# Patient Record
Sex: FEMALE | Race: WHITE | NOT HISPANIC OR LATINO | Employment: FULL TIME | URBAN - NONMETROPOLITAN AREA
[De-identification: names, ages, dates, MRNs, and addresses within clinical notes are randomized per-mention and may not be internally consistent; named-entity substitution may affect disease eponyms.]

---

## 2021-01-12 ENCOUNTER — OFFICE VISIT (OUTPATIENT)
Dept: URGENT CARE | Facility: CLINIC | Age: 36
End: 2021-01-12
Payer: COMMERCIAL

## 2021-01-12 VITALS
DIASTOLIC BLOOD PRESSURE: 82 MMHG | SYSTOLIC BLOOD PRESSURE: 132 MMHG | OXYGEN SATURATION: 94 % | RESPIRATION RATE: 14 BRPM | HEIGHT: 65 IN | TEMPERATURE: 98.5 F | WEIGHT: 237 LBS | BODY MASS INDEX: 39.49 KG/M2 | HEART RATE: 67 BPM

## 2021-01-12 DIAGNOSIS — M79.644 FINGER PAIN, RIGHT: ICD-10-CM

## 2021-01-12 PROCEDURE — 99203 OFFICE O/P NEW LOW 30 MIN: CPT | Performed by: PHYSICIAN ASSISTANT

## 2021-01-12 RX ORDER — DEXAMETHASONE SODIUM PHOSPHATE 4 MG/ML
4 INJECTION, SOLUTION INTRA-ARTICULAR; INTRALESIONAL; INTRAMUSCULAR; INTRAVENOUS; SOFT TISSUE ONCE
Status: DISCONTINUED | OUTPATIENT
Start: 2021-01-12 | End: 2021-01-12

## 2021-01-12 RX ORDER — BUPROPION HYDROCHLORIDE 300 MG/1
TABLET ORAL
COMMUNITY
Start: 2021-01-05 | End: 2021-08-25

## 2021-01-12 RX ORDER — DEXAMETHASONE SODIUM PHOSPHATE 10 MG/ML
4 INJECTION INTRAMUSCULAR; INTRAVENOUS ONCE
OUTPATIENT
Start: 2021-01-12 | End: 2021-01-13

## 2021-01-12 SDOH — HEALTH STABILITY: MENTAL HEALTH: HOW OFTEN DO YOU HAVE A DRINK CONTAINING ALCOHOL?: NEVER

## 2021-01-12 ASSESSMENT — ENCOUNTER SYMPTOMS
SENSORY CHANGE: 0
FOCAL WEAKNESS: 0
GASTROINTESTINAL NEGATIVE: 1
RESPIRATORY NEGATIVE: 1
EYES NEGATIVE: 1
TINGLING: 0
CARDIOVASCULAR NEGATIVE: 1
CONSTITUTIONAL NEGATIVE: 1

## 2021-01-12 NOTE — LETTER
January 12, 2021         Patient: Katherine Salazar   YOB: 1985   Date of Visit: 1/12/2021           To Whom it May Concern:    Katherine Salazar was seen in my clinic on 1/12/2021. Please excuse from work today and tomorrow.     If you have any questions or concerns, please don't hesitate to call.        Sincerely,           Daljit Carrion P.A.-C.  Electronically Signed

## 2021-01-12 NOTE — PROGRESS NOTES
Subjective:   Katherine Salazar is a 35 y.o. female who presents for Hand Pain (right hand)      HPI  Patient is a 35-year-old female who reports of right finger pain and mild swelling onset 01/08/2021.  She states she recently received her Covid vaccination on 01/08/2021 to her left deltoid.  Later on that day, she noticed mild pain and swelling to her right finger with mild minimal swelling to her hand.  There is no redness.  She denies any other side effects possibly from the vaccine other than some left shoulder tenderness and minimal swelling.  She has tried ibuprofen and ice application with minimal relief.  She denies any fever, chills, rash, itching, numbness, tingling, weakness, chest pain, shortness of breath, swelling of lips, swelling of tongue, globus sensation of throat, or any other concerns.  Denies history of gout.  Denies a history of rheumatoid arthritis.  She is unsure of any injury, however she admits she does sleep walk and occasionally she injures herself from sleepwalking such as falls.     Review of Systems   Constitutional: Negative.    HENT: Negative.    Eyes: Negative.    Respiratory: Negative.    Cardiovascular: Negative.    Gastrointestinal: Negative.    Musculoskeletal:        Right finger pain and swelling  Left deltoid tenderness and minimal swelling s/p Covid vaccination injection site.   Skin: Negative.  Negative for itching and rash.   Neurological: Negative for tingling, sensory change and focal weakness.       Medications:    • buPROPion    Allergies: Patient has no known allergies.    Problem List: Katherine Salazar does not have a problem list on file.    Surgical History:  No past surgical history on file.    Past Social Hx: Katherine Salazar  reports that she has never smoked. She has never used smokeless tobacco. She reports that she does not drink alcohol or use drugs.     Past Family Hx:  Katherine Salazar family history is not on file.     Problem list, medications,  "and allergies reviewed by myself today in Epic.     Objective:     /82 (BP Location: Left arm, Patient Position: Sitting)   Pulse 67   Temp 36.9 °C (98.5 °F)   Resp 14   Ht 1.651 m (5' 5\")   Wt 107.5 kg (237 lb)   SpO2 94%   BMI 39.44 kg/m²     Physical Exam  Vitals signs reviewed.   Constitutional:       General: She is not in acute distress.     Appearance: Normal appearance. She is not ill-appearing or toxic-appearing.   HENT:      Head: Normocephalic.      Right Ear: External ear normal.      Left Ear: External ear normal.      Nose: Nose normal.      Mouth/Throat:      Mouth: Mucous membranes are moist. No angioedema.      Dentition: No gum lesions.      Tongue: No lesions.      Palate: No lesions.      Pharynx: Uvula midline. No pharyngeal swelling, oropharyngeal exudate, posterior oropharyngeal erythema or uvula swelling.      Tonsils: No tonsillar exudate or tonsillar abscesses.   Eyes:      Conjunctiva/sclera: Conjunctivae normal.      Pupils: Pupils are equal, round, and reactive to light.   Neck:      Musculoskeletal: Neck supple.   Cardiovascular:      Rate and Rhythm: Normal rate and regular rhythm.      Heart sounds: Normal heart sounds.   Pulmonary:      Effort: Pulmonary effort is normal. No respiratory distress.      Breath sounds: Normal breath sounds. No wheezing, rhonchi or rales.   Musculoskeletal:        Hands:       Comments: Left deltoid: Normal appearance without any erythema, edema, crepitus, deformity, rash.   Positive tenderness to palpation s/p Covid vaccination injection    Right hand: Full range of motion.   Strength 5/5 and equal    Minimal edema to proximal index finger with associated very minimal faint ecchymosis.   Mild tenderness to palpation to proximal index finger and PIP joint.  Negative erythema, crepitus, deformity.  Sensation intact  Brisk capillary refill  Pulses intact   Lymphadenopathy:      Cervical: No cervical adenopathy.   Skin:     General: Skin is " warm and dry.   Neurological:      General: No focal deficit present.      Mental Status: She is alert and oriented to person, place, and time.   Psychiatric:         Mood and Affect: Mood normal.         Behavior: Behavior normal.         Assessment/Associated Orders     1. Finger pain, right  dexamethasone (DECADRON) injection (check route below) 4 mg    DISCONTINUED: dexamethasone (DECADRON) injection 4 mg       Medical Decision Making      Discussed with patient to my knowledge, her right finger pain is unrelated to her COVID-19 vaccination.  Patient signs and symptoms and history are not consistent with an allergic reaction.  Discussed her signs and symptoms for right finger pain and minimal swelling appear to be a musculoskeletal injury possibly she obtained from sleepwalking.  She states she occasionally gets injury from sleepwalking. Discussed initially obtain x-ray, however no x-ray here in the urgent care.  Finger splinted.  Recommended rest, ice application, elevation, start ibuprofen per  instructions tomorrow.  1 dose of dexamethasone oral here in the clinic to help with possible inflammation and swelling.   Follow-up with PCP or return to the urgent care if no improvement in the next several days or any worsening or any other new signs of a rash, worsening swelling, or any other concerns.  Patient requesting a Work note. Note provided.     My total time spent caring for the patient on the day of the encounter was 30 minutes.     Discussed management options, risks and benefits, and alternatives to treatment plan agreed upon.   Red flags discussed and indications to immediately call 911 or present to the Emergency Department.   Supportive care, differential diagnoses, and indications for immediate follow-up discussed with patient.    Patient expresses understanding and agrees to plan. Patient denies any other questions or concerns.     Advised the patient to follow-up with the primary care  physician for recheck, reevaluation, and consideration of further management.    Please note that this dictation was created using voice recognition software. I have made a reasonable attempt to correct obvious errors, but I expect that there are errors of grammar and possibly content that I did not discover before finalizing the note.    This note was electronically signed by Daljit Carrion PA-C

## 2021-05-30 ENCOUNTER — HOSPITAL ENCOUNTER (EMERGENCY)
Facility: MEDICAL CENTER | Age: 36
End: 2021-05-30
Attending: EMERGENCY MEDICINE
Payer: OTHER GOVERNMENT

## 2021-05-30 ENCOUNTER — APPOINTMENT (OUTPATIENT)
Dept: RADIOLOGY | Facility: MEDICAL CENTER | Age: 36
End: 2021-05-30
Attending: EMERGENCY MEDICINE
Payer: OTHER GOVERNMENT

## 2021-05-30 VITALS
HEIGHT: 65 IN | OXYGEN SATURATION: 100 % | HEART RATE: 80 BPM | SYSTOLIC BLOOD PRESSURE: 131 MMHG | DIASTOLIC BLOOD PRESSURE: 74 MMHG | BODY MASS INDEX: 43.34 KG/M2 | RESPIRATION RATE: 16 BRPM | WEIGHT: 260.14 LBS | TEMPERATURE: 98.7 F

## 2021-05-30 DIAGNOSIS — K59.00 CONSTIPATION, UNSPECIFIED CONSTIPATION TYPE: ICD-10-CM

## 2021-05-30 DIAGNOSIS — I88.0 MESENTERIC ADENITIS: ICD-10-CM

## 2021-05-30 LAB
ALBUMIN SERPL BCP-MCNC: 4.3 G/DL (ref 3.2–4.9)
ALBUMIN/GLOB SERPL: 1.4 G/DL
ALP SERPL-CCNC: 78 U/L (ref 30–99)
ALT SERPL-CCNC: 15 U/L (ref 2–50)
ANION GAP SERPL CALC-SCNC: 12 MMOL/L (ref 7–16)
APPEARANCE UR: CLEAR
AST SERPL-CCNC: 14 U/L (ref 12–45)
BASOPHILS # BLD AUTO: 0.5 % (ref 0–1.8)
BASOPHILS # BLD: 0.05 K/UL (ref 0–0.12)
BILIRUB SERPL-MCNC: <0.2 MG/DL (ref 0.1–1.5)
BILIRUB UR QL STRIP.AUTO: NEGATIVE
BUN SERPL-MCNC: 14 MG/DL (ref 8–22)
CALCIUM SERPL-MCNC: 9 MG/DL (ref 8.4–10.2)
CHLORIDE SERPL-SCNC: 104 MMOL/L (ref 96–112)
CO2 SERPL-SCNC: 23 MMOL/L (ref 20–33)
COLOR UR: YELLOW
CREAT SERPL-MCNC: 0.88 MG/DL (ref 0.5–1.4)
EOSINOPHIL # BLD AUTO: 0.26 K/UL (ref 0–0.51)
EOSINOPHIL NFR BLD: 2.7 % (ref 0–6.9)
ERYTHROCYTE [DISTWIDTH] IN BLOOD BY AUTOMATED COUNT: 40 FL (ref 35.9–50)
GLOBULIN SER CALC-MCNC: 3 G/DL (ref 1.9–3.5)
GLUCOSE SERPL-MCNC: 110 MG/DL (ref 65–99)
GLUCOSE UR STRIP.AUTO-MCNC: NEGATIVE MG/DL
HCG SERPL QL: NEGATIVE
HCT VFR BLD AUTO: 43.8 % (ref 37–47)
HGB BLD-MCNC: 14.7 G/DL (ref 12–16)
IMM GRANULOCYTES # BLD AUTO: 0.04 K/UL (ref 0–0.11)
IMM GRANULOCYTES NFR BLD AUTO: 0.4 % (ref 0–0.9)
KETONES UR STRIP.AUTO-MCNC: NEGATIVE MG/DL
LEUKOCYTE ESTERASE UR QL STRIP.AUTO: NEGATIVE
LIPASE SERPL-CCNC: 47 U/L (ref 7–58)
LYMPHOCYTES # BLD AUTO: 2.28 K/UL (ref 1–4.8)
LYMPHOCYTES NFR BLD: 23.7 % (ref 22–41)
MCH RBC QN AUTO: 29.7 PG (ref 27–33)
MCHC RBC AUTO-ENTMCNC: 33.6 G/DL (ref 33.6–35)
MCV RBC AUTO: 88.5 FL (ref 81.4–97.8)
MICRO URNS: NORMAL
MONOCYTES # BLD AUTO: 0.57 K/UL (ref 0–0.85)
MONOCYTES NFR BLD AUTO: 5.9 % (ref 0–13.4)
NEUTROPHILS # BLD AUTO: 6.41 K/UL (ref 2–7.15)
NEUTROPHILS NFR BLD: 66.8 % (ref 44–72)
NITRITE UR QL STRIP.AUTO: NEGATIVE
NRBC # BLD AUTO: 0 K/UL
NRBC BLD-RTO: 0 /100 WBC
PH UR STRIP.AUTO: 5.5 [PH] (ref 5–8)
PLATELET # BLD AUTO: 422 K/UL (ref 164–446)
PMV BLD AUTO: 9.1 FL (ref 9–12.9)
POTASSIUM SERPL-SCNC: 4.3 MMOL/L (ref 3.6–5.5)
PROT SERPL-MCNC: 7.3 G/DL (ref 6–8.2)
PROT UR QL STRIP: NEGATIVE MG/DL
RBC # BLD AUTO: 4.95 M/UL (ref 4.2–5.4)
RBC UR QL AUTO: NEGATIVE
SODIUM SERPL-SCNC: 139 MMOL/L (ref 135–145)
SP GR UR STRIP.AUTO: 1.02
WBC # BLD AUTO: 9.6 K/UL (ref 4.8–10.8)

## 2021-05-30 PROCEDURE — 99285 EMERGENCY DEPT VISIT HI MDM: CPT

## 2021-05-30 PROCEDURE — 700102 HCHG RX REV CODE 250 W/ 637 OVERRIDE(OP): Performed by: EMERGENCY MEDICINE

## 2021-05-30 PROCEDURE — 96374 THER/PROPH/DIAG INJ IV PUSH: CPT

## 2021-05-30 PROCEDURE — 74176 CT ABD & PELVIS W/O CONTRAST: CPT

## 2021-05-30 PROCEDURE — 700111 HCHG RX REV CODE 636 W/ 250 OVERRIDE (IP): Performed by: EMERGENCY MEDICINE

## 2021-05-30 PROCEDURE — A9270 NON-COVERED ITEM OR SERVICE: HCPCS | Performed by: EMERGENCY MEDICINE

## 2021-05-30 PROCEDURE — 96375 TX/PRO/DX INJ NEW DRUG ADDON: CPT

## 2021-05-30 PROCEDURE — 84703 CHORIONIC GONADOTROPIN ASSAY: CPT

## 2021-05-30 PROCEDURE — 80053 COMPREHEN METABOLIC PANEL: CPT

## 2021-05-30 PROCEDURE — 85025 COMPLETE CBC W/AUTO DIFF WBC: CPT

## 2021-05-30 PROCEDURE — 83690 ASSAY OF LIPASE: CPT

## 2021-05-30 PROCEDURE — 81003 URINALYSIS AUTO W/O SCOPE: CPT

## 2021-05-30 RX ORDER — AMOXICILLIN AND CLAVULANATE POTASSIUM 875; 125 MG/1; MG/1
1 TABLET, FILM COATED ORAL 2 TIMES DAILY
Qty: 14 TABLET | Refills: 0 | Status: SHIPPED | OUTPATIENT
Start: 2021-05-30 | End: 2021-06-06

## 2021-05-30 RX ORDER — OXYCODONE AND ACETAMINOPHEN 10; 325 MG/1; MG/1
1 TABLET ORAL ONCE
Status: COMPLETED | OUTPATIENT
Start: 2021-05-30 | End: 2021-05-30

## 2021-05-30 RX ORDER — OXYCODONE HYDROCHLORIDE AND ACETAMINOPHEN 5; 325 MG/1; MG/1
1 TABLET ORAL EVERY 8 HOURS PRN
Qty: 12 TABLET | Refills: 0 | Status: SHIPPED | OUTPATIENT
Start: 2021-05-30 | End: 2021-06-03

## 2021-05-30 RX ORDER — AMOXICILLIN AND CLAVULANATE POTASSIUM 875; 125 MG/1; MG/1
1 TABLET, FILM COATED ORAL ONCE
Status: COMPLETED | OUTPATIENT
Start: 2021-05-30 | End: 2021-05-30

## 2021-05-30 RX ORDER — ONDANSETRON 2 MG/ML
4 INJECTION INTRAMUSCULAR; INTRAVENOUS ONCE
Status: COMPLETED | OUTPATIENT
Start: 2021-05-30 | End: 2021-05-30

## 2021-05-30 RX ORDER — KETOROLAC TROMETHAMINE 30 MG/ML
30 INJECTION, SOLUTION INTRAMUSCULAR; INTRAVENOUS ONCE
Status: COMPLETED | OUTPATIENT
Start: 2021-05-30 | End: 2021-05-30

## 2021-05-30 RX ORDER — MORPHINE SULFATE 4 MG/ML
4 INJECTION, SOLUTION INTRAMUSCULAR; INTRAVENOUS ONCE
Status: COMPLETED | OUTPATIENT
Start: 2021-05-30 | End: 2021-05-30

## 2021-05-30 RX ADMIN — ONDANSETRON 4 MG: 2 INJECTION INTRAMUSCULAR; INTRAVENOUS at 06:23

## 2021-05-30 RX ADMIN — OXYCODONE HYDROCHLORIDE AND ACETAMINOPHEN 1 TABLET: 10; 325 TABLET ORAL at 07:25

## 2021-05-30 RX ADMIN — AMOXICILLIN AND CLAVULANATE POTASSIUM 1 TABLET: 875; 125 TABLET, FILM COATED ORAL at 07:27

## 2021-05-30 RX ADMIN — MORPHINE SULFATE 4 MG: 4 INJECTION INTRAVENOUS at 06:23

## 2021-05-30 RX ADMIN — KETOROLAC TROMETHAMINE 30 MG: 30 INJECTION, SOLUTION INTRAMUSCULAR at 06:23

## 2021-05-30 ASSESSMENT — PAIN DESCRIPTION - PAIN TYPE: TYPE: ACUTE PAIN

## 2021-05-30 NOTE — DISCHARGE INSTRUCTIONS
The CT today shows that you still have mesenteric adenitis.  I also think some mild constipation is contributing to your pain.  Take the medications as directed.  The pain medication may make you more constipated.  Make sure you drink lots of water and have a high-fiber diet.  He also might want to take Colace with your Percocet.  If you develop a fever, have worsening pain, vomiting return immediately.  Otherwise see your regular doctor as scheduled on Wednesday.  I really hope you feel better soon.

## 2021-05-30 NOTE — ED TRIAGE NOTES
"Chief Complaint   Patient presents with   • RLQ Pain     intermittent for 2 weeks, acutely worsened 2 hours ago.     ED Triage Vitals [05/30/21 0521]   Enc Vitals Group      Blood Pressure 148/105      Pulse 99      Respiration 19      Temperature 37.1 °C (98.7 °F)      Temp src Temporal      Pulse Oximetry 95 %      Weight 118 kg (260 lb 2.3 oz)      Height 1.651 m (5' 5\")     "

## 2021-05-30 NOTE — ED PROVIDER NOTES
ED Provider Note    CHIEF COMPLAINT  Chief Complaint   Patient presents with   • RLQ Pain     intermittent for 2 weeks, acutely worsened 2 hours ago.       HPI  Katherine Salazar is a 35 y.o. female who presents with right lower quadrant pain.  Initially started about a month ago.  However got much worse about 2 weeks ago.  She has been evaluated at [facility] where they did not find any obvious cause for her pain.  This evening it awoke her from sleep and was much worse than before.  It is a sharp stabbing pain.  She denies any dysuria or hematuria.  She states the pain radiates into her groin.  She denies any vaginal bleeding or pregnancy.  No fevers.  No nausea vomiting or diarrhea.  No vaginal discharge.  She has a history of cholecystectomy.  Her primary care doctor tried Augmentin to see if that would help with her pain but it did not.  Nothing seems to make it better or worse.    REVIEW OF SYSTEMS  See HPI for further details. All other systems are negative.     PAST MEDICAL HISTORY  Past Medical History:   Diagnosis Date   • Previous  section- desires Repeat 2010   • SUPERVISION OF HIGH-RISK PREGNANCY 2010   • History of  2010   • History of macrosomia in infant in prior pregnancy, currently pregnant 2010   • FHx: cleft palate 2010   • ASTHMA     medications not needed   • Hypoglycemia     at 16 years old       FAMILY HISTORY  [unfilled]    SOCIAL HISTORY  Social History     Socioeconomic History   • Marital status:      Spouse name: Not on file   • Number of children: Not on file   • Years of education: Not on file   • Highest education level: Not on file   Occupational History   • Not on file   Tobacco Use   • Smoking status: Never Smoker   • Smokeless tobacco: Never Used   Vaping Use   • Vaping Use: Never used   Substance and Sexual Activity   • Alcohol use: Never   • Drug use: Never     Comment: marijuana   • Sexual activity: Yes     Partners: Male  "  Other Topics Concern   • Not on file   Social History Narrative    ** Merged History Encounter **          Social Determinants of Health     Financial Resource Strain:    • Difficulty of Paying Living Expenses:    Food Insecurity:    • Worried About Running Out of Food in the Last Year:    • Ran Out of Food in the Last Year:    Transportation Needs:    • Lack of Transportation (Medical):    • Lack of Transportation (Non-Medical):    Physical Activity:    • Days of Exercise per Week:    • Minutes of Exercise per Session:    Stress:    • Feeling of Stress :    Social Connections:    • Frequency of Communication with Friends and Family:    • Frequency of Social Gatherings with Friends and Family:    • Attends Hinduism Services:    • Active Member of Clubs or Organizations:    • Attends Club or Organization Meetings:    • Marital Status:    Intimate Partner Violence:    • Fear of Current or Ex-Partner:    • Emotionally Abused:    • Physically Abused:    • Sexually Abused:        SURGICAL HISTORY  Past Surgical History:   Procedure Laterality Date   • REPEAT C SECTION W TUBAL LIGATION  9/9/2010    Performed by JF DOHERTY at [facility] DEL[facility]   • PRIMARY C SECTION  2008   • TONSILLECTOMY  at age 5       CURRENT MEDICATIONS   Home Medications     Reviewed by Azeem Genao R.N. (Registered Nurse) on 05/30/21 at 0542  Med List Status: <None>   Medication Last Dose Status   buPROPion (WELLBUTRIN XL) 300 MG XL tablet  Active                ALLERGIES  No Known Allergies    PHYSICAL EXAM  VITAL SIGNS: /105   Pulse 99   Temp 37.1 °C (98.7 °F) (Temporal)   Resp 19   Ht 1.651 m (5' 5\")   Wt 118 kg (260 lb 2.3 oz)   SpO2 95%   BMI 43.29 kg/m²       Constitutional: Well developed, moderate acute distress crying, Non-toxic appearance.   HENT: Normocephalic, Atraumatic, Bilateral external ears normal,  Nose normal.   Eyes: PERRL, EOMI, Conjunctiva normal  Neck: Normal range of motion, No tenderness, " Supple  Cardiovascular: Normal heart rate, Normal rhythm   Thorax & Lungs: Normal breath sounds, No respiratory distress,   Abdomen: Right lower quadrant tenderness to palpation, no rebound or guarding, no distention, no pulsatile mass  Skin: Warm, Dry, No erythema, No rash.   Back: No tenderness, No CVA tenderness.   Extremities: Intact distal pulses, No edema, No tenderness   Neurologic: Alert & oriented x 3, Normal motor function, Normal sensory function, No focal deficits noted.   Psychiatric: appropriate    Labs  Results for orders placed or performed during the hospital encounter of 05/30/21   CBC WITH DIFFERENTIAL   Result Value Ref Range    WBC 9.6 4.8 - 10.8 K/uL    RBC 4.95 4.20 - 5.40 M/uL    Hemoglobin 14.7 12.0 - 16.0 g/dL    Hematocrit 43.8 37.0 - 47.0 %    MCV 88.5 81.4 - 97.8 fL    MCH 29.7 27.0 - 33.0 pg    MCHC 33.6 33.6 - 35.0 g/dL    RDW 40.0 35.9 - 50.0 fL    Platelet Count 422 164 - 446 K/uL    MPV 9.1 9.0 - 12.9 fL    Neutrophils-Polys 66.80 44.00 - 72.00 %    Lymphocytes 23.70 22.00 - 41.00 %    Monocytes 5.90 0.00 - 13.40 %    Eosinophils 2.70 0.00 - 6.90 %    Basophils 0.50 0.00 - 1.80 %    Immature Granulocytes 0.40 0.00 - 0.90 %    Nucleated RBC 0.00 /100 WBC    Neutrophils (Absolute) 6.41 2.00 - 7.15 K/uL    Lymphs (Absolute) 2.28 1.00 - 4.80 K/uL    Monos (Absolute) 0.57 0.00 - 0.85 K/uL    Eos (Absolute) 0.26 0.00 - 0.51 K/uL    Baso (Absolute) 0.05 0.00 - 0.12 K/uL    Immature Granulocytes (abs) 0.04 0.00 - 0.11 K/uL    NRBC (Absolute) 0.00 K/uL   COMP METABOLIC PANEL   Result Value Ref Range    Sodium 139 135 - 145 mmol/L    Potassium 4.3 3.6 - 5.5 mmol/L    Chloride 104 96 - 112 mmol/L    Co2 23 20 - 33 mmol/L    Anion Gap 12.0 7.0 - 16.0    Glucose 110 (H) 65 - 99 mg/dL    Bun 14 8 - 22 mg/dL    Creatinine 0.88 0.50 - 1.40 mg/dL    Calcium 9.0 8.4 - 10.2 mg/dL    AST(SGOT) 14 12 - 45 U/L    ALT(SGPT) 15 2 - 50 U/L    Alkaline Phosphatase 78 30 - 99 U/L    Total Bilirubin <0.2 0.1 -  1.5 mg/dL    Albumin 4.3 3.2 - 4.9 g/dL    Total Protein 7.3 6.0 - 8.2 g/dL    Globulin 3.0 1.9 - 3.5 g/dL    A-G Ratio 1.4 g/dL   LIPASE   Result Value Ref Range    Lipase 47 7 - 58 U/L   URINALYSIS,CULTURE IF INDICATED    Specimen: Urine   Result Value Ref Range    Color Yellow     Character Clear     Specific Gravity 1.025 <1.035    Ph 5.5 5.0 - 8.0    Glucose Negative Negative mg/dL    Ketones Negative Negative mg/dL    Protein Negative Negative mg/dL    Bilirubin Negative Negative    Nitrite Negative Negative    Leukocyte Esterase Negative Negative    Occult Blood Negative Negative    Micro Urine Req see below    HCG QUAL SERUM   Result Value Ref Range    Beta-Hcg Qualitative Serum Negative Negative   ESTIMATED GFR   Result Value Ref Range    GFR If African American >60 >60 mL/min/1.73 m 2    GFR If Non African American >60 >60 mL/min/1.73 m 2       RADIOLOGY/PROCEDURES  CT-RENAL COLIC EVALUATION(A/P W/O)   Final Result      No CT evidence of urolithiasis or hydronephrosis      Mildly increased number of ileocolic lymph nodes could indicate mesenteric adenitis. No enlarged nodes are seen to suggest malignancy      Mildly above-average stool volume      Cholecystectomy          COURSE & MEDICAL DECISION MAKING  Pertinent Labs & Imaging studies reviewed. (See chart for details)  Patient presents emergency department with right lower quadrant pain.  Ongoing for several weeks.  Worsening last night.  Described as sharp and stabbing.  Patient is afebrile here.  She has no peritoneal signs.  Differential includes possible kidney stone versus ovarian cyst or torsion.  Doubt appendicitis as she is not had any fevers vomiting or diarrhea.  Will obtain a CT scan to further evaluate this.  CT scan is negative we will proceed with ultrasound.    Review of patient's chart from [facility] showed that the CT at that time showed mesenteric adenitis.  Patient had also seen her gynecologist who had done an ultrasound that  showed fibroids.    CT returns today and shows ileocecal lymph nodes that could indicate mesenteric adenitis.  This is consistent with previous CT.  I do suspect this is contributing to her pain.  She also has constipation.  She stated she did get better initially on the Augmentin and then her symptoms returned after she finished the course.  Therefore I do think it is reasonable to try another course of Augmentin.  I also feel she needs to follow-up with GI to further evaluate these lymph nodes.  Seems a bit unusual that it is still hurting 2 weeks later.  No history of a significant ovarian cyst and she has had an ultrasound at her gynecologist office.  Therefore I think this is unlikely a torsion.  There is no cyst seen on CT today.  Abdominal pain return precautions were given.    In prescribing controlled substances to this patient, I certify that I have obtained and reviewed the medical history of Katherine Salazar. I have also made a good ajit effort to obtain applicable records from other providers who have treated the patient and no other records are available at this time.     I have conducted a physical exam and documented it. I have reviewed Ms. Salazar’s prescription history as maintained by the [place] Prescription Monitoring Program.     I have assessed the patient’s risk for abuse, dependency, and addiction using the validated Opioid Risk Tool available at https://www.mdcalc.com/iauzal-tzwu-ibja-ort-narcotic-abuse.     Given the above, I believe the benefits of controlled substance therapy outweigh the risks. The reasons for prescribing controlled substances include non-narcotic, oral analgesic alternatives have been inadequate for pain control. Accordingly, I have discussed the risk and benefits, treatment plan, and alternative therapies with the patient.     Patient is discharged in stable condition.    FINAL IMPRESSION     1. Mesenteric adenitis    2. Constipation, unspecified constipation type              Electronically signed by: Daly James M.D., 5/30/2021 6:20 AM

## 2021-06-11 ENCOUNTER — HOSPITAL ENCOUNTER (EMERGENCY)
Facility: MEDICAL CENTER | Age: 36
End: 2021-06-11
Attending: EMERGENCY MEDICINE
Payer: OTHER GOVERNMENT

## 2021-06-11 ENCOUNTER — APPOINTMENT (OUTPATIENT)
Dept: RADIOLOGY | Facility: MEDICAL CENTER | Age: 36
End: 2021-06-11
Attending: EMERGENCY MEDICINE
Payer: OTHER GOVERNMENT

## 2021-06-11 VITALS
TEMPERATURE: 97 F | HEART RATE: 89 BPM | OXYGEN SATURATION: 95 % | BODY MASS INDEX: 42.61 KG/M2 | SYSTOLIC BLOOD PRESSURE: 152 MMHG | HEIGHT: 65 IN | RESPIRATION RATE: 18 BRPM | DIASTOLIC BLOOD PRESSURE: 76 MMHG | WEIGHT: 255.73 LBS

## 2021-06-11 DIAGNOSIS — D25.9 UTERINE LEIOMYOMA, UNSPECIFIED LOCATION: ICD-10-CM

## 2021-06-11 DIAGNOSIS — R10.30 LOWER ABDOMINAL PAIN: ICD-10-CM

## 2021-06-11 LAB
ALBUMIN SERPL BCP-MCNC: 4 G/DL (ref 3.2–4.9)
ALBUMIN/GLOB SERPL: 1.4 G/DL
ALP SERPL-CCNC: 74 U/L (ref 30–99)
ALT SERPL-CCNC: 18 U/L (ref 2–50)
ANION GAP SERPL CALC-SCNC: 12 MMOL/L (ref 7–16)
APPEARANCE UR: CLEAR
AST SERPL-CCNC: 13 U/L (ref 12–45)
BASOPHILS # BLD AUTO: 0.5 % (ref 0–1.8)
BASOPHILS # BLD: 0.05 K/UL (ref 0–0.12)
BILIRUB SERPL-MCNC: 0.3 MG/DL (ref 0.1–1.5)
BILIRUB UR QL STRIP.AUTO: NEGATIVE
BUN SERPL-MCNC: 9 MG/DL (ref 8–22)
CALCIUM SERPL-MCNC: 9.1 MG/DL (ref 8.4–10.2)
CHLORIDE SERPL-SCNC: 104 MMOL/L (ref 96–112)
CO2 SERPL-SCNC: 22 MMOL/L (ref 20–33)
COLOR UR: YELLOW
CREAT SERPL-MCNC: 0.61 MG/DL (ref 0.5–1.4)
EOSINOPHIL # BLD AUTO: 0.19 K/UL (ref 0–0.51)
EOSINOPHIL NFR BLD: 1.9 % (ref 0–6.9)
ERYTHROCYTE [DISTWIDTH] IN BLOOD BY AUTOMATED COUNT: 38.4 FL (ref 35.9–50)
GLOBULIN SER CALC-MCNC: 2.8 G/DL (ref 1.9–3.5)
GLUCOSE SERPL-MCNC: 135 MG/DL (ref 65–99)
GLUCOSE UR STRIP.AUTO-MCNC: NEGATIVE MG/DL
HCG SERPL QL: NEGATIVE
HCT VFR BLD AUTO: 40.9 % (ref 37–47)
HGB BLD-MCNC: 14.2 G/DL (ref 12–16)
IMM GRANULOCYTES # BLD AUTO: 0.03 K/UL (ref 0–0.11)
IMM GRANULOCYTES NFR BLD AUTO: 0.3 % (ref 0–0.9)
KETONES UR STRIP.AUTO-MCNC: NEGATIVE MG/DL
LEUKOCYTE ESTERASE UR QL STRIP.AUTO: NEGATIVE
LIPASE SERPL-CCNC: 34 U/L (ref 7–58)
LYMPHOCYTES # BLD AUTO: 2.35 K/UL (ref 1–4.8)
LYMPHOCYTES NFR BLD: 23 % (ref 22–41)
MCH RBC QN AUTO: 30 PG (ref 27–33)
MCHC RBC AUTO-ENTMCNC: 34.7 G/DL (ref 33.6–35)
MCV RBC AUTO: 86.3 FL (ref 81.4–97.8)
MICRO URNS: NORMAL
MONOCYTES # BLD AUTO: 0.63 K/UL (ref 0–0.85)
MONOCYTES NFR BLD AUTO: 6.2 % (ref 0–13.4)
NEUTROPHILS # BLD AUTO: 6.96 K/UL (ref 2–7.15)
NEUTROPHILS NFR BLD: 68.1 % (ref 44–72)
NITRITE UR QL STRIP.AUTO: NEGATIVE
NRBC # BLD AUTO: 0 K/UL
NRBC BLD-RTO: 0 /100 WBC
PH UR STRIP.AUTO: 5 [PH] (ref 5–8)
PLATELET # BLD AUTO: 392 K/UL (ref 164–446)
PMV BLD AUTO: 8.7 FL (ref 9–12.9)
POTASSIUM SERPL-SCNC: 4.3 MMOL/L (ref 3.6–5.5)
PROT SERPL-MCNC: 6.8 G/DL (ref 6–8.2)
PROT UR QL STRIP: NEGATIVE MG/DL
RBC # BLD AUTO: 4.74 M/UL (ref 4.2–5.4)
RBC UR QL AUTO: NEGATIVE
SODIUM SERPL-SCNC: 138 MMOL/L (ref 135–145)
SP GR UR STRIP.AUTO: >=1.03
WBC # BLD AUTO: 10.2 K/UL (ref 4.8–10.8)

## 2021-06-11 PROCEDURE — 74177 CT ABD & PELVIS W/CONTRAST: CPT

## 2021-06-11 PROCEDURE — 96374 THER/PROPH/DIAG INJ IV PUSH: CPT | Mod: XU

## 2021-06-11 PROCEDURE — 700105 HCHG RX REV CODE 258: Performed by: EMERGENCY MEDICINE

## 2021-06-11 PROCEDURE — 76856 US EXAM PELVIC COMPLETE: CPT

## 2021-06-11 PROCEDURE — 85025 COMPLETE CBC W/AUTO DIFF WBC: CPT

## 2021-06-11 PROCEDURE — 84703 CHORIONIC GONADOTROPIN ASSAY: CPT

## 2021-06-11 PROCEDURE — 83690 ASSAY OF LIPASE: CPT

## 2021-06-11 PROCEDURE — 700117 HCHG RX CONTRAST REV CODE 255: Performed by: EMERGENCY MEDICINE

## 2021-06-11 PROCEDURE — 700111 HCHG RX REV CODE 636 W/ 250 OVERRIDE (IP): Performed by: EMERGENCY MEDICINE

## 2021-06-11 PROCEDURE — 96375 TX/PRO/DX INJ NEW DRUG ADDON: CPT

## 2021-06-11 PROCEDURE — 99285 EMERGENCY DEPT VISIT HI MDM: CPT

## 2021-06-11 PROCEDURE — 96376 TX/PRO/DX INJ SAME DRUG ADON: CPT

## 2021-06-11 PROCEDURE — 81003 URINALYSIS AUTO W/O SCOPE: CPT

## 2021-06-11 PROCEDURE — 80053 COMPREHEN METABOLIC PANEL: CPT

## 2021-06-11 RX ORDER — MORPHINE SULFATE 4 MG/ML
4 INJECTION, SOLUTION INTRAMUSCULAR; INTRAVENOUS ONCE
Status: COMPLETED | OUTPATIENT
Start: 2021-06-11 | End: 2021-06-11

## 2021-06-11 RX ORDER — ONDANSETRON 2 MG/ML
4 INJECTION INTRAMUSCULAR; INTRAVENOUS ONCE
Status: COMPLETED | OUTPATIENT
Start: 2021-06-11 | End: 2021-06-11

## 2021-06-11 RX ORDER — TRAMADOL HYDROCHLORIDE 50 MG/1
50 TABLET ORAL EVERY 6 HOURS PRN
Qty: 25 TABLET | Refills: 0 | Status: SHIPPED | OUTPATIENT
Start: 2021-06-11 | End: 2021-06-11 | Stop reason: SDUPTHER

## 2021-06-11 RX ORDER — TRAMADOL HYDROCHLORIDE 50 MG/1
50 TABLET ORAL EVERY 6 HOURS PRN
Qty: 25 TABLET | Refills: 0 | Status: SHIPPED | OUTPATIENT
Start: 2021-06-11 | End: 2021-06-18

## 2021-06-11 RX ORDER — SODIUM CHLORIDE, SODIUM LACTATE, POTASSIUM CHLORIDE, CALCIUM CHLORIDE 600; 310; 30; 20 MG/100ML; MG/100ML; MG/100ML; MG/100ML
1000 INJECTION, SOLUTION INTRAVENOUS ONCE
Status: COMPLETED | OUTPATIENT
Start: 2021-06-11 | End: 2021-06-11

## 2021-06-11 RX ADMIN — SODIUM CHLORIDE, POTASSIUM CHLORIDE, SODIUM LACTATE AND CALCIUM CHLORIDE 1000 ML: 600; 310; 30; 20 INJECTION, SOLUTION INTRAVENOUS at 16:57

## 2021-06-11 RX ADMIN — MORPHINE SULFATE 4 MG: 4 INJECTION INTRAVENOUS at 16:56

## 2021-06-11 RX ADMIN — IOHEXOL 100 ML: 350 INJECTION, SOLUTION INTRAVENOUS at 17:55

## 2021-06-11 RX ADMIN — ONDANSETRON 4 MG: 2 INJECTION INTRAMUSCULAR; INTRAVENOUS at 16:57

## 2021-06-11 RX ADMIN — MORPHINE SULFATE 4 MG: 4 INJECTION INTRAVENOUS at 18:12

## 2021-06-11 ASSESSMENT — FIBROSIS 4 INDEX: FIB4 SCORE: 0.3

## 2021-06-11 ASSESSMENT — PAIN DESCRIPTION - PAIN TYPE
TYPE: ACUTE PAIN
TYPE: ACUTE PAIN

## 2021-06-11 NOTE — ED TRIAGE NOTES
Pt comes in c/o severe abdomen pain that started about 2 hours ago  Has Hx of such and has chronic pain from it  Usually pain is a 2/10  Now 10/10  Pt crying  No N/V as of yet  Has order from GI for a CT however has not gotten this done yet

## 2021-06-12 NOTE — ED NOTES
Discharge instructions provided. PIV removed. Pt verbalized the understanding of discharge instructions to follow up with PCP and to return to ER if condition worsens.  Pt ambulated out of ER without difficulty.

## 2021-06-12 NOTE — ED PROVIDER NOTES
ED Provider Note    CHIEF COMPLAINT  Chief Complaint   Patient presents with   • Abdominal Pain     started about 2 hours ago         HPI  Katherine Salazar is a 35 y.o. female who presents stating that she has had increasing pain over the last 2 to 3 hours in the lower abdomen not associated with fever, chills, sweats, vomiting or diarrhea and similar to pain she has had over many months that has not been diagnosed.  She states that she does no longer get regular period Given that she has had uterine ablation.  She denies any dysuria or frequency and was just in the emergency department for similar complaints and had a negative work-up.  She is recently seen her OB/GYN who thought this may be GI and she is was sent to her gastroenterologist who states that this is not a gastroenterology problem and suggest that she return to her gynecologist.  She is frustrated and without a diagnosis of her acute on chronic abdominal pain in the lower quadrants    REVIEW OF SYSTEMS  See HPI for further details. All other systems are negative.     PAST MEDICAL HISTORY  Past Medical History:   Diagnosis Date   • ASTHMA     medications not needed   • FHx: cleft palate 2010   • History of  2010   • History of macrosomia in infant in prior pregnancy, currently pregnant 2010   • Hypoglycemia     at 16 years old   • Previous  section- desires Repeat 2010   • SUPERVISION OF HIGH-RISK PREGNANCY 2010       FAMILY HISTORY  Family History   Problem Relation Age of Onset   • Diabetes Maternal Grandfather    • Diabetes Paternal Grandmother        SOCIAL HISTORY   reports that she has never smoked. She has never used smokeless tobacco. She reports that she does not drink alcohol and does not use drugs.    SURGICAL HISTORY  Past Surgical History:   Procedure Laterality Date   • REPEAT C SECTION W TUBAL LIGATION  2010    Performed by JF DOHERTY at [facility]   • PRIMARY C SECTION    "  • TONSILLECTOMY  at age 5       CURRENT MEDICATIONS  Home Medications     Reviewed by Silvana Burk R.N. (Registered Nurse) on 06/11/21 at 1927  Med List Status: Not Addressed   Medication Last Dose Status   buPROPion (WELLBUTRIN XL) 300 MG XL tablet  Active                ALLERGIES  No Known Allergies    PHYSICAL EXAM  VITAL SIGNS: /76   Pulse 89   Temp 36.1 °C (97 °F) (Temporal)   Resp 18   Ht 1.651 m (5' 5\")   Wt 116 kg (255 lb 11.7 oz)   SpO2 95%   BMI 42.56 kg/m²    Constitutional: Well developed, Well nourished, No acute distress, Non-toxic appearance.   HENT: Normocephalic, Atraumatic, Bilateral external ears normal, Oropharynx is clear mucous membranes are moist. No oral exudates or nasal discharge.   Eyes: Pupils are equal round and reactive, EOMI, Conjunctiva normal, No discharge.   Neck: Normal range of motion, No tenderness, Supple, No stridor. No meningismus.  Lymphatic: No lymphadenopathy noted.   Cardiovascular: Regular rate and rhythm without murmur rub or gallop.  Thorax & Lungs: Clear breath sounds bilaterally without wheezes, rhonchi or rales. There is no chest wall tenderness.   Abdomen: Soft with significant tenderness of the lower quadrants bilaterally right carotid and left, the abdomen is mildly obese but otherwise non-distended. There is no rebound or guarding. No organomegaly is appreciated. Bowel sounds are normal.  Skin: Normal without rash.   Back: No CVA or spinal tenderness.   Extremities: Intact distal pulses, No edema, No tenderness, No cyanosis, No clubbing. Capillary refill is less than 2 seconds.  Musculoskeletal: Good range of motion in all major joints. No tenderness to palpation or major deformities noted.   Neurologic: Alert & oriented x 3, Normal motor function, Normal sensory function, No focal deficits noted. Reflexes are normal.  Psychiatric: Affect normal, Judgment normal, Mood normal. There is no suicidal ideation or patient reported hallucinations. "       RADIOLOGY/PROCEDURES  US-PELVIC COMPLETE (TRANSABDOMINAL/TRANSVAGINAL) (COMBO)   Final Result      Heterogeneous uterus with multiple fibroids. The cystic lesion seen on CT is likely a degenerated fibroid.      CT-ABDOMEN-PELVIS WITH   Final Result         1. No acute inflammatory change identified in the abdomen or pelvis.      2. There is a 9 mm low-attenuation focus in the left uterine horn, indeterminate. Further evaluation with pelvic ultrasound is recommended.            COURSE & MEDICAL DECISION MAKING  Pertinent Labs & Imaging studies reviewed. (See chart for details)  I was concerned about the possibility of mass versus surgical process versus ovarian process versus endometriosis in this patient who has had significant work-up in the past for similar pain that seems to wax and wane over the last few weeks.  She has not yet been considered for diagnostic laparoscopy.    Laboratory evaluation reveals no leukocytosis, shift, anemia, electrolyte derangements or acidosis and lipase is normal.  Urinalysis does not show any evidence of infection.  hCG is negative    CAT scan of the abdomen pelvis reveals a 9 mm low-attenuation focus in the left uterine horn and this was followed up with pelvic ultrasound that shows a heterogenous uterus with multiple fibroids and likely what is seen on CT is a degenerate fibroid    I gave the patient morphine for pain x2 doses and this seemed to calm things down.  I am most concerned that the patient has endometriosis and may need a diagnostic laparoscopy to make a definitive diagnosis.  I do not think that at this time it is appropriate for me to put her on hormone therapy.    I have prescribed her Ultram to be used as needed for pain and she is discharged in stable condition will return if any significant change in symptoms understands her plan of care    FINAL IMPRESSION  1. Lower abdominal pain    2. Uterine leiomyoma, unspecified location             Electronically  signed by: Rosales Rodriguez M.D., 6/11/2021 7:28 PM

## 2021-06-25 ENCOUNTER — HOSPITAL ENCOUNTER (EMERGENCY)
Facility: MEDICAL CENTER | Age: 36
End: 2021-06-25
Attending: EMERGENCY MEDICINE
Payer: COMMERCIAL

## 2021-06-25 ENCOUNTER — APPOINTMENT (OUTPATIENT)
Dept: RADIOLOGY | Facility: MEDICAL CENTER | Age: 36
End: 2021-06-25
Attending: EMERGENCY MEDICINE
Payer: COMMERCIAL

## 2021-06-25 VITALS
HEIGHT: 65 IN | SYSTOLIC BLOOD PRESSURE: 123 MMHG | HEART RATE: 84 BPM | WEIGHT: 260.14 LBS | OXYGEN SATURATION: 97 % | RESPIRATION RATE: 16 BRPM | BODY MASS INDEX: 43.34 KG/M2 | TEMPERATURE: 97 F | DIASTOLIC BLOOD PRESSURE: 78 MMHG

## 2021-06-25 DIAGNOSIS — D25.9 UTERINE LEIOMYOMA, UNSPECIFIED LOCATION: ICD-10-CM

## 2021-06-25 DIAGNOSIS — R10.30 LOWER ABDOMINAL PAIN: ICD-10-CM

## 2021-06-25 LAB
ALBUMIN SERPL BCP-MCNC: 3.9 G/DL (ref 3.2–4.9)
ALBUMIN/GLOB SERPL: 1.3 G/DL
ALP SERPL-CCNC: 67 U/L (ref 30–99)
ALT SERPL-CCNC: 15 U/L (ref 2–50)
ANION GAP SERPL CALC-SCNC: 10 MMOL/L (ref 7–16)
APPEARANCE UR: ABNORMAL
AST SERPL-CCNC: 11 U/L (ref 12–45)
BACTERIA #/AREA URNS HPF: ABNORMAL /HPF
BACTERIA GENITAL QL WET PREP: NORMAL
BASOPHILS # BLD AUTO: 0.5 % (ref 0–1.8)
BASOPHILS # BLD: 0.05 K/UL (ref 0–0.12)
BILIRUB SERPL-MCNC: 0.2 MG/DL (ref 0.1–1.5)
BILIRUB UR QL STRIP.AUTO: NEGATIVE
BUN SERPL-MCNC: 11 MG/DL (ref 8–22)
CALCIUM SERPL-MCNC: 9 MG/DL (ref 8.4–10.2)
CHLORIDE SERPL-SCNC: 102 MMOL/L (ref 96–112)
CO2 SERPL-SCNC: 24 MMOL/L (ref 20–33)
COLOR UR: YELLOW
CREAT SERPL-MCNC: 0.68 MG/DL (ref 0.5–1.4)
EOSINOPHIL # BLD AUTO: 0.23 K/UL (ref 0–0.51)
EOSINOPHIL NFR BLD: 2.1 % (ref 0–6.9)
EPI CELLS #/AREA URNS HPF: ABNORMAL /HPF
ERYTHROCYTE [DISTWIDTH] IN BLOOD BY AUTOMATED COUNT: 39.8 FL (ref 35.9–50)
GLOBULIN SER CALC-MCNC: 3 G/DL (ref 1.9–3.5)
GLUCOSE SERPL-MCNC: 84 MG/DL (ref 65–99)
GLUCOSE UR STRIP.AUTO-MCNC: NEGATIVE MG/DL
HCG SERPL QL: NEGATIVE
HCT VFR BLD AUTO: 40.1 % (ref 37–47)
HGB BLD-MCNC: 13.6 G/DL (ref 12–16)
IMM GRANULOCYTES # BLD AUTO: 0.05 K/UL (ref 0–0.11)
IMM GRANULOCYTES NFR BLD AUTO: 0.5 % (ref 0–0.9)
KETONES UR STRIP.AUTO-MCNC: NEGATIVE MG/DL
LEUKOCYTE ESTERASE UR QL STRIP.AUTO: NEGATIVE
LIPASE SERPL-CCNC: 40 U/L (ref 7–58)
LYMPHOCYTES # BLD AUTO: 2.51 K/UL (ref 1–4.8)
LYMPHOCYTES NFR BLD: 23.3 % (ref 22–41)
MCH RBC QN AUTO: 29.8 PG (ref 27–33)
MCHC RBC AUTO-ENTMCNC: 33.9 G/DL (ref 33.6–35)
MCV RBC AUTO: 87.7 FL (ref 81.4–97.8)
MICRO URNS: ABNORMAL
MONOCYTES # BLD AUTO: 0.64 K/UL (ref 0–0.85)
MONOCYTES NFR BLD AUTO: 5.9 % (ref 0–13.4)
NEUTROPHILS # BLD AUTO: 7.28 K/UL (ref 2–7.15)
NEUTROPHILS NFR BLD: 67.7 % (ref 44–72)
NITRITE UR QL STRIP.AUTO: NEGATIVE
NRBC # BLD AUTO: 0 K/UL
NRBC BLD-RTO: 0 /100 WBC
PH UR STRIP.AUTO: 6 [PH] (ref 5–8)
PLATELET # BLD AUTO: 390 K/UL (ref 164–446)
PMV BLD AUTO: 8.9 FL (ref 9–12.9)
POTASSIUM SERPL-SCNC: 4 MMOL/L (ref 3.6–5.5)
PROT SERPL-MCNC: 6.9 G/DL (ref 6–8.2)
PROT UR QL STRIP: NEGATIVE MG/DL
RBC # BLD AUTO: 4.57 M/UL (ref 4.2–5.4)
RBC # URNS HPF: ABNORMAL /HPF
RBC UR QL AUTO: ABNORMAL
SIGNIFICANT IND 70042: NORMAL
SITE SITE: NORMAL
SODIUM SERPL-SCNC: 136 MMOL/L (ref 135–145)
SOURCE SOURCE: NORMAL
SP GR UR STRIP.AUTO: 1.02
WBC # BLD AUTO: 10.8 K/UL (ref 4.8–10.8)
WBC #/AREA URNS HPF: ABNORMAL /HPF

## 2021-06-25 PROCEDURE — 87591 N.GONORRHOEAE DNA AMP PROB: CPT

## 2021-06-25 PROCEDURE — 80053 COMPREHEN METABOLIC PANEL: CPT

## 2021-06-25 PROCEDURE — 83690 ASSAY OF LIPASE: CPT

## 2021-06-25 PROCEDURE — 76856 US EXAM PELVIC COMPLETE: CPT

## 2021-06-25 PROCEDURE — 81001 URINALYSIS AUTO W/SCOPE: CPT

## 2021-06-25 PROCEDURE — 85025 COMPLETE CBC W/AUTO DIFF WBC: CPT

## 2021-06-25 PROCEDURE — 84703 CHORIONIC GONADOTROPIN ASSAY: CPT

## 2021-06-25 PROCEDURE — 87491 CHLMYD TRACH DNA AMP PROBE: CPT

## 2021-06-25 PROCEDURE — 700111 HCHG RX REV CODE 636 W/ 250 OVERRIDE (IP): Performed by: EMERGENCY MEDICINE

## 2021-06-25 PROCEDURE — 99285 EMERGENCY DEPT VISIT HI MDM: CPT

## 2021-06-25 PROCEDURE — 36415 COLL VENOUS BLD VENIPUNCTURE: CPT

## 2021-06-25 PROCEDURE — 96374 THER/PROPH/DIAG INJ IV PUSH: CPT

## 2021-06-25 PROCEDURE — 96375 TX/PRO/DX INJ NEW DRUG ADDON: CPT

## 2021-06-25 RX ORDER — HYDROCODONE BITARTRATE AND ACETAMINOPHEN 5; 325 MG/1; MG/1
1 TABLET ORAL EVERY 8 HOURS PRN
Qty: 9 TABLET | Refills: 0 | Status: SHIPPED | OUTPATIENT
Start: 2021-06-25 | End: 2021-06-28

## 2021-06-25 RX ORDER — ONDANSETRON 4 MG/1
4 TABLET, ORALLY DISINTEGRATING ORAL ONCE
Status: COMPLETED | OUTPATIENT
Start: 2021-06-25 | End: 2021-06-25

## 2021-06-25 RX ORDER — ONDANSETRON 2 MG/ML
4 INJECTION INTRAMUSCULAR; INTRAVENOUS ONCE
Status: COMPLETED | OUTPATIENT
Start: 2021-06-25 | End: 2021-06-25

## 2021-06-25 RX ORDER — KETOROLAC TROMETHAMINE 30 MG/ML
15 INJECTION, SOLUTION INTRAMUSCULAR; INTRAVENOUS ONCE
Status: COMPLETED | OUTPATIENT
Start: 2021-06-25 | End: 2021-06-25

## 2021-06-25 RX ORDER — MORPHINE SULFATE 10 MG/ML
8 INJECTION, SOLUTION INTRAMUSCULAR; INTRAVENOUS ONCE
Status: COMPLETED | OUTPATIENT
Start: 2021-06-25 | End: 2021-06-25

## 2021-06-25 RX ADMIN — KETOROLAC TROMETHAMINE 15 MG: 30 INJECTION, SOLUTION INTRAMUSCULAR; INTRAVENOUS at 18:58

## 2021-06-25 RX ADMIN — ONDANSETRON 4 MG: 4 TABLET, ORALLY DISINTEGRATING ORAL at 21:50

## 2021-06-25 RX ADMIN — ONDANSETRON 4 MG: 2 INJECTION INTRAMUSCULAR; INTRAVENOUS at 19:44

## 2021-06-25 RX ADMIN — MORPHINE SULFATE 8 MG: 10 INJECTION INTRAVENOUS at 19:43

## 2021-06-25 ASSESSMENT — FIBROSIS 4 INDEX: FIB4 SCORE: 0.27

## 2021-06-25 ASSESSMENT — PAIN DESCRIPTION - DESCRIPTORS: DESCRIPTORS: ACHING

## 2021-06-26 LAB
C TRACH DNA SPEC QL NAA+PROBE: NEGATIVE
N GONORRHOEA DNA SPEC QL NAA+PROBE: NEGATIVE
SPECIMEN SOURCE: NORMAL

## 2021-06-26 NOTE — ED PROVIDER NOTES
ED Provider Note    CHIEF COMPLAINT  Chief Complaint   Patient presents with   • Abdominal Pain   • Flank Pain   • Fibroids       HPI  Patient is a 35-year-old female with past medical history of fibroids who presents emergency room for increasing pain over the last several days.  She has had this chronic pain for some time and was seen and evaluated here in the emergency room on  where after both a CT scan showing a uterine cystic structure pelvic ultrasound identifying a fibroid she has gone home and tried over-the-counter medications and has a follow-up appointment with her gynecologist on .  She has had increasing pain and discomfort and now describes the addition of some urinary frequency and dysuria along with a smidge amount of vaginal discharge.  She has not had any vaginal bleeding, she denies any rectal pain nor she had any abdominal normal bowel movements.  She has not had any gross abdominal distention, no chest pains, fevers or chills.    PPE Note: I personally donned full PPE for all patient encounters during this visit, including being clean-shaven with an N95 respirator mask, gloves, and goggles.       REVIEW OF SYSTEMS  See HPI for further details. All other systems are negative.     PAST MEDICAL HISTORY   has a past medical history of ASTHMA, FHx: cleft palate (2010), History of  (2010), History of macrosomia in infant in prior pregnancy, currently pregnant (2010), Hypoglycemia, Previous  section- desires Repeat (2010), and SUPERVISION OF HIGH-RISK PREGNANCY (2010).    SOCIAL HISTORY  Social History     Tobacco Use   • Smoking status: Never Smoker   • Smokeless tobacco: Never Used   Vaping Use   • Vaping Use: Never used   Substance and Sexual Activity   • Alcohol use: Never   • Drug use: Never     Comment:  CBD    • Sexual activity: Yes     Partners: Male     SURGICAL HISTORY   has a past surgical history that includes tonsillectomy (at age 5);  "primary c section (2008); and repeat c section w tubal ligation (9/9/2010).    CURRENT MEDICATIONS  Home Medications    **Home medications have not yet been reviewed for this encounter**       ALLERGIES  No Known Allergies    PHYSICAL EXAM  VITAL SIGNS: /78   Pulse 84   Temp 36.1 °C (97 °F) (Temporal)   Resp 16   Ht 1.651 m (5' 5\")   Wt 118 kg (260 lb 2.3 oz)   SpO2 97%   BMI 43.29 kg/m²   Pulse ox interpretation: I interpret this pulse ox as normal.  Genl: F sitting in gurney uncomfortably, speaking clearly, appears in moderate distress  Head: NC/AT   ENT: Mucous membranes moist, posterior pharynx clear, uvula midline, nares patent bilaterally   Eyes: Normal sclera, pupils equal round reactive to light  Neck: Supple, FROM, no LAD appreciated  Pulmonary: Lungs are clear to auscultation bilaterally  Chest: No TTP  CV:  RRR, no murmur appreciated, pulses 2+ in both upper and lower extremities,  Abdomen: soft, NT/ND; no rebound/guarding, no masses palpated, no HSM  : no CVA tenderness, positive suprapubic tenderness.  Pelvic exam: Normal external female genitalia, vaginal wall has very scant erythema, white physiologic discharge is noted, cervix is normal in appearance with no cervical motion tenderness, no blood or pus in the posterior fornix.  No adnexal tenderness.  Musculoskeletal: Pain free ROM of the neck. Moving upper and lower extremities and spontaneous in coordinated fashion  Neuro: A&Ox4 (person, place, time, situation), speech fluent, gait steady, no focal deficits appreciated  Psych: Patient has an appropriate affect and behavior  Skin: No rash or lesions.  No pallor or jaundice.  No cyanosis.  Warm and dry.     DIAGNOSTIC STUDIES / PROCEDURES    LABS  Labs Reviewed   CBC WITH DIFFERENTIAL - Abnormal; Notable for the following components:       Result Value    MPV 8.9 (*)     Neutrophils (Absolute) 7.28 (*)     All other components within normal limits   COMP METABOLIC PANEL - Abnormal; " Notable for the following components:    AST(SGOT) 11 (*)     All other components within normal limits   URINALYSIS,CULTURE IF INDICATED - Abnormal; Notable for the following components:    Character Hazy (*)     Occult Blood Trace (*)     All other components within normal limits    Narrative:     Indication for culture:->Patient WITHOUT an indwelling Rosenberg  catheter in place with new onset of Dysuria, Frequency,  Urgency, and/or Suprapubic pain   URINE MICROSCOPIC (W/UA) - Abnormal; Notable for the following components:    RBC 2-5 (*)     Bacteria Few (*)     All other components within normal limits    Narrative:     Indication for culture:->Patient WITHOUT an indwelling Rosenberg  catheter in place with new onset of Dysuria, Frequency,  Urgency, and/or Suprapubic pain   LIPASE   HCG QUAL SERUM   ESTIMATED GFR   WET PREP   CHLAMYDIA/GC PCR URINE OR SWAB    Narrative:     Indication for culture:->Patient WITHOUT an indwelling Rosenberg  catheter in place with new onset of Dysuria, Frequency,  Urgency, and/or Suprapubic pain     RADIOLOGY  US-PELVIC COMPLETE (TRANSABDOMINAL/TRANSVAGINAL) (COMBO)   Final Result      Heterogeneous uterus with multiple fibroids, similar to prior.      Unremarkable bilateral ovaries.        COURSE & MEDICAL DECISION MAKING  Pertinent Labs & Imaging studies reviewed. (See chart for details)    DDX:  Colitis, UTI, nephrolithiasis, vaginitis, Uterine leiyomyoma, AVM, endometrial polyp, adenomyosis, ovulatory dysfunction, endometrial carcinoma, other neoplasia, endometritis, anemia, orthostatic hypotension.    MDM    Initial evaluation at 1900:  Patient was seen and evaluated for symptoms as described above.  On initial assessment she was appearing to be in a moderate amount of pain and had hypertension with nonlocalizing lower abdominal exam findings.  Prior encounter is reviewed and the patient presented with overall very similar symptoms and had both CT and ultrasound at that time.  The  interval duration and global complaints did not appear to be substantially worse or changed however she does endorse the possibility of some dysuria or vaginal discharge and a vaginal exam was performed with only visualized physiologic discharge and no findings consistent with cervical motion tenderness or PID.  She initially was given Toradol with minimal improvement and a single dose of morphine thereafter with substantial improvement.  Lab work shows no leukocytosis or leftward shift, no gross electrolyte abnormalities, no other localizing source such as hepatobiliary dysfunction, kidney injury or vaginitis.  Urinalysis is unremarkable for stone or infectious source and ultrasound was repeated that shows a heterogeneous uterus with multiple fibroids, similar to the prior exam.  Patient has outpatient follow-up in the coming week, will be giving short course of narcotic medications to help her with a substantial pain has been intolerant to OTC medications.  Narcotic review website was used and patient has had some intermittent tramadol prescriptions of short duration with no active narcotics at this time.  I believe a 3-day course is reasonable for her acute symptomology as the pain is not out of proportion to the diagnosis and she has follow-up planned with the appropriate specialist.  Vital signs at the time of discharge are reassuring, she is tolerating p.o. intake and overall she is given very strict return precautions    FINAL IMPRESSION  Visit Diagnoses     ICD-10-CM   1. Lower abdominal pain  R10.30   2. Uterine leiomyoma, unspecified location  D25.9     Electronically signed by: Silverio Zaragoza M.D., 6/25/2021 6:54 PM

## 2021-06-26 NOTE — ED NOTES
Discharge instructions provided.  Pt verbalized the understanding of discharge instructions to follow up with PCP and to return to ER if condition worsens.  Pt ambulated out of ER without difficulty.   Narcotic consent form signed.

## 2021-06-26 NOTE — ED TRIAGE NOTES
"Presents complaining of diffuse abdominal pain, flank pain, and uterine pain (leiomyomata) recurring since this past May.   Chief Complaint   Patient presents with   • Abdominal Pain   • Flank Pain     BP (!) 170/99   Pulse 90   Temp 36.1 °C (97 °F) (Temporal)   Resp 20   Ht 1.651 m (5' 5\")   Wt 118 kg (260 lb 2.3 oz)   SpO2 99%   BMI 43.29 kg/m²      "

## 2021-08-25 ENCOUNTER — TELEPHONE (OUTPATIENT)
Dept: SCHEDULING | Facility: IMAGING CENTER | Age: 36
End: 2021-08-25

## 2021-08-25 ENCOUNTER — OFFICE VISIT (OUTPATIENT)
Dept: MEDICAL GROUP | Facility: PHYSICIAN GROUP | Age: 36
End: 2021-08-25
Payer: OTHER GOVERNMENT

## 2021-08-25 VITALS
HEART RATE: 99 BPM | HEIGHT: 65 IN | SYSTOLIC BLOOD PRESSURE: 120 MMHG | DIASTOLIC BLOOD PRESSURE: 72 MMHG | RESPIRATION RATE: 12 BRPM | BODY MASS INDEX: 48.82 KG/M2 | TEMPERATURE: 98.6 F | OXYGEN SATURATION: 97 % | WEIGHT: 293 LBS

## 2021-08-25 DIAGNOSIS — Z86.32 HISTORY OF GESTATIONAL DIABETES: ICD-10-CM

## 2021-08-25 DIAGNOSIS — R73.09 ELEVATED GLUCOSE: ICD-10-CM

## 2021-08-25 DIAGNOSIS — Z00.00 ANNUAL PHYSICAL EXAM: ICD-10-CM

## 2021-08-25 DIAGNOSIS — Z90.711 HISTORY OF PARTIAL HYSTERECTOMY: ICD-10-CM

## 2021-08-25 DIAGNOSIS — E66.01 MORBID OBESITY WITH BMI OF 45.0-49.9, ADULT (HCC): ICD-10-CM

## 2021-08-25 PROBLEM — Z90.710 H/O TOTAL HYSTERECTOMY: Status: ACTIVE | Noted: 2021-08-25

## 2021-08-25 PROBLEM — Z86.16 HISTORY OF COVID-19: Status: ACTIVE | Noted: 2021-08-25

## 2021-08-25 PROCEDURE — 99214 OFFICE O/P EST MOD 30 MIN: CPT | Performed by: NURSE PRACTITIONER

## 2021-08-25 RX ORDER — TRAMADOL HYDROCHLORIDE 50 MG/1
50 TABLET ORAL
COMMUNITY
Start: 2021-05-12 | End: 2021-08-25

## 2021-08-25 RX ORDER — BUPROPION HYDROCHLORIDE 300 MG/1
300 TABLET ORAL
COMMUNITY
End: 2021-09-12

## 2021-08-25 RX ORDER — HYDROCODONE BITARTRATE AND ACETAMINOPHEN 5; 325 MG/1; MG/1
TABLET ORAL
COMMUNITY
End: 2021-08-25

## 2021-08-25 RX ORDER — ALBUTEROL SULFATE 90 UG/1
AEROSOL, METERED RESPIRATORY (INHALATION)
COMMUNITY
End: 2021-09-12

## 2021-08-25 RX ORDER — ONDANSETRON 4 MG/1
4 TABLET, ORALLY DISINTEGRATING ORAL
COMMUNITY
Start: 2021-05-12 | End: 2021-08-25

## 2021-08-25 SDOH — ECONOMIC STABILITY: FOOD INSECURITY: WITHIN THE PAST 12 MONTHS, THE FOOD YOU BOUGHT JUST DIDN'T LAST AND YOU DIDN'T HAVE MONEY TO GET MORE.: NEVER TRUE

## 2021-08-25 SDOH — ECONOMIC STABILITY: INCOME INSECURITY: IN THE LAST 12 MONTHS, WAS THERE A TIME WHEN YOU WERE NOT ABLE TO PAY THE MORTGAGE OR RENT ON TIME?: NO

## 2021-08-25 SDOH — HEALTH STABILITY: MENTAL HEALTH
STRESS IS WHEN SOMEONE FEELS TENSE, NERVOUS, ANXIOUS, OR CAN'T SLEEP AT NIGHT BECAUSE THEIR MIND IS TROUBLED. HOW STRESSED ARE YOU?: VERY MUCH

## 2021-08-25 SDOH — ECONOMIC STABILITY: HOUSING INSECURITY
IN THE LAST 12 MONTHS, WAS THERE A TIME WHEN YOU DID NOT HAVE A STEADY PLACE TO SLEEP OR SLEPT IN A SHELTER (INCLUDING NOW)?: NO

## 2021-08-25 SDOH — ECONOMIC STABILITY: FOOD INSECURITY: WITHIN THE PAST 12 MONTHS, YOU WORRIED THAT YOUR FOOD WOULD RUN OUT BEFORE YOU GOT MONEY TO BUY MORE.: NEVER TRUE

## 2021-08-25 SDOH — HEALTH STABILITY: PHYSICAL HEALTH: ON AVERAGE, HOW MANY MINUTES DO YOU ENGAGE IN EXERCISE AT THIS LEVEL?: 30 MIN

## 2021-08-25 SDOH — ECONOMIC STABILITY: TRANSPORTATION INSECURITY
IN THE PAST 12 MONTHS, HAS THE LACK OF TRANSPORTATION KEPT YOU FROM MEDICAL APPOINTMENTS OR FROM GETTING MEDICATIONS?: NO

## 2021-08-25 SDOH — ECONOMIC STABILITY: INCOME INSECURITY: HOW HARD IS IT FOR YOU TO PAY FOR THE VERY BASICS LIKE FOOD, HOUSING, MEDICAL CARE, AND HEATING?: NOT HARD AT ALL

## 2021-08-25 SDOH — ECONOMIC STABILITY: TRANSPORTATION INSECURITY
IN THE PAST 12 MONTHS, HAS LACK OF TRANSPORTATION KEPT YOU FROM MEETINGS, WORK, OR FROM GETTING THINGS NEEDED FOR DAILY LIVING?: NO

## 2021-08-25 SDOH — ECONOMIC STABILITY: TRANSPORTATION INSECURITY
IN THE PAST 12 MONTHS, HAS LACK OF RELIABLE TRANSPORTATION KEPT YOU FROM MEDICAL APPOINTMENTS, MEETINGS, WORK OR FROM GETTING THINGS NEEDED FOR DAILY LIVING?: NO

## 2021-08-25 SDOH — HEALTH STABILITY: PHYSICAL HEALTH: ON AVERAGE, HOW MANY DAYS PER WEEK DO YOU ENGAGE IN MODERATE TO STRENUOUS EXERCISE (LIKE A BRISK WALK)?: 3 DAYS

## 2021-08-25 SDOH — ECONOMIC STABILITY: HOUSING INSECURITY: IN THE LAST 12 MONTHS, HOW MANY PLACES HAVE YOU LIVED?: 1

## 2021-08-25 ASSESSMENT — ENCOUNTER SYMPTOMS
ORTHOPNEA: 0
ABDOMINAL PAIN: 0
CHILLS: 0
PALPITATIONS: 0
HEADACHES: 0
COUGH: 0
CONSTIPATION: 0
BACK PAIN: 1
NAUSEA: 0
WEAKNESS: 0
SORE THROAT: 0
BLOOD IN STOOL: 0
SHORTNESS OF BREATH: 0
EYES NEGATIVE: 1
WHEEZING: 1
FEVER: 0
DIZZINESS: 0
VOMITING: 0
DIARRHEA: 1
WEIGHT LOSS: 0
DEPRESSION: 0

## 2021-08-25 ASSESSMENT — SOCIAL DETERMINANTS OF HEALTH (SDOH)
DO YOU BELONG TO ANY CLUBS OR ORGANIZATIONS SUCH AS CHURCH GROUPS UNIONS, FRATERNAL OR ATHLETIC GROUPS, OR SCHOOL GROUPS?: YES
HOW OFTEN DO YOU HAVE A DRINK CONTAINING ALCOHOL: NEVER
IN A TYPICAL WEEK, HOW MANY TIMES DO YOU TALK ON THE PHONE WITH FAMILY, FRIENDS, OR NEIGHBORS?: ONCE A WEEK
IN A TYPICAL WEEK, HOW MANY TIMES DO YOU TALK ON THE PHONE WITH FAMILY, FRIENDS, OR NEIGHBORS?: ONCE A WEEK
WITHIN THE PAST 12 MONTHS, YOU WORRIED THAT YOUR FOOD WOULD RUN OUT BEFORE YOU GOT THE MONEY TO BUY MORE: NEVER TRUE
HOW OFTEN DO YOU ATTENT MEETINGS OF THE CLUB OR ORGANIZATION YOU BELONG TO?: MORE THAN 4 TIMES PER YEAR
HOW OFTEN DO YOU GET TOGETHER WITH FRIENDS OR RELATIVES?: ONCE A WEEK
HOW OFTEN DO YOU HAVE SIX OR MORE DRINKS ON ONE OCCASION: NEVER
HOW OFTEN DO YOU ATTEND CHURCH OR RELIGIOUS SERVICES?: NEVER
DO YOU BELONG TO ANY CLUBS OR ORGANIZATIONS SUCH AS CHURCH GROUPS UNIONS, FRATERNAL OR ATHLETIC GROUPS, OR SCHOOL GROUPS?: YES
HOW OFTEN DO YOU GET TOGETHER WITH FRIENDS OR RELATIVES?: ONCE A WEEK
HOW OFTEN DO YOU ATTENT MEETINGS OF THE CLUB OR ORGANIZATION YOU BELONG TO?: MORE THAN 4 TIMES PER YEAR
HOW OFTEN DO YOU ATTEND CHURCH OR RELIGIOUS SERVICES?: NEVER
HOW HARD IS IT FOR YOU TO PAY FOR THE VERY BASICS LIKE FOOD, HOUSING, MEDICAL CARE, AND HEATING?: NOT HARD AT ALL

## 2021-08-25 ASSESSMENT — LIFESTYLE VARIABLES
HOW OFTEN DO YOU HAVE A DRINK CONTAINING ALCOHOL: NEVER
HOW OFTEN DO YOU HAVE SIX OR MORE DRINKS ON ONE OCCASION: NEVER

## 2021-08-25 ASSESSMENT — FIBROSIS 4 INDEX: FIB4 SCORE: 0.25

## 2021-08-25 ASSESSMENT — PATIENT HEALTH QUESTIONNAIRE - PHQ9: CLINICAL INTERPRETATION OF PHQ2 SCORE: 0

## 2021-08-25 NOTE — LETTER
[facility]  TIMOTHY Mcfadden  [address]  [facility] [address]  Fax: [phone]   Authorization for Release/Disclosure of   Protected Health Information   Name: KATHERINE CUETO : 1985 SSN: [id number]   Address: [address] [place] [id number] Phone:    [phone] (home)    I authorize the entity listed below to release/disclose the PHI below to:   [facility]/TIMOTHY Mcfadden and TIMOTHY Mcfadden   Provider or Entity Name:     Address   City, State, Zip   Phone:      Fax:     Reason for request: continuity of care   Information to be released:    [  ] LAST COLONOSCOPY,  including any PATH REPORT and follow-up  [  ] LAST FIT/COLOGUARD RESULT [  ] LAST DEXA  [  ] LAST MAMMOGRAM  [  ] LAST PAP  [  ] LAST LABS [  ] RETINA EXAM REPORT  [  ] IMMUNIZATION RECORDS  [  ] Release all info      [  ] Check here and initial the line next to each item to release ALL health information INCLUDING  _____ Care and treatment for drug and / or alcohol abuse  _____ HIV testing, infection status, or AIDS  _____ Genetic Testing    DATES OF SERVICE OR TIME PERIOD TO BE DISCLOSED: _____________  I understand and acknowledge that:  * This Authorization may be revoked at any time by you in writing, except if your health information has already been used or disclosed.  * Your health information that will be used or disclosed as a result of you signing this authorization could be re-disclosed by the recipient. If this occurs, your re-disclosed health information may no longer be protected by State or Federal laws.  * You may refuse to sign this Authorization. Your refusal will not affect your ability to obtain treatment.  * This Authorization becomes effective upon signing and will  on (date) __________.      If no date is indicated, this Authorization will  one (1) year from the signature date.    Name: Katherine CUETO    Signature:   Date:      8/25/2021       PLEASE FAX REQUESTED RECORDS BACK TO: [phone]

## 2021-08-26 NOTE — PATIENT INSTRUCTIONS
1. Get labs completed prior to our next visit.  These will be fasting labs, do not eat or drink 8 to 10 hours prior to your labs.  Make sure that you drink lots of water.  We will discuss the results of these at our next appointment.

## 2021-08-26 NOTE — PROGRESS NOTES
Chief Complaint   Patient presents with   • Establish Care      Subjective:     Katherine CUETO is a 35 y.o. female presenting to [facility] care and management of:      History of partial hysterectomy  Chronic and stable condition   Had a laparoscopic hysterectomy 7/9/2021  Still has ovaries  Removed due to fluid buildup in fallopian tubes    History of gestational diabetes  History of during both pregnancies   Familial history      History of COVID-19 December 2020    Morbid obesity with BMI of 45.0-49.9, adult (HCC)  Chronic and stable condition   Noted a 15 pound weight gain since having her hysterectomy due to having stomach out   currently working on protein shakes and gym  Attempting to eat better       Review of Systems   Constitutional: Negative for chills, fever and weight loss.   HENT: Positive for congestion, ear pain, hearing loss and tinnitus. Negative for sore throat.         Feels like fluid in both ears, more sensitive to hearing    Congestion from smoke   Eyes: Negative.    Respiratory: Positive for wheezing. Negative for cough and shortness of breath.         Feels it could be due to the smoke   Cardiovascular: Negative for chest pain, palpitations, orthopnea and leg swelling.   Gastrointestinal: Positive for diarrhea. Negative for abdominal pain, blood in stool, constipation, nausea and vomiting.        When eating fatty foods due to no galbladder    Genitourinary: Negative for dysuria and hematuria.   Musculoskeletal: Positive for back pain.        Feels that it is due to her weight, posture and sitting at work desk   Skin: Positive for itching and rash.        Right lower extremity anterior - since February    Neurological: Negative for dizziness, weakness and headaches.   Psychiatric/Behavioral: Negative for depression and suicidal ideas.            Current Outpatient Medications:   •  albuterol 108 (90 Base) MCG/ACT Aero Soln inhalation aerosol, Inhale., Disp: , Rfl:   •  buPROPion  "(WELLBUTRIN XL) 300 MG XL tablet, Take 300 mg by mouth., Disp: , Rfl:     Past Medical History:   Diagnosis Date   • ASTHMA     medications not needed   • FHx: cleft palate 2010   • FHx: cleft palate 2010    Mother, brother, uncle (maternal), and aunt (maternal) with cleft palate    • GBS (group B Streptococcus carrier), +RV culture, currently pregnant 2010   • History of  2010   • History of macrosomia in infant in prior pregnancy, currently pregnant 2010   • History of macrosomia in infant in prior pregnancy, currently pregnant 2010    9lb5oz    • Hypoglycemia     at 16 years old   • Previous  section- desires Repeat 2010   • SUPERVISION OF HIGH-RISK PREGNANCY 2010   • SUPERVISION OF HIGH-RISK PREGNANCY 2010       Past Surgical History:   Procedure Laterality Date   • HYSTERECTOMY LAPAROSCOPY  2021    still has ovaries   • CHOLECYSTECTOMY  10/2020   • REPEAT C SECTION W TUBAL LIGATION  2010    Performed by JF DOHERTY at LABOR AND DELIVERY   • PRIMARY C SECTION     • TONSILLECTOMY  at age 5       Family History   Problem Relation Age of Onset   • Lupus Mother    • Heart Disease Mother    • Diabetes Father    • Heart Disease Father    • Diabetes Brother    • Heart Disease Maternal Uncle         MI   • Diabetes Maternal Grandfather    • Parkinson's Disease Paternal Grandmother    • Diabetes Paternal Grandmother    • Cancer Sister         cervical cancer   • Alcohol abuse Paternal Aunt    • Heart Disease Paternal Grandfather        Chocolate, Citrus, Latex, Peanut oil, Tomato, Acetaminophen, and Morphine    Allergies, past medical history, past surgical history, family history, social history reviewed and updated    Objective:     Vitals: /72   Pulse 99   Temp 37 °C (98.6 °F) (Temporal)   Resp 12   Ht 1.651 m (5' 5\")   Wt (!) 134 kg (295 lb)   LMP 2012   SpO2 97%   BMI 49.09 kg/m²   General: Alert, pleasant, NAD  EYES: "   PERRL, EOMI, no icterus or pallor.  Conjunctivae and lids normal.   HENT:  Normocephalic.  External ears normal. Tympanic membranes pearly, opaque.  No nasal drainage present.  Oropharynx non-erythematous, mucous membranes moist.  Neck supple.   No thyromegaly or masses palpated. No cervical or supraclavicular lymphadenopathy.  Heart: Regular rate and rhythm.  S1 and S2 normal.  No murmurs appreciated.  Respiratory: Normal respiratory effort.  Clear to auscultation bilaterally.  Abdomen: Non-distended, soft, non-tender, no guarding/rebound. Bowel sounds present.  No hepatosplenomegaly.  No hernias.  Skin: Warm, dry, no rashes.  Musculoskeletal: Gait is normal.  Moves all extremities well.    Extremities: No clubbing, cyanosis or edema noted.   Neurological: No tremors, sensation grossly intact, tone/strength normal, gait is normal, CN2-12 intact.  Psych:  Affect/mood is normal, judgement is good, memory is intact, grooming is appropriate.    Assessment/Plan:     1. Annual physical exam  - Comp Metabolic Panel; Future  - TSH WITH REFLEX TO FT4; Future  - HEMOGLOBIN A1C; Future    2. Elevated glucose  - HEMOGLOBIN A1C; Future    3. History of partial hysterectomy  Will continue to monitor    4. History of gestational diabetes  - Comp Metabolic Panel; Future  - HEMOGLOBIN A1C; Future    5. Morbid obesity with BMI of 45.0-49.9, adult (HCC)  - Patient identified as having weight management issue.  Appropriate orders and counseling given.  - TSH WITH REFLEX TO FT4; Future      Discussed with patient possible alternative diagnoses, patient is to take all medications as prescribed.     If symptoms persist FU w/PCP, if symptoms worsen go to emergency room.     If experiencing any side effects from prescribed medications reports to the office immediately or go to emergency room.    Reviewed indication, dosage, usage and potential adverse effects of prescribed medications.     Reviewed risks and benefits of treatment plan.  Patient verbalizes understanding of all instruction and verbally agrees to plan.    Discussed plan with the patient, and she agrees to the above.      I personally reviewed prior external notes and test results pertinent to today's visit.        Return in about 4 weeks (around 9/22/2021) for follow up labs.    My total time spent caring for the patient on the day of the encounter was 30 minutes.   This does not include time spent on separately billable procedures/tests.     Please note that this dictation was created using voice recognition software. I have made every reasonable attempt to correct obvious errors, but I expect that there may be errors of grammar and possibly content that I did not discover before finalizing the note.

## 2021-08-26 NOTE — ASSESSMENT & PLAN NOTE
Chronic and stable condition   Noted a 15 pound weight gain since having her hysterectomy due to having stomach out   currently working on protein shakes and gym  Attempting to eat better

## 2021-08-26 NOTE — ASSESSMENT & PLAN NOTE
Chronic and stable condition   Had a laparoscopic hysterectomy 7/9/2021  Still has ovaries  Removed due to fluid buildup in fallopian tubes

## 2021-08-28 ENCOUNTER — HOSPITAL ENCOUNTER (OUTPATIENT)
Dept: LAB | Facility: MEDICAL CENTER | Age: 36
End: 2021-08-28
Attending: NURSE PRACTITIONER
Payer: OTHER GOVERNMENT

## 2021-08-28 DIAGNOSIS — R73.09 ELEVATED GLUCOSE: ICD-10-CM

## 2021-08-28 DIAGNOSIS — Z00.00 ANNUAL PHYSICAL EXAM: ICD-10-CM

## 2021-08-28 DIAGNOSIS — E66.01 MORBID OBESITY WITH BMI OF 45.0-49.9, ADULT (HCC): ICD-10-CM

## 2021-08-28 DIAGNOSIS — Z86.32 HISTORY OF GESTATIONAL DIABETES: ICD-10-CM

## 2021-08-28 LAB
ALBUMIN SERPL BCP-MCNC: 4.2 G/DL (ref 3.2–4.9)
ALBUMIN/GLOB SERPL: 1.6 G/DL
ALP SERPL-CCNC: 69 U/L (ref 30–99)
ALT SERPL-CCNC: 20 U/L (ref 2–50)
ANION GAP SERPL CALC-SCNC: 11 MMOL/L (ref 7–16)
AST SERPL-CCNC: 20 U/L (ref 12–45)
BILIRUB SERPL-MCNC: 0.2 MG/DL (ref 0.1–1.5)
BUN SERPL-MCNC: 12 MG/DL (ref 8–22)
CALCIUM SERPL-MCNC: 9.1 MG/DL (ref 8.5–10.5)
CHLORIDE SERPL-SCNC: 102 MMOL/L (ref 96–112)
CO2 SERPL-SCNC: 24 MMOL/L (ref 20–33)
CREAT SERPL-MCNC: 0.64 MG/DL (ref 0.5–1.4)
EST. AVERAGE GLUCOSE BLD GHB EST-MCNC: 108 MG/DL
GLOBULIN SER CALC-MCNC: 2.6 G/DL (ref 1.9–3.5)
GLUCOSE SERPL-MCNC: 104 MG/DL (ref 65–99)
HBA1C MFR BLD: 5.4 % (ref 4–5.6)
POTASSIUM SERPL-SCNC: 4.3 MMOL/L (ref 3.6–5.5)
PROT SERPL-MCNC: 6.8 G/DL (ref 6–8.2)
SODIUM SERPL-SCNC: 137 MMOL/L (ref 135–145)
TSH SERPL DL<=0.005 MIU/L-ACNC: 1.52 UIU/ML (ref 0.38–5.33)

## 2021-08-28 PROCEDURE — 84443 ASSAY THYROID STIM HORMONE: CPT

## 2021-08-28 PROCEDURE — 83036 HEMOGLOBIN GLYCOSYLATED A1C: CPT

## 2021-08-28 PROCEDURE — 36415 COLL VENOUS BLD VENIPUNCTURE: CPT

## 2021-08-28 PROCEDURE — 80053 COMPREHEN METABOLIC PANEL: CPT

## 2021-09-12 ENCOUNTER — OFFICE VISIT (OUTPATIENT)
Dept: URGENT CARE | Facility: CLINIC | Age: 36
End: 2021-09-12
Payer: OTHER GOVERNMENT

## 2021-09-12 VITALS
RESPIRATION RATE: 18 BRPM | TEMPERATURE: 97.1 F | BODY MASS INDEX: 43.43 KG/M2 | HEART RATE: 91 BPM | HEIGHT: 65 IN | SYSTOLIC BLOOD PRESSURE: 132 MMHG | WEIGHT: 260.7 LBS | OXYGEN SATURATION: 96 % | DIASTOLIC BLOOD PRESSURE: 86 MMHG

## 2021-09-12 DIAGNOSIS — R05.9 COUGH: ICD-10-CM

## 2021-09-12 DIAGNOSIS — J20.9 ACUTE BRONCHITIS, UNSPECIFIED ORGANISM: ICD-10-CM

## 2021-09-12 PROCEDURE — 99213 OFFICE O/P EST LOW 20 MIN: CPT | Performed by: NURSE PRACTITIONER

## 2021-09-12 RX ORDER — BENZONATATE 200 MG/1
200 CAPSULE ORAL 3 TIMES DAILY PRN
Qty: 60 CAPSULE | Refills: 0 | Status: SHIPPED | OUTPATIENT
Start: 2021-09-12 | End: 2021-09-30

## 2021-09-12 RX ORDER — DOXYCYCLINE HYCLATE 100 MG/1
100 CAPSULE ORAL 2 TIMES DAILY
Qty: 14 CAPSULE | Refills: 0 | Status: SHIPPED | OUTPATIENT
Start: 2021-09-12 | End: 2021-09-19

## 2021-09-12 RX ORDER — ALBUTEROL SULFATE 90 UG/1
2 AEROSOL, METERED RESPIRATORY (INHALATION) EVERY 6 HOURS PRN
Qty: 8.5 G | Refills: 1 | Status: SHIPPED
Start: 2021-09-12 | End: 2022-03-27

## 2021-09-12 RX ORDER — PREDNISONE 20 MG/1
TABLET ORAL
Qty: 10 TABLET | Refills: 0 | Status: SHIPPED | OUTPATIENT
Start: 2021-09-12 | End: 2021-09-30

## 2021-09-12 ASSESSMENT — ENCOUNTER SYMPTOMS
SHORTNESS OF BREATH: 1
WHEEZING: 1
CHILLS: 0
DIARRHEA: 0
HEADACHES: 0
NAUSEA: 0
SPUTUM PRODUCTION: 1
DIZZINESS: 0
MYALGIAS: 1
COUGH: 1
SORE THROAT: 0
FEVER: 0

## 2021-09-12 ASSESSMENT — FIBROSIS 4 INDEX: FIB4 SCORE: 0.4

## 2021-09-12 NOTE — PROGRESS NOTES
Subjective     Katherine CUETO is a 35 y.o. female who presents with Bronchitis (bronchitis and cough,congestion,muscle aches, SOB x 4 days; negative covid test)            HPI   New problem.  Patient is a 35-year-old female who presents with a cough, congestion, body aches, and shortness of breath x4 days.  Per her report she was seen at [facility] urgent care 2 days ago and states that nothing was done for her there.  She did have a negative Covid test as well on Thursday.  She denies fever, vomiting, diarrhea, sore throat, loss of taste and smell.  She has been taking over-the-counter NyQuil for her symptoms with no relief.  Chocolate, Citrus, Latex, Peanut oil, Tomato, Acetaminophen, and Morphine  No current outpatient medications on file prior to visit.     No current facility-administered medications on file prior to visit.     Social History     Socioeconomic History   • Marital status:      Spouse name: Not on file   • Number of children: 1   • Years of education: Not on file   • Highest education level: Bachelor's degree (e.g., BA, AB, BS)   Occupational History   • Not on file   Tobacco Use   • Smoking status: Never Smoker   • Smokeless tobacco: Never Used   Vaping Use   • Vaping Use: Never used   Substance and Sexual Activity   • Alcohol use: Never   • Drug use: Never     Comment:  CBD    • Sexual activity: Yes     Partners: Male   Other Topics Concern   • Not on file   Social History Narrative    ** Merged History Encounter **          Social Determinants of Health     Financial Resource Strain: Low Risk    • Difficulty of Paying Living Expenses: Not hard at all   Food Insecurity: No Food Insecurity   • Worried About Running Out of Food in the Last Year: Never true   • Ran Out of Food in the Last Year: Never true   Transportation Needs: No Transportation Needs   • Lack of Transportation (Medical): No   • Lack of Transportation (Non-Medical): No   Physical Activity: Insufficiently Active  "  • Days of Exercise per Week: 3 days   • Minutes of Exercise per Session: 30 min   Stress: Stress Concern Present   • Feeling of Stress : Very much   Social Connections: Moderately Isolated   • Frequency of Communication with Friends and Family: Once a week   • Frequency of Social Gatherings with Friends and Family: Once a week   • Attends Taoist Services: Never   • Active Member of Clubs or Organizations: Yes   • Attends Club or Organization Meetings: More than 4 times per year   • Marital Status:    Intimate Partner Violence:    • Fear of Current or Ex-Partner:    • Emotionally Abused:    • Physically Abused:    • Sexually Abused:      Breast Cancer-related family history is not on file.      Review of Systems   Constitutional: Negative for chills, fever and malaise/fatigue.   HENT: Positive for congestion. Negative for sore throat.    Respiratory: Positive for cough, sputum production, shortness of breath and wheezing.    Gastrointestinal: Negative for diarrhea and nausea.   Musculoskeletal: Positive for myalgias.   Neurological: Negative for dizziness and headaches.              Objective     /86 (BP Location: Right arm, Patient Position: Sitting)   Pulse 91   Temp 36.2 °C (97.1 °F) (Temporal)   Resp 18   Ht 1.651 m (5' 5\")   Wt 118 kg (260 lb 11.2 oz)   LMP 04/09/2012   SpO2 96%   BMI 43.38 kg/m²      Physical Exam  Vitals and nursing note reviewed.   Constitutional:       General: She is not in acute distress.     Appearance: She is well-developed. She is obese.   HENT:      Head: Normocephalic and atraumatic.      Right Ear: Tympanic membrane, ear canal and external ear normal. No middle ear effusion. Tympanic membrane is not injected or perforated.      Left Ear: Tympanic membrane, ear canal and external ear normal.  No middle ear effusion. Tympanic membrane is not injected or perforated.      Nose: Mucosal edema and congestion present.   Eyes:      General:         Right eye: No " discharge.         Left eye: No discharge.      Conjunctiva/sclera: Conjunctivae normal.   Cardiovascular:      Rate and Rhythm: Normal rate and regular rhythm.      Heart sounds: Normal heart sounds. No murmur heard.     Pulmonary:      Effort: Pulmonary effort is normal. No respiratory distress.      Breath sounds: Wheezing present.   Musculoskeletal:         General: Normal range of motion.      Cervical back: Normal range of motion and neck supple.      Comments: Normal movement of all 4 extremities.   Lymphadenopathy:      Cervical: No cervical adenopathy.      Upper Body:      Right upper body: No supraclavicular adenopathy.      Left upper body: No supraclavicular adenopathy.   Skin:     General: Skin is warm and dry.   Neurological:      Mental Status: She is alert and oriented to person, place, and time.      Gait: Gait normal.   Psychiatric:         Behavior: Behavior normal.         Thought Content: Thought content normal.                             Assessment & Plan        1. Acute bronchitis, unspecified organism  benzonatate (TESSALON) 200 MG capsule    predniSONE (DELTASONE) 20 MG Tab    doxycycline (VIBRAMYCIN) 100 MG Cap    albuterol 108 (90 Base) MCG/ACT Aero Soln inhalation aerosol   2. Cough  CANCELED: COVID/SARS CoV-2 PCR     Differential diagnosis, natural history, supportive care, and indications for immediate follow-up discussed at length.

## 2021-09-30 ENCOUNTER — OFFICE VISIT (OUTPATIENT)
Dept: MEDICAL GROUP | Facility: PHYSICIAN GROUP | Age: 36
End: 2021-09-30
Payer: OTHER GOVERNMENT

## 2021-09-30 VITALS
WEIGHT: 266 LBS | BODY MASS INDEX: 44.32 KG/M2 | OXYGEN SATURATION: 93 % | DIASTOLIC BLOOD PRESSURE: 76 MMHG | SYSTOLIC BLOOD PRESSURE: 138 MMHG | TEMPERATURE: 97.9 F | HEIGHT: 65 IN | RESPIRATION RATE: 16 BRPM | HEART RATE: 99 BPM

## 2021-09-30 DIAGNOSIS — F41.9 ANXIETY: ICD-10-CM

## 2021-09-30 DIAGNOSIS — L70.0 ACNE VULGARIS: ICD-10-CM

## 2021-09-30 PROCEDURE — 99213 OFFICE O/P EST LOW 20 MIN: CPT | Performed by: NURSE PRACTITIONER

## 2021-09-30 RX ORDER — BUPROPION HYDROCHLORIDE 300 MG/1
300 TABLET ORAL EVERY MORNING
COMMUNITY
End: 2021-10-04 | Stop reason: SDUPTHER

## 2021-09-30 RX ORDER — ERYTHROMYCIN AND BENZOYL PEROXIDE 30; 50 MG/G; MG/G
1 GEL TOPICAL 2 TIMES DAILY
Qty: 23.3 G | Refills: 0 | Status: SHIPPED | OUTPATIENT
Start: 2021-09-30 | End: 2022-02-03

## 2021-09-30 ASSESSMENT — FIBROSIS 4 INDEX: FIB4 SCORE: 0.4

## 2021-10-01 PROBLEM — L70.0 ACNE VULGARIS: Status: ACTIVE | Noted: 2021-10-01

## 2021-10-01 PROBLEM — F41.9 ANXIETY: Status: ACTIVE | Noted: 2021-10-01

## 2021-10-01 ASSESSMENT — ENCOUNTER SYMPTOMS
PALPITATIONS: 0
CHILLS: 0
WEIGHT LOSS: 0
DIZZINESS: 0
NERVOUS/ANXIOUS: 1
DEPRESSION: 0
HEADACHES: 0
SHORTNESS OF BREATH: 0
FEVER: 0
COUGH: 0

## 2021-10-01 NOTE — ASSESSMENT & PLAN NOTE
Chronic and exacerbated condition  Has noticed that she had an increase in acne with the mass as well as the stress that she is under  She has been trying gentle rinses and washes with no improvement  She is also decreased the amount make-up that she is using

## 2021-10-01 NOTE — ASSESSMENT & PLAN NOTE
Results for MAYNOR CUETO (MRN [id number]) as of 10/1/2021 12:58   Ref. Range 8/28/2021 07:28   Glucose Latest Ref Range: 65 - 99 mg/dL 104 (H)      Ref. Range 8/28/2021 07:28   Glycohemoglobin Latest Ref Range: 4.0 - 5.6 % 5.4   Estim. Avg Glu Latest Units: mg/dL 108

## 2021-10-01 NOTE — ASSESSMENT & PLAN NOTE
Acute uncomplicated condition.  Patient states that she has had a increase in her stress and anxiety due to some family issues that are happening at home with her step son as well as his biological mom.   She states that some of these issues are bring him back some past childhood trauma and she is struggling with this.  She has noticed that she started to eat whenever she is anxious and is unable to control her food intake  She feels that she is becoming self-destructive  Currently on 300 mg Wellbutrin  Denies any SI HI

## 2021-10-01 NOTE — PROGRESS NOTES
CC:  Chief Complaint   Patient presents with   • Lab Results       HISTORY OF PRESENT ILLNESS: Patient is a 35 y.o. female established patient presenting for follow-up labs, acne.      History of gestational diabetes  Results for MAYNOR CUETO (MRN [id number]) as of 10/1/2021 12:58   Ref. Range 8/28/2021 07:28   Glucose Latest Ref Range: 65 - 99 mg/dL 104 (H)      Ref. Range 8/28/2021 07:28   Glycohemoglobin Latest Ref Range: 4.0 - 5.6 % 5.4   Estim. Avg Glu Latest Units: mg/dL 108       Anxiety  Acute uncomplicated condition.  Patient states that she has had a increase in her stress and anxiety due to some family issues that are happening at home with her step son as well as his biological mom.   She states that some of these issues are bring him back some past childhood trauma and she is struggling with this.  She has noticed that she started to eat whenever she is anxious and is unable to control her food intake  She feels that she is becoming self-destructive  Currently on 300 mg Wellbutrin  Denies any SI HI      Acne vulgaris  Chronic and exacerbated condition  Has noticed that she had an increase in acne with the mass as well as the stress that she is under  She has been trying gentle rinses and washes with no improvement  She is also decreased the amount make-up that she is using      Patient Active Problem List    Diagnosis Date Noted   • Anxiety 10/01/2021   • Acne vulgaris 10/01/2021   • History of partial hysterectomy 08/25/2021   • History of gestational diabetes 08/25/2021   • Morbid obesity with BMI of 45.0-49.9, adult ([facility]) 08/25/2021   • History of COVID-19 08/25/2021      Allergies:Chocolate, Citrus, Latex, Peanut oil, Tomato, Acetaminophen, and Morphine    Current Outpatient Medications   Medication Sig Dispense Refill   • buPROPion (WELLBUTRIN XL) 300 MG XL tablet Take 300 mg by mouth every morning.     • benzoyl peroxide-erythromycin (BENZAMYCIN) gel Apply 1 Application topically 2 times a  "day. 23.3 g 0   • albuterol 108 (90 Base) MCG/ACT Aero Soln inhalation aerosol Inhale 2 Puffs every 6 hours as needed for Shortness of Breath. 8.5 g 1     No current facility-administered medications for this visit.       Social History     Tobacco Use   • Smoking status: Never Smoker   • Smokeless tobacco: Never Used   Vaping Use   • Vaping Use: Never used   Substance Use Topics   • Alcohol use: Never   • Drug use: Never     Comment:  CBD      Social History     Social History Narrative    ** Merged History Encounter **            Family History   Problem Relation Age of Onset   • Lupus Mother    • Heart Disease Mother    • Diabetes Father    • Heart Disease Father    • Diabetes Brother    • Heart Disease Maternal Uncle         MI   • Diabetes Maternal Grandfather    • Parkinson's Disease Paternal Grandmother    • Diabetes Paternal Grandmother    • Cancer Sister         cervical cancer   • Alcohol abuse Paternal Aunt    • Heart Disease Paternal Grandfather         Review of Systems   Constitutional: Negative for chills, fever, malaise/fatigue and weight loss.   Respiratory: Negative for cough and shortness of breath.    Cardiovascular: Negative for chest pain, palpitations and leg swelling.   Skin:        Increase in acne to her chin,  under chin and neck, around hairline to back of neck   Neurological: Negative for dizziness and headaches.   Psychiatric/Behavioral: Negative for depression. The patient is nervous/anxious.              - NOTE: All other systems reviewed and are negative, except as in HPI.      Exam:    /76   Pulse 99   Temp 36.6 °C (97.9 °F) (Temporal)   Resp 16   Ht 1.651 m (5' 5\")   Wt 121 kg (266 lb)   SpO2 93%  Body mass index is 44.26 kg/m².    General: Alert, pleasant, NAD  EYES:   PERRL, EOMI, no icterus or pallor.  Conjunctivae and lids normal.   HENT:  Normocephalic.  External ears normal.   Heart: Regular rate and rhythm.  S1 and S2 normal.  No murmurs " appreciated.  Respiratory: Normal respiratory effort.  Clear to auscultation bilaterally.  Skin: Warm, dry, no rashes.  Mixed open and closed comedones, papules to hairline, back of neck, under chin and anterior neck.  Musculoskeletal: Gait is normal.  Moves all extremities well.    Extremities: No clubbing, cyanosis or edema noted.   Neurological: No tremors, sensation grossly intact, tone/strength normal, gait is normal, CN2-12 intact.  Psych:  Affect/mood is normal, judgement is good, memory is intact, grooming is appropriate.      LABS: 8/28/2021: Results reviewed and discussed with the patient, questions answered.    Assessment/Plan:  1. Anxiety  - REFERRAL TO BEHAVIORAL HEALTH  Extensive conversation with patient regarding her previous trauma as a child and the feelings that she may be having now that are presenting themselves due to the stress that she is having with her stepson.  She is agreeable for behavioral health.    2. Acne vulgaris  - benzoyl peroxide-erythromycin (BENZAMYCIN) gel; Apply 1 Application topically 2 times a day.  Dispense: 23.3 g; Refill: 0  Discussed with patient to start with 1 application once a day if not every other day and see how her skin does and then she can begin to use it daily and then twice daily to help out with her breakouts.  We will reevaluate as needed    Discussed with patient possible alternative diagnoses, patient is to take all medications as prescribed.     If symptoms persist FU w/PCP, if symptoms worsen go to emergency room.     If experiencing any side effects from prescribed medications reports to the office immediately or go to emergency room.    Reviewed indication, dosage, usage and potential adverse effects of prescribed medications.     Reviewed risks and benefits of treatment plan. Patient verbalizes understanding of all instruction and verbally agrees to plan.      Return in about 9 months (around 6/27/2022) for annual physical exam.    My total time  spent caring for the patient on the day of the encounter was 20 minutes.   This does not include time spent on separately billable procedures/tests.     Please note that this dictation was created using voice recognition software. I have made every reasonable attempt to correct obvious errors, but I expect that there are errors of grammar and possibly content that I did not discover before finalizing the note.

## 2021-10-04 NOTE — TELEPHONE ENCOUNTER
Received request via: Patient    Was the patient seen in the last year in this department? Yes    Does the patient have an active prescription (recently filled or refills available) for medication(s) requested? No     Requested Prescriptions     Pending Prescriptions Disp Refills   • buPROPion (WELLBUTRIN XL) 300 MG XL tablet 90 Tablet 0     Sig: Take 1 Tablet by mouth every morning.

## 2021-10-05 ENCOUNTER — TELEMEDICINE (OUTPATIENT)
Dept: MEDICAL GROUP | Facility: PHYSICIAN GROUP | Age: 36
End: 2021-10-05
Payer: OTHER GOVERNMENT

## 2021-10-05 VITALS — HEIGHT: 65 IN | WEIGHT: 255 LBS | BODY MASS INDEX: 42.49 KG/M2

## 2021-10-05 DIAGNOSIS — F41.9 ANXIETY: ICD-10-CM

## 2021-10-05 PROCEDURE — 99213 OFFICE O/P EST LOW 20 MIN: CPT | Mod: 95 | Performed by: NURSE PRACTITIONER

## 2021-10-05 RX ORDER — ALPRAZOLAM 0.5 MG/1
0.5 TABLET ORAL
Qty: 5 TABLET | Refills: 0 | Status: SHIPPED | OUTPATIENT
Start: 2021-10-05 | End: 2021-10-15

## 2021-10-05 RX ORDER — BUPROPION HYDROCHLORIDE 300 MG/1
300 TABLET ORAL EVERY MORNING
Qty: 90 TABLET | Refills: 3 | Status: SHIPPED | OUTPATIENT
Start: 2021-10-05 | End: 2022-11-26 | Stop reason: SDUPTHER

## 2021-10-05 ASSESSMENT — ENCOUNTER SYMPTOMS
WEIGHT LOSS: 0
PALPITATIONS: 0
SHORTNESS OF BREATH: 0
MEMORY LOSS: 1
NERVOUS/ANXIOUS: 1
FEVER: 0
INSOMNIA: 1
HEADACHES: 1
CHILLS: 0

## 2021-10-05 ASSESSMENT — PATIENT HEALTH QUESTIONNAIRE - PHQ9: CLINICAL INTERPRETATION OF PHQ2 SCORE: 0

## 2021-10-05 ASSESSMENT — FIBROSIS 4 INDEX: FIB4 SCORE: 0.4

## 2021-10-06 NOTE — PROGRESS NOTES
Virtual Visit: Established Patient   This visit was conducted via Zoom using secure and encrypted videoconferencing technology.   The patient was in a private location in the state of [place].    The patient's identity was confirmed and verbal consent was obtained for this virtual visit.    Subjective:   CC:   Chief Complaint   Patient presents with   • Other     ADHD meds       Crystal Carly CUETO is a 35 y.o. female presenting for evaluation and management of:    Anxiety  States her stress level is extremely high now.   States she is dealing with legal issues with her family   Needs to go to court next week   Requesting something for anxiety  Requesting possible look into diagnosis of ADHD      ROS   Review of Systems   Constitutional: Negative for chills, fever and weight loss.   Respiratory: Negative for shortness of breath.    Cardiovascular: Negative for chest pain and palpitations.   Neurological: Positive for headaches.   Psychiatric/Behavioral: Positive for memory loss. The patient is nervous/anxious and has insomnia.         Extreme stress       Current medicines (including changes today)  Current Outpatient Medications   Medication Sig Dispense Refill   • ALPRAZolam (XANAX) 0.5 MG Tab Take 1 Tablet by mouth 1 time a day as needed for Anxiety for up to 10 days. 5 Tablet 0   • benzoyl peroxide-erythromycin (BENZAMYCIN) gel Apply 1 Application topically 2 times a day. 23.3 g 0   • albuterol 108 (90 Base) MCG/ACT Aero Soln inhalation aerosol Inhale 2 Puffs every 6 hours as needed for Shortness of Breath. 8.5 g 1   • buPROPion (WELLBUTRIN XL) 300 MG XL tablet Take 1 Tablet by mouth every morning. 90 Tablet 3     No current facility-administered medications for this visit.       Patient Active Problem List    Diagnosis Date Noted   • Anxiety 10/01/2021   • Acne vulgaris 10/01/2021   • History of partial hysterectomy 08/25/2021   • History of gestational diabetes 08/25/2021   • Morbid obesity with BMI of  "45.0-49.9, adult ([facility]) 08/25/2021   • History of COVID-19 08/25/2021        Objective:   Ht 1.651 m (5' 5\")   Wt 116 kg (255 lb)   LMP 04/09/2012   BMI 42.43 kg/m²     Physical Exam:  Constitutional: Alert, no distress, well-groomed.  Skin: No rashes in visible areas.  Eye: Round. Conjunctiva clear, lids normal. No icterus.   ENMT: Lips pink without lesions, good dentition, moist mucous membranes. Phonation normal.  Neck: No masses, no thyromegaly. Moves freely without pain.  Respiratory: Unlabored respiratory effort, no cough or audible wheeze  Psych: Alert and oriented x3, normal affect and mood.     Assessment and Plan:   The following treatment plan was discussed:     1. Anxiety  - ALPRAZolam (XANAX) 0.5 MG Tab; Take 1 Tablet by mouth 1 time a day as needed for Anxiety for up to 10 days.  Dispense: 5 Tablet; Refill: 0  plans to follow up with behavioral health soon  Has court date next week  PDMP reviewed  If pt needs more she will need to have a in person appt    Follow-up: No follow-ups on file.           "

## 2021-10-06 NOTE — ASSESSMENT & PLAN NOTE
States her stress level is extremely high now.   States she is dealing with legal issues with her family   Needs to go to court next week   Requesting something for anxiety  Requesting possible look into diagnosis of ADHD

## 2021-12-16 ENCOUNTER — PATIENT MESSAGE (OUTPATIENT)
Dept: MEDICAL GROUP | Facility: PHYSICIAN GROUP | Age: 36
End: 2021-12-16

## 2021-12-16 DIAGNOSIS — F41.9 ANXIETY: ICD-10-CM

## 2021-12-16 RX ORDER — ESCITALOPRAM OXALATE 10 MG/1
10 TABLET ORAL DAILY
COMMUNITY
End: 2021-12-16 | Stop reason: SDUPTHER

## 2021-12-16 RX ORDER — ESCITALOPRAM OXALATE 10 MG/1
10 TABLET ORAL DAILY
Qty: 30 TABLET | Refills: 0 | Status: SHIPPED | OUTPATIENT
Start: 2021-12-16 | End: 2022-02-03

## 2021-12-23 ENCOUNTER — OFFICE VISIT (OUTPATIENT)
Dept: URGENT CARE | Facility: CLINIC | Age: 36
End: 2021-12-23
Payer: OTHER GOVERNMENT

## 2021-12-23 VITALS
DIASTOLIC BLOOD PRESSURE: 76 MMHG | BODY MASS INDEX: 45.6 KG/M2 | SYSTOLIC BLOOD PRESSURE: 132 MMHG | OXYGEN SATURATION: 98 % | WEIGHT: 273.7 LBS | HEART RATE: 89 BPM | TEMPERATURE: 98.1 F | RESPIRATION RATE: 18 BRPM | HEIGHT: 65 IN

## 2021-12-23 DIAGNOSIS — L73.9 FOLLICULITIS: ICD-10-CM

## 2021-12-23 DIAGNOSIS — L02.91 ABSCESS: ICD-10-CM

## 2021-12-23 PROCEDURE — 99213 OFFICE O/P EST LOW 20 MIN: CPT | Mod: 25 | Performed by: NURSE PRACTITIONER

## 2021-12-23 PROCEDURE — 10060 I&D ABSCESS SIMPLE/SINGLE: CPT | Performed by: NURSE PRACTITIONER

## 2021-12-23 RX ORDER — DOXYCYCLINE HYCLATE 100 MG
100 TABLET ORAL 2 TIMES DAILY
Qty: 20 TABLET | Refills: 0 | Status: SHIPPED | OUTPATIENT
Start: 2021-12-23 | End: 2022-01-02

## 2021-12-23 RX ORDER — ESCITALOPRAM OXALATE 10 MG/1
10 TABLET ORAL EVERY MORNING
COMMUNITY
End: 2022-02-03

## 2021-12-23 ASSESSMENT — FIBROSIS 4 INDEX: FIB4 SCORE: 0.4

## 2021-12-24 NOTE — PROGRESS NOTES
Subjective:     Katherine CUETO is a 35 y.o. female who presents for Bump ((L) chelo  area x this morning; redness, warm, bleeding, achey pain)      Has two bumps in her left groin area, with some bleeding. Has concerns for a blood clot, as her mother has a history. No leg numbness or swelling.        Groin Pain  This is a new problem. The current episode started today. The problem occurs constantly. The problem has been gradually worsening. Pertinent negatives include no fever. The symptoms are aggravated by walking. She has tried nothing for the symptoms.       Past Medical History:   Diagnosis Date   • ASTHMA     medications not needed   • FHx: cleft palate 2010   • FHx: cleft palate 2010    Mother, brother, uncle (maternal), and aunt (maternal) with cleft palate    • GBS (group B Streptococcus carrier), +RV culture, currently pregnant 2010   • History of  2010   • History of macrosomia in infant in prior pregnancy, currently pregnant 2010   • History of macrosomia in infant in prior pregnancy, currently pregnant 2010    9lb5oz    • Hypoglycemia     at 16 years old   • Previous  section- desires Repeat 2010   • SUPERVISION OF HIGH-RISK PREGNANCY 2010   • SUPERVISION OF HIGH-RISK PREGNANCY 2010       Past Surgical History:   Procedure Laterality Date   • HYSTERECTOMY LAPAROSCOPY  2021    still has ovaries   • CHOLECYSTECTOMY  10/2020   • REPEAT C SECTION W TUBAL LIGATION  2010    Performed by JF DOHERTY at [facility]   • PRIMARY C SECTION     • TONSILLECTOMY  at age 5       Social History     Socioeconomic History   • Marital status:      Spouse name: Not on file   • Number of children: 1   • Years of education: Not on file   • Highest education level: Bachelor's degree (e.g., BA, AB, BS)   Occupational History   • Not on file   Tobacco Use   • Smoking status: Never Smoker   • Smokeless tobacco: Never Used    Vaping Use   • Vaping Use: Never used   Substance and Sexual Activity   • Alcohol use: Never   • Drug use: Never     Comment:  CBD    • Sexual activity: Yes     Partners: Male   Other Topics Concern   • Not on file   Social History Narrative    ** Merged History Encounter **          Social Determinants of Health     Financial Resource Strain: Low Risk    • Difficulty of Paying Living Expenses: Not hard at all   Food Insecurity: No Food Insecurity   • Worried About Running Out of Food in the Last Year: Never true   • Ran Out of Food in the Last Year: Never true   Transportation Needs: No Transportation Needs   • Lack of Transportation (Medical): No   • Lack of Transportation (Non-Medical): No   Physical Activity: Insufficiently Active   • Days of Exercise per Week: 3 days   • Minutes of Exercise per Session: 30 min   Stress: Stress Concern Present   • Feeling of Stress : Very much   Social Connections: Moderately Isolated   • Frequency of Communication with Friends and Family: Once a week   • Frequency of Social Gatherings with Friends and Family: Once a week   • Attends Rastafari Services: Never   • Active Member of Clubs or Organizations: Yes   • Attends Club or Organization Meetings: More than 4 times per year   • Marital Status:    Intimate Partner Violence:    • Fear of Current or Ex-Partner: Not on file   • Emotionally Abused: Not on file   • Physically Abused: Not on file   • Sexually Abused: Not on file   Housing Stability: Low Risk    • Unable to Pay for Housing in the Last Year: No   • Number of Places Lived in the Last Year: 1   • Unstable Housing in the Last Year: No        Family History   Problem Relation Age of Onset   • Lupus Mother    • Heart Disease Mother    • Diabetes Father    • Heart Disease Father    • Diabetes Brother    • Heart Disease Maternal Uncle         MI   • Diabetes Maternal Grandfather    • Parkinson's Disease Paternal Grandmother    • Diabetes Paternal Grandmother    •  "Cancer Sister         cervical cancer   • Alcohol abuse Paternal Aunt    • Heart Disease Paternal Grandfather         Allergies   Allergen Reactions   • Chocolate Diarrhea, Hives and Itching     tongue itching  tongue itching     • Citrus Nausea and Hives   • Latex Hives and Unspecified     Skin burns.  burning  burning   • Peanut Oil Unspecified     itching  itching   • Tomato Itching     Itchy tongue  Itchy tongue   • Milk Protein Extract      Other reaction(s): .Unknown   • Acetaminophen Nausea     \"Upset stomach when taken on empty stomach\"   • Morphine Vomiting       Review of Systems   Constitutional: Negative for fever.   Cardiovascular: Negative for leg swelling.   Neurological: Negative for sensory change.   All other systems reviewed and are negative.       Objective:   /76 (BP Location: Right arm, Patient Position: Sitting)   Pulse 89   Temp 36.7 °C (98.1 °F) (Temporal)   Resp 18   Ht 1.651 m (5' 5\")   Wt 124 kg (273 lb 11.2 oz)   LMP 04/09/2012   SpO2 98%   BMI 45.55 kg/m²     Physical Exam  Vitals reviewed.   Constitutional:       General: She is not in acute distress.  Pulmonary:      Effort: No respiratory distress.   Skin:     General: Skin is warm and dry.      Findings: Abscess present.      Comments: Left groion: 1.5 cm abscess with cental fluctuance and ingrown hair. Smaller pustules in area, and hyperpigmentation of skin from previous lesions. Firm mass medial to abscess, no fluctuance, area consistent with inguinal lymphnode.   Neurological:      General: No focal deficit present.      Mental Status: She is alert and oriented to person, place, and time.   Psychiatric:         Mood and Affect: Mood normal.         Behavior: Behavior normal.         Assessment/Plan:   1. Abscess  - doxycycline (VIBRAMYCIN) 100 MG Tab; Take 1 Tablet by mouth 2 times a day for 10 days.  Dispense: 20 Tablet; Refill: 0    2. Folliculitis  - doxycycline (VIBRAMYCIN) 100 MG Tab; Take 1 Tablet by mouth 2 " times a day for 10 days.  Dispense: 20 Tablet; Refill: 0  - mupirocin (BACTROBAN) 2 % Ointment; Apply 1 Application topically 3 times a day for 7 days.  Dispense: 15 g; Refill: 0    -Daily dressing changes. Clean with soap and water. Monitor for signs of infection. Can shower.  -Warm compress.  -OTC Tylenol for pain.     Procedure: Incision and Drainage  -Risks, benefits, and alternatives discussed. Risks include infection, bleeding, nerve damage, and poor cosmetic outcome  -Clean technique with sterile instruments  -Local anesthesia with 2% lidocaine with epinephrine  -Incision with #11 blade into fluctuant area with purulent material expressed  -Cavity probed and any loculations bluntly taken down with hemostat  -Irrigated copiously with sterile saline  -Minimal bleeding with good hemostasis achieved  -The patient tolerated the procedure well    The patient is alerted to watch for any signs of infection (redness, red streaking, accumulation of pus, pain, increased swelling, chills or fever) and follow up if such occurs. Discussed oral antibiotics with multiple lesions, and folliculitis. Follow up for persistent lymphadenopathy or swelling. Advised to apply warm compress in area of firm mass, differential to include lymphadenopathy vs a secondary abscess. Follow up for development of abscess or fluctuance. Contingent topical antibiotic for future lesions. Educated patient on S&S of DVT to alleviate concern.     Differential diagnosis, natural history, supportive care, and indications for immediate follow-up discussed.

## 2021-12-24 NOTE — PATIENT INSTRUCTIONS
Folliculitis    Folliculitis is inflammation of the hair follicles. Folliculitis most commonly occurs on the scalp, thighs, legs, back, and buttocks. However, it can occur anywhere on the body.  What are the causes?  This condition may be caused by:  · A bacterial infection (common).  · A fungal infection.  · A viral infection.  · Contact with certain chemicals, especially oils and tars.  · Shaving or waxing.  · Greasy ointments or creams applied to the skin.  Long-lasting folliculitis and folliculitis that keeps coming back may be caused by bacteria. This bacteria can live anywhere on your skin and is often found in the nostrils.  What increases the risk?  You are more likely to develop this condition if you have:  · A weakened immune system.  · Diabetes.  · Obesity.  What are the signs or symptoms?  Symptoms of this condition include:  · Redness.  · Soreness.  · Swelling.  · Itching.  · Small white or yellow, pus-filled, itchy spots (pustules) that appear over a reddened area. If there is an infection that goes deep into the follicle, these may develop into a boil (furuncle).  · A group of closely packed boils (carbuncle). These tend to form in hairy, sweaty areas of the body.  How is this diagnosed?  This condition is diagnosed with a skin exam. To find what is causing the condition, your health care provider may take a sample of one of the pustules or boils for testing in a lab.  How is this treated?  This condition may be treated by:  · Applying warm compresses to the affected areas.  · Taking an antibiotic medicine or applying an antibiotic medicine to the skin.  · Applying or bathing with an antiseptic solution.  · Taking an over-the-counter medicine to help with itching.  · Having a procedure to drain any pustules or boils. This may be done if a pustule or boil contains a lot of pus or fluid.  · Having laser hair removal. This may be done to treat long-lasting folliculitis.  Follow these instructions at  home:  Managing pain and swelling    · If directed, apply heat to the affected area as often as told by your health care provider. Use the heat source that your health care provider recommends, such as a moist heat pack or a heating pad.  ? Place a towel between your skin and the heat source.  ? Leave the heat on for 20-30 minutes.  ? Remove the heat if your skin turns bright red. This is especially important if you are unable to feel pain, heat, or cold. You may have a greater risk of getting burned.  General instructions  · If you were prescribed an antibiotic medicine, take it or apply it as told by your health care provider. Do not stop using the antibiotic even if your condition improves.  · Check the irritated area every day for signs of infection. Check for:  ? Redness, swelling, or pain.  ? Fluid or blood.  ? Warmth.  ? Pus or a bad smell.  · Do not shave irritated skin.  · Take over-the-counter and prescription medicines only as told by your health care provider.  · Keep all follow-up visits as told by your health care provider. This is important.  Get help right away if:  · You have more redness, swelling, or pain in the affected area.  · Red streaks are spreading from the affected area.  · You have a fever.  Summary  · Folliculitis is inflammation of the hair follicles. Folliculitis most commonly occurs on the scalp, thighs, legs, back, and buttocks.  · This condition may be treated by taking an antibiotic medicine or applying an antibiotic medicine to the skin, and applying or bathing with an antiseptic solution.  · If you were prescribed an antibiotic medicine, take it or apply it as told by your health care provider. Do not stop using the antibiotic even if your condition improves.  · Get help right away if you have new or worsening symptoms.  · Keep all follow-up visits as told by your health care provider. This is important.  This information is not intended to replace advice given to you by your  health care provider. Make sure you discuss any questions you have with your health care provider.  Document Released: 02/26/2003 Document Revised: 07/27/2019 Document Reviewed: 07/27/2019  [employer] Patient Education © 2020 [employer]    Abscess  Care After  An abscess (also called a boil or furuncle) is an infected area that contains a collection of pus. Signs and symptoms of an abscess include pain, tenderness, redness, or hardness, or you may feel a moveable soft area under your skin. An abscess can occur anywhere in the body. The infection may spread to surrounding tissues causing cellulitis. A cut (incision) by the surgeon was made over your abscess and the pus was drained out. Gauze may have been packed into the space to provide a drain that will allow the cavity to heal from the inside outwards. The boil may be painful for 5 to 7 days. Most people with a boil do not have high fevers. Your abscess, if seen early, may not have localized, and may not have been lanced. If not, another appointment may be required for this if it does not get better on its own or with medications.  HOME CARE INSTRUCTIONS   · Only take over-the-counter or prescription medicines for pain, discomfort, or fever as directed by your caregiver.  · When you bathe, soak and then remove gauze or iodoform packs at least daily or as directed by your caregiver. You may then wash the wound gently with mild soapy water. Repack with gauze or do as your caregiver directs.  SEEK IMMEDIATE MEDICAL CARE IF:   · You develop increased pain, swelling, redness, drainage, or bleeding in the wound site.  · You develop signs of generalized infection including muscle aches, chills, fever, or a general ill feeling.  · An oral temperature above 102° F (38.9° C) develops, not controlled by medication.  See your caregiver for a recheck if you develop any of the symptoms described above. If medications (antibiotics) were prescribed, take them as  directed.  Document Released: 07/06/2006 Document Revised: 03/11/2013 Document Reviewed: 03/02/2009  [employer]® Patient Information ©2014 [employer].      Lymphadenopathy    Lymphadenopathy means that your lymph glands are swollen or larger than normal (enlarged). Lymph glands, also called lymph nodes, are collections of tissue that filter bacteria, viruses, and waste from your bloodstream. They are part of your body's disease-fighting system (immune system), which protects your body from germs.  There may be different causes of lymphadenopathy, depending on where it is in your body. Some types go away on their own. Lymphadenopathy can occur anywhere that you have lymph glands, including these areas:  · Neck (cervical lymphadenopathy).  · Chest (mediastinal lymphadenopathy).  · Lungs (hilar lymphadenopathy).  · Underarms (axillary lymphadenopathy).  · Groin (inguinal lymphadenopathy).  When your immune system responds to germs, infection-fighting cells and fluid build up in your lymph glands. This causes some swelling and enlargement. If the lymph glands do not go back to normal after you have an infection or disease, your health care provider may do tests. These tests help to monitor your condition and find the reason why the glands are still swollen and enlarged.  Follow these instructions at home:  · Get plenty of rest.  · Take over-the-counter and prescription medicines only as told by your health care provider. Your health care provider may recommend over-the-counter medicines for pain.  · If directed, apply heat to swollen lymph glands as often as told by your health care provider. Use the heat source that your health care provider recommends, such as a moist heat pack or a heating pad.  ? Place a towel between your skin and the heat source.  ? Leave the heat on for 20-30 minutes.  ? Remove the heat if your skin turns bright red. This is especially important if you are unable to feel pain, heat, or cold. You  may have a greater risk of getting burned.  · Check your affected lymph glands every day for changes. Check other lymph gland areas as told by your health care provider. Check for changes such as:  ? More swelling.  ? Sudden increase in size.  ? Redness or pain.  ? Hardness.  · Keep all follow-up visits as told by your health care provider. This is important.  Contact a health care provider if you have:  · Swelling that gets worse or spreads to other areas.  · Problems with breathing.  · Lymph glands that:  ? Are still swollen after 2 weeks.  ? Have suddenly gotten bigger.  ? Are red, painful, or hard.  · A fever or chills.  · Fatigue.  · A sore throat.  · Pain in your abdomen.  · Weight loss.  · Night sweats.  Get help right away if you have:  · Fluid leaking from an enlarged lymph gland.  · Severe pain.  · Chest pain.  · Shortness of breath.  Summary  · Lymphadenopathy means that your lymph glands are swollen or larger than normal (enlarged).  · Lymph glands (also called lymph nodes) are collections of tissue that filter bacteria, viruses, and waste from the bloodstream. They are part of your body's disease-fighting system (immune system).  · Lymphadenopathy can occur anywhere that you have lymph glands.  · If your enlarged and swollen lymph glands do not go back to normal after you have an infection or disease, your health care provider may do tests to monitor your condition and find the reason why the glands are still swollen and enlarged.  · Check your affected lymph glands every day for changes. Check other lymph gland areas as told by your health care provider.  This information is not intended to replace advice given to you by your health care provider. Make sure you discuss any questions you have with your health care provider.  Document Released: 09/26/2009 Document Revised: 11/30/2018 Document Reviewed: 11/02/2018  [employer] Patient Education © 2020 [employer]

## 2022-01-03 ASSESSMENT — ENCOUNTER SYMPTOMS
FEVER: 0
SENSORY CHANGE: 0

## 2022-02-03 ENCOUNTER — OFFICE VISIT (OUTPATIENT)
Dept: MEDICAL GROUP | Facility: PHYSICIAN GROUP | Age: 37
End: 2022-02-03
Payer: OTHER GOVERNMENT

## 2022-02-03 VITALS
HEART RATE: 86 BPM | HEIGHT: 66 IN | DIASTOLIC BLOOD PRESSURE: 74 MMHG | BODY MASS INDEX: 44.32 KG/M2 | RESPIRATION RATE: 18 BRPM | SYSTOLIC BLOOD PRESSURE: 128 MMHG | WEIGHT: 275.8 LBS | TEMPERATURE: 97.8 F | OXYGEN SATURATION: 97 %

## 2022-02-03 DIAGNOSIS — F43.12 CHRONIC POST-TRAUMATIC STRESS DISORDER (PTSD): ICD-10-CM

## 2022-02-03 DIAGNOSIS — Z86.16 HISTORY OF COVID-19: ICD-10-CM

## 2022-02-03 DIAGNOSIS — R09.81 SINUS CONGESTION: ICD-10-CM

## 2022-02-03 DIAGNOSIS — J01.10 ACUTE NON-RECURRENT FRONTAL SINUSITIS: ICD-10-CM

## 2022-02-03 PROBLEM — F33.0 MILD EPISODE OF RECURRENT MAJOR DEPRESSIVE DISORDER (HCC): Status: ACTIVE | Noted: 2022-01-11

## 2022-02-03 PROCEDURE — 99214 OFFICE O/P EST MOD 30 MIN: CPT | Performed by: NURSE PRACTITIONER

## 2022-02-03 RX ORDER — PRAZOSIN HYDROCHLORIDE 1 MG/1
1 CAPSULE ORAL NIGHTLY
COMMUNITY
Start: 2022-01-11 | End: 2022-06-03

## 2022-02-03 RX ORDER — AMOXICILLIN AND CLAVULANATE POTASSIUM 875; 125 MG/1; MG/1
1 TABLET, FILM COATED ORAL 2 TIMES DAILY
Qty: 10 TABLET | Refills: 0 | Status: SHIPPED | OUTPATIENT
Start: 2022-02-03 | End: 2022-02-18

## 2022-02-03 RX ORDER — AZELASTINE 1 MG/ML
1 SPRAY, METERED NASAL 2 TIMES DAILY
Qty: 30 ML | Refills: 0 | Status: SHIPPED
Start: 2022-02-03 | End: 2022-03-16

## 2022-02-03 ASSESSMENT — ENCOUNTER SYMPTOMS
SORE THROAT: 0
HEADACHES: 0
FEVER: 0
SINUS PAIN: 1
SHORTNESS OF BREATH: 1
COUGH: 0
WHEEZING: 0
CHILLS: 0
DIZZINESS: 0

## 2022-02-03 ASSESSMENT — FIBROSIS 4 INDEX: FIB4 SCORE: 0.41

## 2022-02-03 ASSESSMENT — PATIENT HEALTH QUESTIONNAIRE - PHQ9: CLINICAL INTERPRETATION OF PHQ2 SCORE: 0

## 2022-02-04 NOTE — PROGRESS NOTES
CC:  Chief Complaint   Patient presents with   • Otalgia   • Other     sinus pressure        HISTORY OF PRESENT ILLNESS: Patient is a 36 y.o. female established patient presenting sinus congestion for 1 month      Chronic post-traumatic stress disorder (PTSD)  Prazosin, sertraline and wellbutrin    Sinus congestion  After jeremy COVID again injanuary  taking OTC allergy medication   Feels like a burning sensation in sinuses  Left ear pain and throbbing, change in hearing on left side  Denies chills, fevers,       Patient Active Problem List    Diagnosis Date Noted   • Sinus congestion 02/03/2022   • Chronic post-traumatic stress disorder (PTSD) 01/11/2022   • Mild episode of recurrent major depressive disorder (HCC) 01/11/2022   • Anxiety 10/01/2021   • Acne vulgaris 10/01/2021   • History of partial hysterectomy 08/25/2021   • History of gestational diabetes 08/25/2021   • Morbid obesity with BMI of 45.0-49.9, adult ([facility]) 08/25/2021   • History of COVID-19 08/25/2021      Allergies:Chocolate, Citrus, Latex, Peanut oil, Tomato, Milk protein extract, Acetaminophen, and Morphine    Current Outpatient Medications   Medication Sig Dispense Refill   • prazosin (MINIPRESS) 1 MG Cap Take 1 mg by mouth.     • sertraline (ZOLOFT) 50 MG Tab Take 1/2 tablet daily for 1 week, then 1 tablet daily thereafter Indications: Major Depressive Disorder, Posttraumatic Stress Disorder     • cyanocobalamin (VITAMIN B-12) 100 MCG Tab Take 100 mcg by mouth every day.     • amoxicillin-clavulanate (AUGMENTIN) 875-125 MG Tab Take 1 Tablet by mouth 2 times a day. 10 Tablet 0   • azelastine (ASTELIN) 137 MCG/SPRAY nasal spray Administer 1 Spray into affected nostril(S) 2 times a day. 30 mL 0   • buPROPion (WELLBUTRIN XL) 300 MG XL tablet Take 1 Tablet by mouth every morning. 90 Tablet 3   • albuterol 108 (90 Base) MCG/ACT Aero Soln inhalation aerosol Inhale 2 Puffs every 6 hours as needed for Shortness of Breath. 8.5 g 1     No current  "facility-administered medications for this visit.       Social History     Tobacco Use   • Smoking status: Never Smoker   • Smokeless tobacco: Never Used   Vaping Use   • Vaping Use: Never used   Substance Use Topics   • Alcohol use: Never   • Drug use: Never     Comment:  CBD      Social History     Social History Narrative    ** Merged History Encounter **            Family History   Problem Relation Age of Onset   • Lupus Mother    • Heart Disease Mother    • Diabetes Father    • Heart Disease Father    • Diabetes Brother    • Heart Disease Maternal Uncle         MI   • Diabetes Maternal Grandfather    • Parkinson's Disease Paternal Grandmother    • Diabetes Paternal Grandmother    • Cancer Sister         cervical cancer   • Alcohol abuse Paternal Aunt    • Heart Disease Paternal Grandfather         Review of Systems   Constitutional: Positive for malaise/fatigue. Negative for chills and fever.   HENT: Positive for congestion, ear pain, hearing loss and sinus pain. Negative for sore throat.    Respiratory: Positive for shortness of breath. Negative for cough and wheezing.    Cardiovascular: Negative for chest pain.   Neurological: Negative for dizziness and headaches.             - NOTE: All other systems reviewed and are negative, except as in HPI.      Exam:    /74   Pulse 86   Temp 36.6 °C (97.8 °F) (Temporal)   Resp 18   Ht 1.676 m (5' 6\")   Wt (!) 125 kg (275 lb 12.8 oz)   SpO2 97%  Body mass index is 44.52 kg/m².    General: Alert, pleasant, NAD  EYES:   PERRL, EOMI, no icterus or pallor.  Conjunctivae and lids normal.   HENT:  Normocephalic.  External ears normal. Tympanic bulging let side.  Nasal drainage present.  Oropharynx non-erythematous, mucous membranes moist.  Neck supple.   No thyromegaly or masses palpated. No cervical or supraclavicular lymphadenopathy.  Heart: Regular rate and rhythm.  S1 and S2 normal.  No murmurs appreciated.  Respiratory: Normal respiratory effort.  Clear to " auscultation bilaterally.  Skin: Warm, dry, no rashes.  Musculoskeletal: Gait is normal.  Moves all extremities well.    Extremities: No clubbing, cyanosis or edema noted.   Neurological: No tremors, sensation grossly intact, tone/strength normal, gait is normal, CN2-12 intact.  Psych:  Affect/mood is normal, judgement is good, memory is intact, grooming is appropriate.    Assessment/Plan:  1. History of COVID-19    2. Chronic post-traumatic stress disorder (PTSD)  Continue with therapy  Continue with medication regimen    3. Sinus congestion  - azelastine (ASTELIN) 137 MCG/SPRAY nasal spray; Administer 1 Spray into affected nostril(S) 2 times a day.  Dispense: 30 mL; Refill: 0    4. Acute non-recurrent frontal sinusitis  - amoxicillin-clavulanate (AUGMENTIN) 875-125 MG Tab; Take 1 Tablet by mouth 2 times a day.  Dispense: 10 Tablet; Refill: 0       Discussed with patient possible alternative diagnoses, patient is to take all medications as prescribed.     If symptoms persist FU w/PCP, if symptoms worsen go to emergency room.     If experiencing any side effects from prescribed medications reports to the office immediately or go to emergency room.    Reviewed indication, dosage, usage and potential adverse effects of prescribed medications.     Reviewed risks and benefits of treatment plan. Patient verbalizes understanding of all instruction and verbally agrees to plan.      Return for appointment in october for annual physical exam.    My total time spent caring for the patient on the day of the encounter was 25 minutes.   This does not include time spent on separately billable procedures/tests.     Please note that this dictation was created using voice recognition software. I have made every reasonable attempt to correct obvious errors, but I expect that there are errors of grammar and possibly content that I did not discover before finalizing the note.

## 2022-02-04 NOTE — ASSESSMENT & PLAN NOTE
After jeremy COVID again injanuary  taking OTC allergy medication   Feels like a burning sensation in sinuses  Left ear pain and throbbing, change in hearing on left side  Denies chills, fevers,

## 2022-02-18 ENCOUNTER — OFFICE VISIT (OUTPATIENT)
Dept: MEDICAL GROUP | Facility: PHYSICIAN GROUP | Age: 37
End: 2022-02-18
Payer: OTHER GOVERNMENT

## 2022-02-18 VITALS
OXYGEN SATURATION: 100 % | RESPIRATION RATE: 18 BRPM | WEIGHT: 279 LBS | TEMPERATURE: 97.4 F | DIASTOLIC BLOOD PRESSURE: 78 MMHG | SYSTOLIC BLOOD PRESSURE: 128 MMHG | BODY MASS INDEX: 44.84 KG/M2 | HEART RATE: 86 BPM | HEIGHT: 66 IN

## 2022-02-18 DIAGNOSIS — H92.02 EAR PAIN, LEFT: ICD-10-CM

## 2022-02-18 DIAGNOSIS — R09.81 SINUS CONGESTION: ICD-10-CM

## 2022-02-18 PROBLEM — Z86.16 HISTORY OF COVID-19: Status: RESOLVED | Noted: 2021-08-25 | Resolved: 2022-02-18

## 2022-02-18 PROCEDURE — 99214 OFFICE O/P EST MOD 30 MIN: CPT | Performed by: NURSE PRACTITIONER

## 2022-02-18 RX ORDER — LORATADINE 10 MG/1
10 TABLET ORAL DAILY
COMMUNITY
End: 2022-03-16

## 2022-02-18 RX ORDER — PSEUDOEPHEDRINE HCL 30 MG
30 TABLET ORAL EVERY 6 HOURS PRN
Qty: 20 TABLET | Refills: 0 | Status: SHIPPED
Start: 2022-02-18 | End: 2022-03-22

## 2022-02-18 ASSESSMENT — ENCOUNTER SYMPTOMS
HEADACHES: 0
DIZZINESS: 0
PALPITATIONS: 0
STRIDOR: 0
SHORTNESS OF BREATH: 0
EYES NEGATIVE: 1
WHEEZING: 0
SINUS PAIN: 0
COUGH: 0
SORE THROAT: 0
WEIGHT LOSS: 0
FEVER: 0
CHILLS: 0

## 2022-02-18 ASSESSMENT — PATIENT HEALTH QUESTIONNAIRE - PHQ9
1. LITTLE INTEREST OR PLEASURE IN DOING THINGS: NOT AT ALL
4. FEELING TIRED OR HAVING LITTLE ENERGY: NOT AT ALL
5. POOR APPETITE OR OVEREATING: NOT AT ALL
SUM OF ALL RESPONSES TO PHQ9 QUESTIONS 1 AND 2: 0
9. THOUGHTS THAT YOU WOULD BE BETTER OFF DEAD, OR OF HURTING YOURSELF: NOT AT ALL
SUM OF ALL RESPONSES TO PHQ QUESTIONS 1-9: 0
2. FEELING DOWN, DEPRESSED, IRRITABLE, OR HOPELESS: NOT AT ALL
3. TROUBLE FALLING OR STAYING ASLEEP OR SLEEPING TOO MUCH: NOT AT ALL
7. TROUBLE CONCENTRATING ON THINGS, SUCH AS READING THE NEWSPAPER OR WATCHING TELEVISION: NOT AT ALL
8. MOVING OR SPEAKING SO SLOWLY THAT OTHER PEOPLE COULD HAVE NOTICED. OR THE OPPOSITE, BEING SO FIGETY OR RESTLESS THAT YOU HAVE BEEN MOVING AROUND A LOT MORE THAN USUAL: NOT AT ALL
6. FEELING BAD ABOUT YOURSELF - OR THAT YOU ARE A FAILURE OR HAVE LET YOURSELF OR YOUR FAMILY DOWN: NOT AL ALL

## 2022-02-18 ASSESSMENT — FIBROSIS 4 INDEX: FIB4 SCORE: 0.41

## 2022-02-19 NOTE — ASSESSMENT & PLAN NOTE
Feels like it is burning   C/o sneezing a lot  Fatigue  taking Claritin denies change  Taking benadryl at night- denies change  Saline spray ans Astelin spray with no change.

## 2022-02-19 NOTE — PROGRESS NOTES
CC:  Chief Complaint   Patient presents with   • Nasal Congestion     fv       HISTORY OF PRESENT ILLNESS: Patient is a 36 y.o. female established patient presenting follow up ear burning and congestion      Ear pain, left  Feels like it is burning   C/o sneezing a lot  Fatigue  taking Claritin denies change  Taking benadryl at night- denies change  Saline spray ans Astelin spray with no change.       Patient Active Problem List    Diagnosis Date Noted   • Ear pain, left 02/18/2022   • Sinus congestion 02/03/2022   • Chronic post-traumatic stress disorder (PTSD) 01/11/2022   • Mild episode of recurrent major depressive disorder (HCC) 01/11/2022   • Anxiety 10/01/2021   • Acne vulgaris 10/01/2021   • History of partial hysterectomy 08/25/2021   • History of gestational diabetes 08/25/2021   • Morbid obesity with BMI of 45.0-49.9, adult ([facility]) 08/25/2021      Allergies:Chocolate, Citrus, Latex, Peanut oil, Tomato, Milk protein extract, Acetaminophen, and Morphine    Current Outpatient Medications   Medication Sig Dispense Refill   • loratadine (CLARITIN) 10 MG Tab Take 10 mg by mouth every day.     • pseudoephedrine (SUDAFED) 30 MG Tab Take 1 Tablet by mouth every 6 hours as needed for Congestion. 20 Tablet 0   • prazosin (MINIPRESS) 1 MG Cap Take 1 mg by mouth.     • cyanocobalamin (VITAMIN B-12) 100 MCG Tab Take 100 mcg by mouth every day.     • azelastine (ASTELIN) 137 MCG/SPRAY nasal spray Administer 1 Spray into affected nostril(S) 2 times a day. 30 mL 0   • buPROPion (WELLBUTRIN XL) 300 MG XL tablet Take 1 Tablet by mouth every morning. 90 Tablet 3   • albuterol 108 (90 Base) MCG/ACT Aero Soln inhalation aerosol Inhale 2 Puffs every 6 hours as needed for Shortness of Breath. 8.5 g 1   • sertraline (ZOLOFT) 50 MG Tab Take 1/2 tablet daily for 1 week, then 1 tablet daily thereafter Indications: Major Depressive Disorder, Posttraumatic Stress Disorder (Patient not taking: Reported on 2/18/2022)       No current  "facility-administered medications for this visit.       Social History     Tobacco Use   • Smoking status: Never Smoker   • Smokeless tobacco: Never Used   Vaping Use   • Vaping Use: Never used   Substance Use Topics   • Alcohol use: Never   • Drug use: Never     Comment:  CBD      Social History     Social History Narrative    ** Merged History Encounter **            Family History   Problem Relation Age of Onset   • Lupus Mother    • Heart Disease Mother    • Diabetes Father    • Heart Disease Father    • Diabetes Brother    • Heart Disease Maternal Uncle         MI   • Diabetes Maternal Grandfather    • Parkinson's Disease Paternal Grandmother    • Diabetes Paternal Grandmother    • Cancer Sister         cervical cancer   • Alcohol abuse Paternal Aunt    • Heart Disease Paternal Grandfather         Review of Systems   Constitutional: Positive for malaise/fatigue. Negative for chills, fever and weight loss.   HENT: Positive for congestion, ear pain and tinnitus. Negative for ear discharge, hearing loss, sinus pain and sore throat.         Left ear   Eyes: Negative.    Respiratory: Negative for cough, shortness of breath, wheezing and stridor.    Cardiovascular: Negative for chest pain and palpitations.   Skin: Negative.    Neurological: Negative for dizziness and headaches.             - NOTE: All other systems reviewed and are negative, except as in HPI.      Exam:    /78   Pulse 86   Temp 36.3 °C (97.4 °F) (Temporal)   Resp 18   Ht 1.676 m (5' 6\")   Wt (!) 127 kg (279 lb)   SpO2 100%  Body mass index is 45.03 kg/m².    General: Alert, pleasant, NAD  EYES:   PERRL, EOMI, no icterus or pallor.  Conjunctivae and lids normal.   HENT:  Normocephalic.  External ears normal. Tympanic membranes pearly, opaque.  No nasal drainage present.  Oropharynx non-erythematous, mucous membranes moist.  Neck supple.   No thyromegaly or masses palpated. No cervical or supraclavicular lymphadenopathy.  Respiratory: " Normal respiratory effort.   Skin: Warm, dry, no rashes.  Musculoskeletal: Gait is normal.  Moves all extremities well.    Extremities: No clubbing, cyanosis or edema noted.   Neurological: No tremors, sensation grossly intact, tone/strength normal, gait is normal, CN2-12 intact.  Psych:  Affect/mood is normal, judgement is good, memory is intact, grooming is appropriate.    Assessment/Plan:  1. Ear pain, left  - pseudoephedrine (SUDAFED) 30 MG Tab; Take 1 Tablet by mouth every 6 hours as needed for Congestion.  Dispense: 20 Tablet; Refill: 0    2. Sinus congestion  - pseudoephedrine (SUDAFED) 30 MG Tab; Take 1 Tablet by mouth every 6 hours as needed for Congestion.  Dispense: 20 Tablet; Refill: 0       Discussed with patient possible alternative diagnoses, patient is to take all medications as prescribed.     If symptoms persist FU w/PCP, if symptoms worsen go to emergency room.     If experiencing any side effects from prescribed medications reports to the office immediately or go to emergency room.    Reviewed indication, dosage, usage and potential adverse effects of prescribed medications.     Reviewed risks and benefits of treatment plan. Patient verbalizes understanding of all instruction and verbally agrees to plan.      Return in about 7 months (around 10/4/2022).    My total time spent caring for the patient on the day of the encounter was 24 minutes.   This does not include time spent on separately billable procedures/tests.     Please note that this dictation was created using voice recognition software. I have made every reasonable attempt to correct obvious errors, but I expect that there are errors of grammar and possibly content that I did not discover before finalizing the note.

## 2022-03-16 ENCOUNTER — HOSPITAL ENCOUNTER (EMERGENCY)
Facility: MEDICAL CENTER | Age: 37
End: 2022-03-16
Attending: EMERGENCY MEDICINE
Payer: COMMERCIAL

## 2022-03-16 ENCOUNTER — APPOINTMENT (OUTPATIENT)
Dept: RADIOLOGY | Facility: MEDICAL CENTER | Age: 37
End: 2022-03-16
Attending: EMERGENCY MEDICINE
Payer: COMMERCIAL

## 2022-03-16 VITALS
HEART RATE: 61 BPM | SYSTOLIC BLOOD PRESSURE: 155 MMHG | TEMPERATURE: 98.5 F | OXYGEN SATURATION: 97 % | BODY MASS INDEX: 46.76 KG/M2 | WEIGHT: 280.65 LBS | HEIGHT: 65 IN | RESPIRATION RATE: 16 BRPM | DIASTOLIC BLOOD PRESSURE: 83 MMHG

## 2022-03-16 DIAGNOSIS — R10.31 RLQ ABDOMINAL PAIN: ICD-10-CM

## 2022-03-16 DIAGNOSIS — R19.7 DIARRHEA OF PRESUMED INFECTIOUS ORIGIN: ICD-10-CM

## 2022-03-16 LAB
ANION GAP SERPL CALC-SCNC: 11 MMOL/L (ref 7–16)
APPEARANCE UR: CLEAR
BASOPHILS # BLD AUTO: 0.5 % (ref 0–1.8)
BASOPHILS # BLD: 0.05 K/UL (ref 0–0.12)
BILIRUB UR QL STRIP.AUTO: NEGATIVE
BUN SERPL-MCNC: 13 MG/DL (ref 8–22)
CALCIUM SERPL-MCNC: 9.2 MG/DL (ref 8.4–10.2)
CHLORIDE SERPL-SCNC: 103 MMOL/L (ref 96–112)
CO2 SERPL-SCNC: 20 MMOL/L (ref 20–33)
COLOR UR: NORMAL
CREAT SERPL-MCNC: 0.57 MG/DL (ref 0.5–1.4)
EOSINOPHIL # BLD AUTO: 0.26 K/UL (ref 0–0.51)
EOSINOPHIL NFR BLD: 2.6 % (ref 0–6.9)
ERYTHROCYTE [DISTWIDTH] IN BLOOD BY AUTOMATED COUNT: 41 FL (ref 35.9–50)
GFR SERPLBLD CREATININE-BSD FMLA CKD-EPI: 121 ML/MIN/1.73 M 2
GLUCOSE SERPL-MCNC: 101 MG/DL (ref 65–99)
GLUCOSE UR STRIP.AUTO-MCNC: NEGATIVE MG/DL
HCT VFR BLD AUTO: 45.3 % (ref 37–47)
HGB BLD-MCNC: 14.8 G/DL (ref 12–16)
IMM GRANULOCYTES # BLD AUTO: 0.03 K/UL (ref 0–0.11)
IMM GRANULOCYTES NFR BLD AUTO: 0.3 % (ref 0–0.9)
KETONES UR STRIP.AUTO-MCNC: NEGATIVE MG/DL
LEUKOCYTE ESTERASE UR QL STRIP.AUTO: NEGATIVE
LYMPHOCYTES # BLD AUTO: 2.06 K/UL (ref 1–4.8)
LYMPHOCYTES NFR BLD: 20.4 % (ref 22–41)
MCH RBC QN AUTO: 29.6 PG (ref 27–33)
MCHC RBC AUTO-ENTMCNC: 32.7 G/DL (ref 33.6–35)
MCV RBC AUTO: 90.6 FL (ref 81.4–97.8)
MICRO URNS: NORMAL
MONOCYTES # BLD AUTO: 0.51 K/UL (ref 0–0.85)
MONOCYTES NFR BLD AUTO: 5 % (ref 0–13.4)
NEUTROPHILS # BLD AUTO: 7.19 K/UL (ref 2–7.15)
NEUTROPHILS NFR BLD: 71.2 % (ref 44–72)
NITRITE UR QL STRIP.AUTO: NEGATIVE
NRBC # BLD AUTO: 0 K/UL
NRBC BLD-RTO: 0 /100 WBC
PH UR STRIP.AUTO: 6 [PH] (ref 5–8)
PLATELET # BLD AUTO: 393 K/UL (ref 164–446)
PMV BLD AUTO: 8.9 FL (ref 9–12.9)
POTASSIUM SERPL-SCNC: 4.1 MMOL/L (ref 3.6–5.5)
PROT UR QL STRIP: NEGATIVE MG/DL
RBC # BLD AUTO: 5 M/UL (ref 4.2–5.4)
RBC UR QL AUTO: NEGATIVE
SODIUM SERPL-SCNC: 134 MMOL/L (ref 135–145)
SP GR UR STRIP.AUTO: <=1.005
WBC # BLD AUTO: 10.1 K/UL (ref 4.8–10.8)

## 2022-03-16 PROCEDURE — 700117 HCHG RX CONTRAST REV CODE 255: Performed by: EMERGENCY MEDICINE

## 2022-03-16 PROCEDURE — 96375 TX/PRO/DX INJ NEW DRUG ADDON: CPT

## 2022-03-16 PROCEDURE — 36415 COLL VENOUS BLD VENIPUNCTURE: CPT

## 2022-03-16 PROCEDURE — 96374 THER/PROPH/DIAG INJ IV PUSH: CPT

## 2022-03-16 PROCEDURE — 74177 CT ABD & PELVIS W/CONTRAST: CPT

## 2022-03-16 PROCEDURE — 85025 COMPLETE CBC W/AUTO DIFF WBC: CPT

## 2022-03-16 PROCEDURE — 80048 BASIC METABOLIC PNL TOTAL CA: CPT

## 2022-03-16 PROCEDURE — 99284 EMERGENCY DEPT VISIT MOD MDM: CPT

## 2022-03-16 PROCEDURE — 700111 HCHG RX REV CODE 636 W/ 250 OVERRIDE (IP): Performed by: EMERGENCY MEDICINE

## 2022-03-16 PROCEDURE — 81003 URINALYSIS AUTO W/O SCOPE: CPT

## 2022-03-16 PROCEDURE — 96376 TX/PRO/DX INJ SAME DRUG ADON: CPT

## 2022-03-16 RX ORDER — ECHINACEA PURPUREA EXTRACT 125 MG
1 TABLET ORAL PRN
Status: SHIPPED | COMMUNITY
End: 2022-03-27

## 2022-03-16 RX ORDER — HYDROMORPHONE HYDROCHLORIDE 1 MG/ML
1 INJECTION, SOLUTION INTRAMUSCULAR; INTRAVENOUS; SUBCUTANEOUS ONCE
Status: COMPLETED | OUTPATIENT
Start: 2022-03-16 | End: 2022-03-16

## 2022-03-16 RX ORDER — KETOROLAC TROMETHAMINE 30 MG/ML
30 INJECTION, SOLUTION INTRAMUSCULAR; INTRAVENOUS ONCE
Status: COMPLETED | OUTPATIENT
Start: 2022-03-16 | End: 2022-03-16

## 2022-03-16 RX ADMIN — IOHEXOL 80 ML: 350 INJECTION, SOLUTION INTRAVENOUS at 13:46

## 2022-03-16 RX ADMIN — HYDROMORPHONE HYDROCHLORIDE 1 MG: 1 INJECTION, SOLUTION INTRAMUSCULAR; INTRAVENOUS; SUBCUTANEOUS at 12:55

## 2022-03-16 RX ADMIN — KETOROLAC TROMETHAMINE 30 MG: 30 INJECTION, SOLUTION INTRAMUSCULAR at 12:19

## 2022-03-16 RX ADMIN — HYDROMORPHONE HYDROCHLORIDE 1 MG: 1 INJECTION, SOLUTION INTRAMUSCULAR; INTRAVENOUS; SUBCUTANEOUS at 10:59

## 2022-03-16 ASSESSMENT — PAIN DESCRIPTION - PAIN TYPE
TYPE: ACUTE PAIN
TYPE: ACUTE PAIN

## 2022-03-16 ASSESSMENT — FIBROSIS 4 INDEX: FIB4 SCORE: 0.41

## 2022-03-16 ASSESSMENT — LIFESTYLE VARIABLES: DO YOU DRINK ALCOHOL: NO

## 2022-03-16 NOTE — ED NOTES
Patient is stable for discharge at this time, anticipatory guidance provided, close follow-up is encouraged, and ED return instructions have been detailed.

## 2022-03-16 NOTE — DISCHARGE INSTRUCTIONS
Abdominal Pain, Extended Version   Belly Pain, Stomach Pain    Take a clear fluid diet 12 hours and use the Profen and Tylenol for pain.  Return to emergency department if pain has not resolved in 12 to 24 hours.  Return to the ED sooner for worsening pain (especially in the lower right abdomen), pain that is worse with movement, uncontrolled vomiting, new fever, bleeding or ill appearance.    Your exam may not have shown the exact reason you have abdominal pain.  Since there are many different causes of abdominal pain, another checkup and more tests may be needed. One of the many possible causes of abdominal pain in any person who has not had their appendix removed is Acute Appendicitis.  Appendicitis is often a very difficult to diagnosis. Normal blood tests, urine tests, and even ultrasound and CT can not ensure there is not an early appendicitis. Sometimes only the changes which occur over time will allow appendicitis and other causes of abdominal pain to be determined.  Because of this, it is important you follow all of the instructions below.                                                                                                Home care instructions  Rest.  Do not eat solid food until your pain is gone.  While You Have Pain:  Stay on a clear liquid diet.  A clear liquid is one you can see through (water, weak tea, broth or bouillon, ginger ale, Jell-o, Jude-Aid, Gatorade, apple juice, popsicles or ice chips).   When Your Pain is Gone:  Start a light diet (dry toast, crackers, applesauce, white rice, bananas, broth or bouillon) and increase the diet slowly, as long as it does not bother you.  No dairy products (including cheese and eggs) and no spicy, fatty, fried or high fiber foods.  No alcohol, caffeine or cigarettes.  Take your regular medicines unless the caregiver told you not to.  Take any prescribed medicine as directed.  Do not take medicine containing aspirin, ibuprofen (Advil® / Motrin® ),  naprosyn/naproxen (Aleve®) or ketoprofen (Orudis® ) unless told to by your own caregiver.    seek immediate medical attention if :  Your pain is not gone in 8-12 hours.  Your pain becomes worse, changes location or feels different.  You have a fever or shaking chills.  You keep throwing up or cannot drink liquids.   You see blood when you throw up or see blood in your bowel movements.    Your bowel movements become dark or black.  You move your bowels frequently.  Your bowel movements stop (become blocked) or you cannot pass gas.  You have bloody, frequent or painful urination (“passing water”).  Your skin or the whites of your eyes look yellow.  Your stomach becomes bloated or bigger.  You notice bleeding or discharge from your vagina.  You have dizziness or fainting.  You have chest or back pain.   Anything else worries you.  You become short of breath.    If you have questions or concerns, please call your caregiver.     Adapted from ©2001  [employer] Aftercare Instruction Sheets  Last reviewed July 12, 2005, [employer], creator of [employer] Patient Information System.    ExitCare® Patient Information ©2007 [employer].

## 2022-03-16 NOTE — ED PROVIDER NOTES
ED Provider Note    CHIEF COMPLAINT  Chief Complaint   Patient presents with   • Bloody Stools   • Abdominal Pain       HPI  Katherine CUETO is a 36 y.o. female who presents for right lower quadrant abdominal pain onset this morning severity moderate.  She also had diarrhea yesterday and today and today the stool was reddish-purple.  She did eat red food coloring on cake yesterday.  No prior GI bleed.  The patient has intermittent right lower quadrant abdominal pain occurring about once a month since having a hysterectomy.  The last 2 weeks she has had pain every couple of days that has the last couple of hours.  Denies fever nausea vomiting or constipation.  Status post cholecystectomy.  No food poisoning travel camping or ill contacts.    REVIEW OF SYSTEMS  Pertinent positives include: Abdominal pain, diarrhea, reddish stool.  Pertinent negatives include: Fever, cough, swelling, rash, coronavirus.  10+ systems reviewed and negative.      PAST MEDICAL HISTORY  Past Medical History:   Diagnosis Date   • ASTHMA     medications not needed   • FHx: cleft palate 2010   • FHx: cleft palate 2010    Mother, brother, uncle (maternal), and aunt (maternal) with cleft palate    • GBS (group B Streptococcus carrier), +RV culture, currently pregnant 2010   • History of  2010   • History of COVID-19 2021   • History of macrosomia in infant in prior pregnancy, currently pregnant 2010   • History of macrosomia in infant in prior pregnancy, currently pregnant 2010    9lb5oz    • Hypoglycemia     at 16 years old   • Previous  section- desires Repeat 2010   • SUPERVISION OF HIGH-RISK PREGNANCY 2010   • SUPERVISION OF HIGH-RISK PREGNANCY 2010       FAMILY HISTORY  Family History   Problem Relation Age of Onset   • Lupus Mother    • Heart Disease Mother    • Diabetes Father    • Heart Disease Father    • Diabetes Brother    • Heart  Disease Maternal Uncle         MI   • Diabetes Maternal Grandfather    • Parkinson's Disease Paternal Grandmother    • Diabetes Paternal Grandmother    • Cancer Sister         cervical cancer   • Alcohol abuse Paternal Aunt    • Heart Disease Paternal Grandfather        SOCIAL HISTORY  Social History     Tobacco Use   • Smoking status: Never Smoker   • Smokeless tobacco: Never Used   Vaping Use   • Vaping Use: Never used   Substance Use Topics   • Alcohol use: Never   • Drug use: Never     Comment:  CBD      Social History     Substance and Sexual Activity   Drug Use Never    Comment:  CBD        SURGICAL HISTORY  Past Surgical History:   Procedure Laterality Date   • HYSTERECTOMY LAPAROSCOPY  07/09/2021    still has ovaries   • CHOLECYSTECTOMY  10/2020   • REPEAT C SECTION W TUBAL LIGATION  9/9/2010    Performed by [facility]   • PRIMARY C SECTION  2008   • TONSILLECTOMY  at age 5       CURRENT MEDICATIONS  No current facility-administered medications for this encounter.     Current Outpatient Medications   Medication Sig Dispense Refill   • loratadine (CLARITIN) 10 MG Tab Take 10 mg by mouth every day.     • pseudoephedrine (SUDAFED) 30 MG Tab Take 1 Tablet by mouth every 6 hours as needed for Congestion. 20 Tablet 0   • prazosin (MINIPRESS) 1 MG Cap Take 1 mg by mouth.     • sertraline (ZOLOFT) 50 MG Tab Take 1/2 tablet daily for 1 week, then 1 tablet daily thereafter Indications: Major Depressive Disorder, Posttraumatic Stress Disorder (Patient not taking: Reported on 2/18/2022)     • cyanocobalamin (VITAMIN B-12) 100 MCG Tab Take 100 mcg by mouth every day.     • azelastine (ASTELIN) 137 MCG/SPRAY nasal spray Administer 1 Spray into affected nostril(S) 2 times a day. 30 mL 0   • buPROPion (WELLBUTRIN XL) 300 MG XL tablet Take 1 Tablet by mouth every morning. 90 Tablet 3   • albuterol 108 (90 Base) MCG/ACT Aero Soln inhalation aerosol Inhale 2 Puffs every 6 hours as needed for  "Shortness of Breath. 8.5 g 1       ALLERGIES  Allergies   Allergen Reactions   • Chocolate Diarrhea, Hives and Itching     tongue itching  tongue itching     • Citrus Nausea and Hives   • Latex Hives and Unspecified     Skin burns.  burning  burning   • Peanut Oil Unspecified     itching  itching   • Tomato Itching     Itchy tongue  Itchy tongue   • Milk Protein Extract      Other reaction(s): .Unknown   • Acetaminophen Nausea     \"Upset stomach when taken on empty stomach\"   • Morphine Vomiting       PHYSICAL EXAM  VITAL SIGNS: /88   Pulse 75   Temp 36.9 °C (98.5 °F) (Temporal)   Resp 18   Ht 1.651 m (5' 5\")   Wt (!) 127 kg (280 lb 10.3 oz)   LMP 04/09/2012   SpO2 97%   BMI 46.70 kg/m²   Reviewed and high normal blood pressure, afebrile  Constitutional: Well developed, Well nourished, well-appearing, elevated BMI.  HENT: Normocephalic, atraumatic, bilateral external ears normal, Wearing mask.   Eyes: PERRLA, conjunctiva pink, no scleral icterus.   Cardiovascular: Regular S1-S2 without murmur, rub, gallop.  No dependent edema or calf tenderness.  Respiratory: No rales, rhonchi, wheeze or cough.  Gastrointestinal: Soft, nontender, nondistended, no organomegaly.  Scant brownish-red material stains Hemoccult negative  Skin: No erythema, no rash.   Genitourinary:  No costovertebral angle tenderness.   Neurologic: Alert & oriented x 3, cranial nerves 2-12 intact by passive exam.  No focal deficit noted.  Psychiatric: Affect normal, Judgment normal, Mood normal.     DIFFERENTIAL DIAGNOSIS:  Adhesions, bowel obstruction.    RADIOLOGY/PROCEDURES  CT-ABDOMEN-PELVIS WITH   Final Result      1.  No evidence of bowel obstruction or focal inflammatory change.      2.  Fatty liver.      3.  Prior cholecystectomy.      CT abdomen pelvis reviewed from last year prior to hysterectomy demonstrating fibroid.  No diverticula.  Status post cholecystectomy.    LABORATORY:  Results for orders placed or performed during the " hospital encounter of 03/16/22   CBC WITH DIFFERENTIAL   Result Value Ref Range    WBC 10.1 4.8 - 10.8 K/uL    RBC 5.00 4.20 - 5.40 M/uL    Hemoglobin 14.8 12.0 - 16.0 g/dL    Hematocrit 45.3 37.0 - 47.0 %    MCV 90.6 81.4 - 97.8 fL    MCH 29.6 27.0 - 33.0 pg    MCHC 32.7 (L) 33.6 - 35.0 g/dL    RDW 41.0 35.9 - 50.0 fL    Platelet Count 393 164 - 446 K/uL    MPV 8.9 (L) 9.0 - 12.9 fL    Neutrophils-Polys 71.20 44.00 - 72.00 %    Lymphocytes 20.40 (L) 22.00 - 41.00 %    Monocytes 5.00 0.00 - 13.40 %    Eosinophils 2.60 0.00 - 6.90 %    Basophils 0.50 0.00 - 1.80 %    Immature Granulocytes 0.30 0.00 - 0.90 %    Nucleated RBC 0.00 /100 WBC    Neutrophils (Absolute) 7.19 (H) 2.00 - 7.15 K/uL    Lymphs (Absolute) 2.06 1.00 - 4.80 K/uL    Monos (Absolute) 0.51 0.00 - 0.85 K/uL    Eos (Absolute) 0.26 0.00 - 0.51 K/uL    Baso (Absolute) 0.05 0.00 - 0.12 K/uL    Immature Granulocytes (abs) 0.03 0.00 - 0.11 K/uL    NRBC (Absolute) 0.00 K/uL   Basic Metabolic Panel   Result Value Ref Range    Sodium 134 (L) 135 - 145 mmol/L    Potassium 4.1 3.6 - 5.5 mmol/L    Chloride 103 96 - 112 mmol/L    Co2 20 20 - 33 mmol/L    Glucose 101 (H) 65 - 99 mg/dL    Bun 13 8 - 22 mg/dL    Creatinine 0.57 0.50 - 1.40 mg/dL    Calcium 9.2 8.4 - 10.2 mg/dL    Anion Gap 11.0 7.0 - 16.0   URINALYSIS    Specimen: Urine   Result Value Ref Range    Color Straw     Character Clear     Specific Gravity <=1.005 <1.035    Ph 6.0 5.0 - 8.0    Glucose Negative Negative mg/dL    Ketones Negative Negative mg/dL    Protein Negative Negative mg/dL    Bilirubin Negative Negative    Nitrite Negative Negative    Leukocyte Esterase Negative Negative    Occult Blood Negative Negative    Micro Urine Req see below    ESTIMATED GFR   Result Value Ref Range    GFR (CKD-EPI) 121 >60 mL/min/1.73 m 2       INTERVENTIONS:  Medications   HYDROmorphone (Dilaudid) injection 1 mg (1 mg Intravenous Given 3/16/22 1059)   ketorolac (TORADOL) injection 30 mg (30 mg Intravenous Given  3/16/22 1219)   HYDROmorphone (Dilaudid) injection 1 mg (1 mg Intravenous Given 3/16/22 1255)     Response: Brief improvement in pain with hydromorphone then severe pain returned.      COURSE & MEDICAL DECISION MAKING  12:43 PM  Assessed.  Now complains of severe pain despite having received hydromorphone and Toradol.  Mild to moderate tenderness in the right lower quadrant now without rebound or guarding.  CT imaging ordered.    This patient presents with abdominal pain that has been chronic intermittent over the last year.  This is likely adhesion pain.  There is no evidence of appendicitis diverticulitis renal colic or UTI.  She is status post hysterectomy.    PLAN:  Ibuprofen and Tylenol  Abdominal pain handout given  Return for uncontrolled vomiting, severe pain, fever, GI bleed, dizziness, ill appearance    Rukhsana Mcintyre A.P.R.N.  [address] [facility] [address]  [facility] [address]  [phone]    Schedule an appointment as soon as possible for a visit   As needed if not better Friday    Darci Kohler M.D.  [address] [facility]  [address]  [facility] [address]  [phone]    Schedule an appointment as soon as possible for a visit       Makenzie Haley M.D.  [address] [facility] [id number] [phone]    Schedule an appointment as soon as possible for a visit         CONDITION: Stable.    FINAL IMPRESSION  1. RLQ abdominal pain    2. Diarrhea of presumed infectious origin          Electronically signed by: Suleiman Espinoza M.D., 3/16/2022 10:11 AM

## 2022-03-16 NOTE — ED TRIAGE NOTES
Pt amb to rm#4; RLQ pain, blood in stool since this am. Pt denies hx of GI bleed. Pt has hx hysterectomy. Pt has hx cholecystectomy

## 2022-03-22 ENCOUNTER — OFFICE VISIT (OUTPATIENT)
Dept: MEDICAL GROUP | Facility: PHYSICIAN GROUP | Age: 37
End: 2022-03-22
Payer: OTHER GOVERNMENT

## 2022-03-22 VITALS
DIASTOLIC BLOOD PRESSURE: 84 MMHG | SYSTOLIC BLOOD PRESSURE: 128 MMHG | HEART RATE: 96 BPM | TEMPERATURE: 97.5 F | BODY MASS INDEX: 45.42 KG/M2 | WEIGHT: 282.63 LBS | RESPIRATION RATE: 18 BRPM | HEIGHT: 66 IN | OXYGEN SATURATION: 98 %

## 2022-03-22 DIAGNOSIS — K76.0 HEPATIC STEATOSIS: ICD-10-CM

## 2022-03-22 DIAGNOSIS — R10.31 RIGHT LOWER QUADRANT ABDOMINAL PAIN: ICD-10-CM

## 2022-03-22 PROCEDURE — 99214 OFFICE O/P EST MOD 30 MIN: CPT | Performed by: STUDENT IN AN ORGANIZED HEALTH CARE EDUCATION/TRAINING PROGRAM

## 2022-03-22 RX ORDER — TRAMADOL HYDROCHLORIDE 50 MG/1
50 TABLET ORAL EVERY 12 HOURS PRN
Qty: 14 TABLET | Refills: 0 | Status: SHIPPED | OUTPATIENT
Start: 2022-03-22 | End: 2022-03-27 | Stop reason: SDUPTHER

## 2022-03-22 ASSESSMENT — ENCOUNTER SYMPTOMS
DIARRHEA: 1
NAUSEA: 1
CHILLS: 1
VOMITING: 0
ABDOMINAL PAIN: 1
DIAPHORESIS: 1
CONSTIPATION: 0
SPUTUM PRODUCTION: 0
BLOOD IN STOOL: 0
FEVER: 0

## 2022-03-22 ASSESSMENT — FIBROSIS 4 INDEX: FIB4 SCORE: 0.41

## 2022-03-22 NOTE — ASSESSMENT & PLAN NOTE
Incidentally found on the CT scan of her abdomen. Using her CMP from 6/2021 she is unlikely to have significant fibrosis. She may be indicated for bariatric surgery as her BMI is 45

## 2022-03-22 NOTE — ASSESSMENT & PLAN NOTE
Patient is reporting severe debilitating right lower quadrant pain now radiating to her left lower quadrant for the last week now.  She is also reporting chills, night sweats night.  I have reviewed the emergency report from 3/16/2022.  The CT of her abdomen shows no acute pathology, lab work within normal limits, urine within normal limits.  Unclear etiology at this point.    Plan:  -Patient requesting FMLA paperwork will write until the next week 3/30/2022  -MRI abdomen obtained  -Tramadol twice daily for 7 days, otherwise continue ibuprofen, Tylenol.  -Pain may be related to chronic pain secondary to her hysterectomy and adhesions.  If etiology still unclear recommend sending her to pain management for further management of this issue.

## 2022-03-22 NOTE — PROGRESS NOTES
HISTORY OF PRESENT ILLNESS: Katherine is a pleasant 36 y.o. female, established patient who presents today to discuss medical problems as listed below:    Problem   Right Lower Quadrant Abdominal Pain    R LQ abd pain and L LQ pain.   Pain is constantly there at highest 8-8/10. Nothing she knows exacerbates it just happens randomly.   - has been going since last week. Has happened before last year and then went away on its own.   - having diarrhea intermittently, nausea but no vomiting. No blood in stool, no dysuria, no hematuria. Denies constipation.   - having subjective fevers and night sweats, fatigue.  - seeing GI specialist tomorrow.   - had a hysterectomy last year and that was around she had this pain last year.             Hepatic Steatosis         Current Outpatient Medications Ordered in Epic   Medication Sig Dispense Refill   • traMADol (ULTRAM) 50 MG Tab Take 1 Tablet by mouth every 12 hours as needed for Moderate Pain for up to 7 days. 14 Tablet 0   • sodium chloride (OCEAN) 0.65 % Solution Administer 1 Spray into affected nostril(S) as needed for Congestion.     • multivitamin (THERAGRAN) Tab Take 1 Tablet by mouth every day.     • prazosin (MINIPRESS) 1 MG Cap Take 1 mg by mouth every evening.     • sertraline (ZOLOFT) 50 MG Tab Take 50 mg by mouth every evening.     • Cyanocobalamin (VITAMIN B-12 PO) Take 100 mcg by mouth every day.     • buPROPion (WELLBUTRIN XL) 300 MG XL tablet Take 1 Tablet by mouth every morning. (Patient taking differently: Take 300 mg by mouth every evening.) 90 Tablet 3   • albuterol 108 (90 Base) MCG/ACT Aero Soln inhalation aerosol Inhale 2 Puffs every 6 hours as needed for Shortness of Breath. 8.5 g 1     No current [facility]-ordered facility-administered medications on file.       Review of Systems   Constitutional: Positive for chills, diaphoresis and malaise/fatigue. Negative for fever.   Respiratory: Negative for sputum production.    Cardiovascular: Negative for chest  pain.   Gastrointestinal: Positive for abdominal pain, diarrhea and nausea. Negative for blood in stool, constipation, melena and vomiting.   Genitourinary: Negative for dysuria, frequency and hematuria.        Past Medical History:   Diagnosis Date   • ASTHMA     medications not needed   • FHx: cleft palate 2010   • FHx: cleft palate 2010    Mother, brother, uncle (maternal), and aunt (maternal) with cleft palate    • GBS (group B Streptococcus carrier), +RV culture, currently pregnant 2010   • History of  2010   • History of COVID-19 2021   • History of macrosomia in infant in prior pregnancy, currently pregnant 2010   • History of macrosomia in infant in prior pregnancy, currently pregnant 2010    9lb5oz    • Hypoglycemia     at 16 years old   • Previous  section- desires Repeat 2010   • SUPERVISION OF HIGH-RISK PREGNANCY 2010   • SUPERVISION OF HIGH-RISK PREGNANCY 2010     Past Surgical History:   Procedure Laterality Date   • HYSTERECTOMY LAPAROSCOPY  2021    still has ovaries   • CHOLECYSTECTOMY  10/2020   • REPEAT C SECTION W TUBAL LIGATION  2010    Performed by JF DOHERTY at [facility]   • PRIMARY C SECTION     • TONSILLECTOMY  at age 5     Social History     Tobacco Use   • Smoking status: Never Smoker   • Smokeless tobacco: Never Used   Vaping Use   • Vaping Use: Never used   Substance Use Topics   • Alcohol use: Never   • Drug use: Never     Comment:  CBD       Family History   Problem Relation Age of Onset   • Lupus Mother    • Heart Disease Mother    • Diabetes Father    • Heart Disease Father    • Diabetes Brother    • Heart Disease Maternal Uncle         MI   • Diabetes Maternal Grandfather    • Parkinson's Disease Paternal Grandmother    • Diabetes Paternal Grandmother    • Cancer Sister         cervical cancer   • Alcohol abuse Paternal Aunt    • Heart Disease Paternal  "Grandfather      Current Outpatient Medications   Medication Sig Dispense Refill   • traMADol (ULTRAM) 50 MG Tab Take 1 Tablet by mouth every 12 hours as needed for Moderate Pain for up to 7 days. 14 Tablet 0   • sodium chloride (OCEAN) 0.65 % Solution Administer 1 Spray into affected nostril(S) as needed for Congestion.     • multivitamin (THERAGRAN) Tab Take 1 Tablet by mouth every day.     • prazosin (MINIPRESS) 1 MG Cap Take 1 mg by mouth every evening.     • sertraline (ZOLOFT) 50 MG Tab Take 50 mg by mouth every evening.     • Cyanocobalamin (VITAMIN B-12 PO) Take 100 mcg by mouth every day.     • buPROPion (WELLBUTRIN XL) 300 MG XL tablet Take 1 Tablet by mouth every morning. (Patient taking differently: Take 300 mg by mouth every evening.) 90 Tablet 3   • albuterol 108 (90 Base) MCG/ACT Aero Soln inhalation aerosol Inhale 2 Puffs every 6 hours as needed for Shortness of Breath. 8.5 g 1     No current facility-administered medications for this visit.       Allergies:  Allergies   Allergen Reactions   • Chocolate Hives, Diarrhea and Itching     tongue itching     • Citrus Nausea and Hives   • Latex Hives and Unspecified     Skin burns.     • Peanut Oil Itching     itching     • Tomato Itching     Itchy tongue     • Milk Protein Extract      Pt reports that she gets gassy    • Acetaminophen Nausea     \"Upset stomach when taken on empty stomach\"   • Morphine Vomiting       Allergies, past medical history, past surgical history, family history, social history reviewed and updated.    Objective:    /84   Pulse 96   Temp 36.4 °C (97.5 °F) (Temporal)   Resp 18   Ht 1.676 m (5' 6\")   Wt (!) 128 kg (282 lb 10.1 oz)   LMP 04/09/2012   SpO2 98%   BMI 45.62 kg/m²    Body mass index is 45.62 kg/m².    Physical Exam  Constitutional:       Appearance: Normal appearance. She is obese. She is not ill-appearing or diaphoretic.   Cardiovascular:      Rate and Rhythm: Normal rate and regular rhythm.      Pulses: " Normal pulses.      Heart sounds: Normal heart sounds. No murmur heard.  Pulmonary:      Effort: Pulmonary effort is normal. No respiratory distress.      Breath sounds: Normal breath sounds. No stridor. No wheezing, rhonchi or rales.   Abdominal:      General: Abdomen is flat. Bowel sounds are normal. There is no distension.      Palpations: Abdomen is soft. There is no mass.      Tenderness: There is abdominal tenderness. There is no right CVA tenderness, left CVA tenderness, guarding or rebound.      Hernia: No hernia is present.      Comments: Tenderness palpation right lower quadrant.   Neurological:      Mental Status: She is alert.          LABS:   White blood cell count 10.1  Hemoglobin 14.8  Platelet 393  Urine leuk esterase negative, nitrite negative, blood negative  GFR within normal limits  Creatinine within normal limits    CT scan abdomen  No evidence of bowel obstruction, inflammatory change  Hepatic steatosis  Prior cholecystectomy  Partial Hysterectomy      Assessment/Plan:    Problem List Items Addressed This Visit     Right lower quadrant abdominal pain      Patient is reporting severe debilitating right lower quadrant pain now radiating to her left lower quadrant for the last week now.  She is also reporting chills, night sweats night.  I have reviewed the emergency report from 3/16/2022.  The CT of her abdomen shows no acute pathology, lab work within normal limits, urine within normal limits.  Unclear etiology at this point.    Plan:  -Patient requesting [facility] paperwork will write until the next week 3/30/2022  -MRI abdomen obtained  -Tramadol twice daily for 7 days, otherwise continue ibuprofen, Tylenol.  -Pain may be related to chronic pain secondary to her hysterectomy and adhesions.  If etiology still unclear recommend sending her to pain management for further management of this issue.         Relevant Medications    traMADol (ULTRAM) 50 MG Tab    Other Relevant Orders    [id number]     Hepatic steatosis     Incidentally found on the CT scan of her abdomen. Using her CMP from 6/2021 she is unlikely to have significant fibrosis. She may be indicated for bariatric surgery as her BMI is 45                Return in about 1 week (around 3/29/2022) for symptoms.

## 2022-03-23 ENCOUNTER — HOSPITAL ENCOUNTER (OUTPATIENT)
Dept: LAB | Facility: MEDICAL CENTER | Age: 37
End: 2022-03-23
Attending: INTERNAL MEDICINE
Payer: COMMERCIAL

## 2022-03-23 LAB
ALBUMIN SERPL BCP-MCNC: 4.5 G/DL (ref 3.2–4.9)
ALBUMIN/GLOB SERPL: 1.8 G/DL
ALP SERPL-CCNC: 78 U/L (ref 30–99)
ALT SERPL-CCNC: 25 U/L (ref 2–50)
ANION GAP SERPL CALC-SCNC: 17 MMOL/L (ref 7–16)
AST SERPL-CCNC: 20 U/L (ref 12–45)
BASOPHILS # BLD AUTO: 0.5 % (ref 0–1.8)
BASOPHILS # BLD: 0.05 K/UL (ref 0–0.12)
BILIRUB SERPL-MCNC: 0.3 MG/DL (ref 0.1–1.5)
BUN SERPL-MCNC: 10 MG/DL (ref 8–22)
CALCIUM SERPL-MCNC: 9.1 MG/DL (ref 8.5–10.5)
CHLORIDE SERPL-SCNC: 101 MMOL/L (ref 96–112)
CO2 SERPL-SCNC: 19 MMOL/L (ref 20–33)
CREAT SERPL-MCNC: 0.55 MG/DL (ref 0.5–1.4)
CRP SERPL HS-MCNC: 1.02 MG/DL (ref 0–0.75)
EOSINOPHIL # BLD AUTO: 0.23 K/UL (ref 0–0.51)
EOSINOPHIL NFR BLD: 2.3 % (ref 0–6.9)
ERYTHROCYTE [DISTWIDTH] IN BLOOD BY AUTOMATED COUNT: 40.5 FL (ref 35.9–50)
GFR SERPLBLD CREATININE-BSD FMLA CKD-EPI: 122 ML/MIN/1.73 M 2
GLOBULIN SER CALC-MCNC: 2.5 G/DL (ref 1.9–3.5)
GLUCOSE SERPL-MCNC: 82 MG/DL (ref 65–99)
HCT VFR BLD AUTO: 42.7 % (ref 37–47)
HGB BLD-MCNC: 14.1 G/DL (ref 12–16)
IMM GRANULOCYTES # BLD AUTO: 0.03 K/UL (ref 0–0.11)
IMM GRANULOCYTES NFR BLD AUTO: 0.3 % (ref 0–0.9)
LYMPHOCYTES # BLD AUTO: 1.97 K/UL (ref 1–4.8)
LYMPHOCYTES NFR BLD: 19.5 % (ref 22–41)
MCH RBC QN AUTO: 29.2 PG (ref 27–33)
MCHC RBC AUTO-ENTMCNC: 33 G/DL (ref 33.6–35)
MCV RBC AUTO: 88.4 FL (ref 81.4–97.8)
MONOCYTES # BLD AUTO: 0.52 K/UL (ref 0–0.85)
MONOCYTES NFR BLD AUTO: 5.1 % (ref 0–13.4)
NEUTROPHILS # BLD AUTO: 7.3 K/UL (ref 2–7.15)
NEUTROPHILS NFR BLD: 72.3 % (ref 44–72)
NRBC # BLD AUTO: 0 K/UL
NRBC BLD-RTO: 0 /100 WBC
PLATELET # BLD AUTO: 406 K/UL (ref 164–446)
PMV BLD AUTO: 9.1 FL (ref 9–12.9)
POTASSIUM SERPL-SCNC: 4.4 MMOL/L (ref 3.6–5.5)
PROT SERPL-MCNC: 7 G/DL (ref 6–8.2)
RBC # BLD AUTO: 4.83 M/UL (ref 4.2–5.4)
SODIUM SERPL-SCNC: 137 MMOL/L (ref 135–145)
WBC # BLD AUTO: 10.1 K/UL (ref 4.8–10.8)

## 2022-03-23 PROCEDURE — 36415 COLL VENOUS BLD VENIPUNCTURE: CPT

## 2022-03-23 PROCEDURE — 86140 C-REACTIVE PROTEIN: CPT

## 2022-03-23 PROCEDURE — 80053 COMPREHEN METABOLIC PANEL: CPT

## 2022-03-23 PROCEDURE — 85025 COMPLETE CBC W/AUTO DIFF WBC: CPT

## 2022-03-24 ENCOUNTER — HOSPITAL ENCOUNTER (OUTPATIENT)
Dept: RADIOLOGY | Facility: MEDICAL CENTER | Age: 37
End: 2022-03-24
Attending: INTERNAL MEDICINE
Payer: COMMERCIAL

## 2022-03-24 DIAGNOSIS — R10.31 RIGHT LOWER QUADRANT PAIN: ICD-10-CM

## 2022-03-24 PROCEDURE — 76830 TRANSVAGINAL US NON-OB: CPT

## 2022-03-25 ENCOUNTER — HOSPITAL ENCOUNTER (OUTPATIENT)
Dept: RADIOLOGY | Facility: MEDICAL CENTER | Age: 37
End: 2022-03-25
Attending: STUDENT IN AN ORGANIZED HEALTH CARE EDUCATION/TRAINING PROGRAM
Payer: COMMERCIAL

## 2022-03-25 DIAGNOSIS — R10.31 RIGHT LOWER QUADRANT ABDOMINAL PAIN: ICD-10-CM

## 2022-03-25 PROCEDURE — 74181 MRI ABDOMEN W/O CONTRAST: CPT

## 2022-03-27 ENCOUNTER — APPOINTMENT (OUTPATIENT)
Dept: RADIOLOGY | Facility: MEDICAL CENTER | Age: 37
End: 2022-03-27
Attending: EMERGENCY MEDICINE
Payer: OTHER GOVERNMENT

## 2022-03-27 ENCOUNTER — HOSPITAL ENCOUNTER (EMERGENCY)
Facility: MEDICAL CENTER | Age: 37
End: 2022-03-28
Attending: EMERGENCY MEDICINE
Payer: OTHER GOVERNMENT

## 2022-03-27 DIAGNOSIS — R10.31 RIGHT LOWER QUADRANT ABDOMINAL PAIN: ICD-10-CM

## 2022-03-27 LAB
ALBUMIN SERPL BCP-MCNC: 3.8 G/DL (ref 3.2–4.9)
ALBUMIN/GLOB SERPL: 1.2 G/DL
ALP SERPL-CCNC: 76 U/L (ref 30–99)
ALT SERPL-CCNC: 22 U/L (ref 2–50)
ANION GAP SERPL CALC-SCNC: 13 MMOL/L (ref 7–16)
APPEARANCE UR: CLEAR
AST SERPL-CCNC: 26 U/L (ref 12–45)
BASOPHILS # BLD AUTO: 0.6 % (ref 0–1.8)
BASOPHILS # BLD: 0.06 K/UL (ref 0–0.12)
BILIRUB SERPL-MCNC: <0.2 MG/DL (ref 0.1–1.5)
BILIRUB UR QL STRIP.AUTO: NEGATIVE
BUN SERPL-MCNC: 15 MG/DL (ref 8–22)
CALCIUM SERPL-MCNC: 9 MG/DL (ref 8.4–10.2)
CHLORIDE SERPL-SCNC: 103 MMOL/L (ref 96–112)
CO2 SERPL-SCNC: 20 MMOL/L (ref 20–33)
COLOR UR: YELLOW
CREAT SERPL-MCNC: 0.7 MG/DL (ref 0.5–1.4)
EOSINOPHIL # BLD AUTO: 0.34 K/UL (ref 0–0.51)
EOSINOPHIL NFR BLD: 3.4 % (ref 0–6.9)
ERYTHROCYTE [DISTWIDTH] IN BLOOD BY AUTOMATED COUNT: 39.8 FL (ref 35.9–50)
GFR SERPLBLD CREATININE-BSD FMLA CKD-EPI: 115 ML/MIN/1.73 M 2
GLOBULIN SER CALC-MCNC: 3.3 G/DL (ref 1.9–3.5)
GLUCOSE SERPL-MCNC: 121 MG/DL (ref 65–99)
GLUCOSE UR STRIP.AUTO-MCNC: NEGATIVE MG/DL
HCG SERPL QL: NEGATIVE
HCT VFR BLD AUTO: 39.3 % (ref 37–47)
HGB BLD-MCNC: 13.3 G/DL (ref 12–16)
IMM GRANULOCYTES # BLD AUTO: 0.05 K/UL (ref 0–0.11)
IMM GRANULOCYTES NFR BLD AUTO: 0.5 % (ref 0–0.9)
KETONES UR STRIP.AUTO-MCNC: ABNORMAL MG/DL
LEUKOCYTE ESTERASE UR QL STRIP.AUTO: NEGATIVE
LIPASE SERPL-CCNC: 31 U/L (ref 7–58)
LYMPHOCYTES # BLD AUTO: 2.15 K/UL (ref 1–4.8)
LYMPHOCYTES NFR BLD: 21.4 % (ref 22–41)
MCH RBC QN AUTO: 29.8 PG (ref 27–33)
MCHC RBC AUTO-ENTMCNC: 33.8 G/DL (ref 33.6–35)
MCV RBC AUTO: 87.9 FL (ref 81.4–97.8)
MICRO URNS: ABNORMAL
MONOCYTES # BLD AUTO: 0.61 K/UL (ref 0–0.85)
MONOCYTES NFR BLD AUTO: 6.1 % (ref 0–13.4)
NEUTROPHILS # BLD AUTO: 6.84 K/UL (ref 2–7.15)
NEUTROPHILS NFR BLD: 68 % (ref 44–72)
NITRITE UR QL STRIP.AUTO: NEGATIVE
NRBC # BLD AUTO: 0 K/UL
NRBC BLD-RTO: 0 /100 WBC
PH UR STRIP.AUTO: 5.5 [PH] (ref 5–8)
PLATELET # BLD AUTO: 384 K/UL (ref 164–446)
PMV BLD AUTO: 8.8 FL (ref 9–12.9)
POTASSIUM SERPL-SCNC: 4.7 MMOL/L (ref 3.6–5.5)
PROT SERPL-MCNC: 7.1 G/DL (ref 6–8.2)
PROT UR QL STRIP: NEGATIVE MG/DL
RBC # BLD AUTO: 4.47 M/UL (ref 4.2–5.4)
RBC UR QL AUTO: NEGATIVE
SODIUM SERPL-SCNC: 136 MMOL/L (ref 135–145)
SP GR UR STRIP.AUTO: 1.02
WBC # BLD AUTO: 10.1 K/UL (ref 4.8–10.8)

## 2022-03-27 PROCEDURE — 84703 CHORIONIC GONADOTROPIN ASSAY: CPT

## 2022-03-27 PROCEDURE — 96374 THER/PROPH/DIAG INJ IV PUSH: CPT

## 2022-03-27 PROCEDURE — 36415 COLL VENOUS BLD VENIPUNCTURE: CPT

## 2022-03-27 PROCEDURE — 700101 HCHG RX REV CODE 250: Performed by: EMERGENCY MEDICINE

## 2022-03-27 PROCEDURE — 81003 URINALYSIS AUTO W/O SCOPE: CPT

## 2022-03-27 PROCEDURE — 99285 EMERGENCY DEPT VISIT HI MDM: CPT

## 2022-03-27 PROCEDURE — 83690 ASSAY OF LIPASE: CPT

## 2022-03-27 PROCEDURE — 700111 HCHG RX REV CODE 636 W/ 250 OVERRIDE (IP): Performed by: EMERGENCY MEDICINE

## 2022-03-27 PROCEDURE — 96375 TX/PRO/DX INJ NEW DRUG ADDON: CPT

## 2022-03-27 PROCEDURE — 80053 COMPREHEN METABOLIC PANEL: CPT

## 2022-03-27 PROCEDURE — 700105 HCHG RX REV CODE 258: Performed by: EMERGENCY MEDICINE

## 2022-03-27 PROCEDURE — 76857 US EXAM PELVIC LIMITED: CPT

## 2022-03-27 PROCEDURE — 85025 COMPLETE CBC W/AUTO DIFF WBC: CPT

## 2022-03-27 PROCEDURE — 96376 TX/PRO/DX INJ SAME DRUG ADON: CPT

## 2022-03-27 RX ORDER — TRAMADOL HYDROCHLORIDE 50 MG/1
50 TABLET ORAL EVERY 12 HOURS PRN
Qty: 14 TABLET | Refills: 0 | Status: SHIPPED | OUTPATIENT
Start: 2022-03-27 | End: 2022-04-03

## 2022-03-27 RX ORDER — HYDROMORPHONE HYDROCHLORIDE 1 MG/ML
1 INJECTION, SOLUTION INTRAMUSCULAR; INTRAVENOUS; SUBCUTANEOUS ONCE
Status: COMPLETED | OUTPATIENT
Start: 2022-03-27 | End: 2022-03-27

## 2022-03-27 RX ORDER — DIAZEPAM 5 MG/ML
2.5 INJECTION, SOLUTION INTRAMUSCULAR; INTRAVENOUS ONCE
Status: COMPLETED | OUTPATIENT
Start: 2022-03-27 | End: 2022-03-27

## 2022-03-27 RX ORDER — ONDANSETRON 2 MG/ML
4 INJECTION INTRAMUSCULAR; INTRAVENOUS ONCE
Status: COMPLETED | OUTPATIENT
Start: 2022-03-27 | End: 2022-03-27

## 2022-03-27 RX ORDER — KETOROLAC TROMETHAMINE 30 MG/ML
30 INJECTION, SOLUTION INTRAMUSCULAR; INTRAVENOUS ONCE
Status: COMPLETED | OUTPATIENT
Start: 2022-03-27 | End: 2022-03-27

## 2022-03-27 RX ORDER — ACETAMINOPHEN 500 MG
2000 TABLET ORAL EVERY 6 HOURS PRN
Status: SHIPPED | COMMUNITY
End: 2022-06-03

## 2022-03-27 RX ORDER — DIAZEPAM 5 MG/ML
5 INJECTION, SOLUTION INTRAMUSCULAR; INTRAVENOUS ONCE
Status: COMPLETED | OUTPATIENT
Start: 2022-03-27 | End: 2022-03-27

## 2022-03-27 RX ORDER — SODIUM CHLORIDE, SODIUM LACTATE, POTASSIUM CHLORIDE, CALCIUM CHLORIDE 600; 310; 30; 20 MG/100ML; MG/100ML; MG/100ML; MG/100ML
1000 INJECTION, SOLUTION INTRAVENOUS ONCE
Status: COMPLETED | OUTPATIENT
Start: 2022-03-27 | End: 2022-03-27

## 2022-03-27 RX ORDER — ACETAMINOPHEN 160 MG/5ML
60 SUSPENSION ORAL EVERY 4 HOURS PRN
Status: SHIPPED | COMMUNITY
End: 2022-06-03

## 2022-03-27 RX ORDER — IBUPROFEN 200 MG
800 TABLET ORAL EVERY 6 HOURS PRN
Status: SHIPPED | COMMUNITY
End: 2022-06-03

## 2022-03-27 RX ADMIN — SODIUM CHLORIDE, POTASSIUM CHLORIDE, SODIUM LACTATE AND CALCIUM CHLORIDE 1000 ML: 600; 310; 30; 20 INJECTION, SOLUTION INTRAVENOUS at 17:10

## 2022-03-27 RX ADMIN — KETAMINE HYDROCHLORIDE 25 MG: 10 INJECTION, SOLUTION INTRAMUSCULAR; INTRAVENOUS at 17:10

## 2022-03-27 RX ADMIN — HYDROMORPHONE HYDROCHLORIDE 1 MG: 1 INJECTION, SOLUTION INTRAMUSCULAR; INTRAVENOUS; SUBCUTANEOUS at 20:54

## 2022-03-27 RX ADMIN — KETAMINE HYDROCHLORIDE 25 MG: 10 INJECTION, SOLUTION INTRAMUSCULAR; INTRAVENOUS at 18:16

## 2022-03-27 RX ADMIN — ONDANSETRON 4 MG: 2 INJECTION INTRAMUSCULAR; INTRAVENOUS at 19:39

## 2022-03-27 RX ADMIN — DIAZEPAM 2.5 MG: 5 INJECTION, SOLUTION INTRAMUSCULAR; INTRAVENOUS at 22:41

## 2022-03-27 RX ADMIN — HYDROMORPHONE HYDROCHLORIDE 1 MG: 1 INJECTION, SOLUTION INTRAMUSCULAR; INTRAVENOUS; SUBCUTANEOUS at 19:39

## 2022-03-27 RX ADMIN — KETOROLAC TROMETHAMINE 30 MG: 30 INJECTION, SOLUTION INTRAMUSCULAR at 22:03

## 2022-03-27 RX ADMIN — DIAZEPAM 5 MG: 5 INJECTION, SOLUTION INTRAMUSCULAR; INTRAVENOUS at 22:03

## 2022-03-27 ASSESSMENT — PAIN DESCRIPTION - PAIN TYPE
TYPE: ACUTE PAIN

## 2022-03-27 ASSESSMENT — FIBROSIS 4 INDEX: FIB4 SCORE: 0.35

## 2022-03-27 NOTE — ED PROVIDER NOTES
ED Provider Note    CHIEF COMPLAINT  Chief Complaint   Patient presents with   • Abdominal Pain     Right low quadrant pain started 2 weeks ago.        HPI  Katherine Carlos is a 36 y.o. female who presents with abdominal pain.  Is in the right lower quadrant/inguinal area.  It is better when she is laying down is worse when she is moving walking may be bending over times.  Its not getting better with Tylenol or ibuprofen.  Stenosis with dysuria urgency frequency vomiting diarrhea.    This pelvic pain has been going on for about 2 weeks.  She states that she noticed a bump down there in the groin area.  She then was taking pain for.  She was seen in the ER she thought they thought that she may be having GI bleeding guaiac was negative she then saw her doctor had an ultrasound done that was negative an MRI which is done on Friday results pending.      REVIEW OF SYSTEMS  General no fever chills pulmonary shortness of breath GI see above  see above GYN of discharge dermatologic no rashes        PAST MEDICAL HISTORY  Past Medical History:   Diagnosis Date   • ASTHMA     medications not needed   • FHx: cleft palate 2010   • FHx: cleft palate 2010    Mother, brother, uncle (maternal), and aunt (maternal) with cleft palate    • GBS (group B Streptococcus carrier), +RV culture, currently pregnant 2010   • History of  2010   • History of COVID-19 2021, 2022   • History of macrosomia in infant in prior pregnancy, currently pregnant 2010   • History of macrosomia in infant in prior pregnancy, currently pregnant 2010    9lb5oz    • Hypoglycemia     at 16 years old   • Previous  section- desires Repeat 2010   • SUPERVISION OF HIGH-RISK PREGNANCY 2010   • SUPERVISION OF HIGH-RISK PREGNANCY 2010       FAMILY HISTORY  Family History   Problem Relation Age of Onset   • Lupus Mother    • Heart Disease Mother    • Diabetes Father    •  Heart Disease Father    • Diabetes Brother    • Heart Disease Maternal Uncle         MI   • Diabetes Maternal Grandfather    • Parkinson's Disease Paternal Grandmother    • Diabetes Paternal Grandmother    • Cancer Sister         cervical cancer   • Alcohol abuse Paternal Aunt    • Heart Disease Paternal Grandfather        SOCIAL HISTORY  Social History     Socioeconomic History   • Marital status:    • Number of children: 1   • Highest education level: Bachelor's degree (e.g., BA, AB, BS)   Tobacco Use   • Smoking status: Never Smoker   • Smokeless tobacco: Never Used   Vaping Use   • Vaping Use: Never used   Substance and Sexual Activity   • Alcohol use: Never   • Drug use: Never     Comment:  CBD    • Sexual activity: Yes     Partners: Male   Social History Narrative    ** Merged History Encounter **          Social Determinants of Health     Financial Resource Strain: Low Risk    • Difficulty of Paying Living Expenses: Not hard at all   Food Insecurity: No Food Insecurity   • Worried About Running Out of Food in the Last Year: Never true   • Ran Out of Food in the Last Year: Never true   Transportation Needs: No Transportation Needs   • Lack of Transportation (Medical): No   • Lack of Transportation (Non-Medical): No   Physical Activity: Insufficiently Active   • Days of Exercise per Week: 3 days   • Minutes of Exercise per Session: 30 min   Stress: Stress Concern Present   • Feeling of Stress : Very much   Social Connections: Moderately Isolated   • Frequency of Communication with Friends and Family: Once a week   • Frequency of Social Gatherings with Friends and Family: Once a week   • Attends Confucianist Services: Never   • Active Member of Clubs or Organizations: Yes   • Attends Club or Organization Meetings: More than 4 times per year   • Marital Status:    Housing Stability: Low Risk    • Unable to Pay for Housing in the Last Year: No   • Number of Places Lived in the Last Year: 1   • Unstable  "Housing in the Last Year: No       SURGICAL HISTORY  Past Surgical History:   Procedure Laterality Date   • HYSTERECTOMY LAPAROSCOPY  07/09/2021    still has ovaries   • CHOLECYSTECTOMY  10/2020   • REPEAT C SECTION W TUBAL LIGATION  9/9/2010    Performed by JF DOHERTY at [facility]   • PRIMARY C SECTION  2008   • TONSILLECTOMY  at age 5       CURRENT MEDICATIONS  Home Medications    **Home medications have not yet been reviewed for this encounter**          ALLERGIES  Allergies   Allergen Reactions   • Chocolate Hives, Diarrhea and Itching     tongue itching     • Citrus Nausea and Hives   • Latex Hives and Unspecified     Skin burns.     • Peanut Oil Itching     itching     • Tomato Itching     Itchy tongue     • Milk Protein Extract      Pt reports that she gets gassy    • Acetaminophen Nausea     \"Upset stomach when taken on empty stomach\"   • Morphine Vomiting       PHYSICAL EXAM  VITAL SIGNS: BP (!) 163/71   Pulse 88   Temp 37.4 °C (99.3 °F) (Temporal)   Resp 18   Ht 1.651 m (5' 5\")   Wt (!) 127 kg (281 lb 1.4 oz)   LMP 04/09/2012   SpO2 98%   BMI 46.78 kg/m²   Constitutional: Well developed, Well nourished, No acute distress, Non-toxic appearance.   HENT: Normocephalic, Atraumatic, Bilateral external ears normal, Oropharynx moist, No oral exudates, Nose normal.   Eyes: PERRLA, EOMI, Conjunctiva normal, No discharge.   Musculoskeletal: Neck has normal range of motion, No tenderness, Supple.   Lymphatic: No cervical lymphadenopathy noted.   Cardiovascular: Normal heart rate, Normal rhythm, No murmurs, No rubs, No gallops.   Thorax & Lungs: Normal breath sounds, No respiratory distress, No wheezing, No chest tenderness.   Abdomen: Nondistended nontender patient has tenderness in the inguinal area.  Upon coughing and Valsalva it appears to be almost the femoral mass.  Skin: Warm, Dry, No erythema, No rash.   : No CVA tenderness.   Psychiatric: Calm, not anxious  Neurologic: Alert & " oriented, moves all extremities equally    RADIOLOGY/PROCEDURES  US-INGUINAL HERNIA   Final Result      Negative right inguinal ultrasound.               COURSE & MEDICAL DECISION MAKING  Pertinent Labs & Imaging studies reviewed. (See chart for details)  Patient is here with significant pain and discomfort.  She is slightly tearful at times very frustrated which she has had.  At this point she states she is also been looking up things that might be cancer.  Is still unable to find out the cause.    Abdominal pain  Was like you without pathological issues been going on for quite some time she had work-up with CTs have been negative and physical exam had extensive significant pain located in the inguinal area as well as the femoral area.  And with Valsalva the patient had bulging in the femoral area on my exam with pain.  Less likely kidney infection kidney stone gynecological infection    Plan  Ultrasound exam in the hernia  IV ketamine for pain as the patient.  Have some depressive effects as well as has had bad effects with Dilaudid which makes her throw up later    Patient had ultrasound showed no definitive hernia although clinically I feel that maybe 1 there.  Should exceed 6 weeks please exquisite pain over that area when you are doing the Valsalva and the and intervention and testing.    Patient at this point is reexamined again 7:26 PM having pain that is returning she will go to Dilaudid she will get a ride home with her .    10:35 PM  Patient has been awake and alert.  The nurse noted at one time she was slightly hypoxic had going take some deep breaths.  This point patient is normal pulse ox awake and alert she continues states she is having discomfort but is improving.  We will give her 2.5 mg IV of Valium before discharge.  Short course narcotic prescription monitoring drug search done.  Patient deemed low risk no drinking or driving or take other narcotic pain medicine with this medicine.  Is a  can cause apnea or death.  I have encouraged her to call the surgeon to follow-up.  Return if symptoms worse tomorrow    FINAL IMPRESSION  1.  Right inguinal pain  2.   3.      Electronically signed by: Andrews Cadena M.D., 3/27/2022 4:51 PM

## 2022-03-28 VITALS
SYSTOLIC BLOOD PRESSURE: 148 MMHG | DIASTOLIC BLOOD PRESSURE: 71 MMHG | WEIGHT: 281.09 LBS | TEMPERATURE: 98 F | RESPIRATION RATE: 16 BRPM | HEART RATE: 87 BPM | HEIGHT: 65 IN | BODY MASS INDEX: 46.83 KG/M2 | OXYGEN SATURATION: 95 %

## 2022-03-28 NOTE — ED NOTES
Ketamine infusion completed. Pt reports rt groin pain decreased to 3/10. Resting comfortably in bed, talking on phone with spouse. Denies any needs at this time. vss.

## 2022-03-28 NOTE — ED NOTES
Pt discharged with instructions and script.  Informed consent for narcotics signed; Pt also instructed to not drink or drive while taking narcotics. Pt verbalized understanding of discharge instructions.  Pt ambulated out of ED with .

## 2022-03-28 NOTE — ED NOTES
Rounded on pt- pt states she is doing a lot better.  Pt waiting for her  to pick her up.  Up for discharge.

## 2022-03-28 NOTE — ED NOTES
Ketamine infusion completed, pt reporting decreased rt groin pain. Pt denies any needs at this time.

## 2022-03-28 NOTE — DISCHARGE INSTRUCTIONS
I recommend you see a surgeon to get evaluated for possible hernia.  Come back if symptoms worsen any time.

## 2022-03-29 ENCOUNTER — OFFICE VISIT (OUTPATIENT)
Dept: MEDICAL GROUP | Facility: PHYSICIAN GROUP | Age: 37
End: 2022-03-29
Payer: OTHER GOVERNMENT

## 2022-03-29 VITALS
TEMPERATURE: 98.8 F | SYSTOLIC BLOOD PRESSURE: 118 MMHG | HEART RATE: 88 BPM | RESPIRATION RATE: 14 BRPM | DIASTOLIC BLOOD PRESSURE: 68 MMHG | BODY MASS INDEX: 46.28 KG/M2 | HEIGHT: 65 IN | OXYGEN SATURATION: 100 % | WEIGHT: 277.8 LBS

## 2022-03-29 DIAGNOSIS — M25.551 RIGHT HIP PAIN: ICD-10-CM

## 2022-03-29 PROBLEM — R09.81 SINUS CONGESTION: Status: RESOLVED | Noted: 2022-02-03 | Resolved: 2022-03-29

## 2022-03-29 PROCEDURE — 99214 OFFICE O/P EST MOD 30 MIN: CPT | Performed by: NURSE PRACTITIONER

## 2022-03-29 RX ORDER — ONDANSETRON 4 MG/1
TABLET, ORALLY DISINTEGRATING ORAL
COMMUNITY
Start: 2022-01-17 | End: 2022-03-29

## 2022-03-29 RX ORDER — PREDNISONE 10 MG/1
TABLET ORAL
COMMUNITY
Start: 2022-01-17 | End: 2022-03-29

## 2022-03-29 RX ORDER — BENZONATATE 100 MG/1
CAPSULE ORAL
COMMUNITY
Start: 2022-01-17 | End: 2022-03-29

## 2022-03-29 ASSESSMENT — ENCOUNTER SYMPTOMS
SHORTNESS OF BREATH: 0
HEADACHES: 0
DIZZINESS: 0
TINGLING: 0
FEVER: 0
WEIGHT LOSS: 0
CHILLS: 0

## 2022-03-29 ASSESSMENT — FIBROSIS 4 INDEX: FIB4 SCORE: 0.52

## 2022-03-29 NOTE — PROGRESS NOTES
CC:  Chief Complaint   Patient presents with   • Follow-Up     Requesting cream for pain   • Orders Needed     MRI Hip       HISTORY OF PRESENT ILLNESS: Patient is a 36 y.o. female established patient presenting for follow up with hip pain      Right hip pain  Acute and uncomplicated condition   Onset 3 weeks ago  Was seen for right lower quadrant pain   Started to have issues with walking and went to [facility]  Was seen at [facility] today for xray and was told they think she has a broken hip  Father had multiple hip replacements at a young age  Pain with walking, sitting.  Since being on Crutches she has had improvement   Denies trauma, falls        Patient Active Problem List    Diagnosis Date Noted   • Right hip pain 03/29/2022   • Right lower quadrant abdominal pain 03/22/2022   • Hepatic steatosis 03/22/2022   • Ear pain, left 02/18/2022   • Chronic post-traumatic stress disorder (PTSD) 01/11/2022   • Mild episode of recurrent major depressive disorder (HCC) 01/11/2022   • Anxiety 10/01/2021   • Acne vulgaris 10/01/2021   • History of partial hysterectomy 08/25/2021   • History of gestational diabetes 08/25/2021   • Morbid obesity with BMI of 45.0-49.9, adult (HCC) 08/25/2021      Allergies:Chocolate, Citrus, Latex, Peanut oil, Tomato, Milk protein extract, Acetaminophen, and Morphine    Current Outpatient Medications   Medication Sig Dispense Refill   • diclofenac sodium (VOLTAREN) 1 % Gel Apply 2 g topically 4 times a day as needed. 100 g 0   • ibuprofen (MOTRIN) 200 MG Tab Take 800 mg by mouth every 6 hours as needed for Mild Pain.     • acetaminophen (TYLENOL) 500 MG Tab Take 2,000 mg by mouth every 6 hours as needed for Moderate Pain.     • acetaminophen (TYLENOL) 160 MG/5ML Suspension Take 60 mL by mouth every four hours as needed. Indications: Pain     • traMADol (ULTRAM) 50 MG Tab Take 1 Tablet by mouth every 12 hours as needed for Moderate Pain for up to 7 days. 14 Tablet 0   • multivitamin (THERAGRAN) Tab Take 1  "Tablet by mouth every day.     • prazosin (MINIPRESS) 1 MG Cap Take 1 mg by mouth every evening.     • sertraline (ZOLOFT) 50 MG Tab Take 50 mg by mouth every evening.     • Cyanocobalamin (VITAMIN B-12 PO) Take 1 Tablet by mouth every day.     • buPROPion (WELLBUTRIN XL) 300 MG XL tablet Take 1 Tablet by mouth every morning. (Patient taking differently: Take 300 mg by mouth every evening.) 90 Tablet 3     No current facility-administered medications for this visit.       Social History     Tobacco Use   • Smoking status: Never Smoker   • Smokeless tobacco: Never Used   Vaping Use   • Vaping Use: Never used   Substance Use Topics   • Alcohol use: Never   • Drug use: Never     Comment:  CBD      Social History     Social History Narrative    ** Merged History Encounter **            Family History   Problem Relation Age of Onset   • Lupus Mother    • Heart Disease Mother    • Diabetes Father    • Heart Disease Father    • Diabetes Brother    • Heart Disease Maternal Uncle         MI   • Diabetes Maternal Grandfather    • Parkinson's Disease Paternal Grandmother    • Diabetes Paternal Grandmother    • Cancer Sister         cervical cancer   • Alcohol abuse Paternal Aunt    • Heart Disease Paternal Grandfather         Review of Systems   Constitutional: Negative for chills, fever, malaise/fatigue and weight loss.   Respiratory: Negative for shortness of breath.    Cardiovascular: Negative for chest pain.   Musculoskeletal: Positive for joint pain.        Right hip   Neurological: Negative for dizziness, tingling and headaches.             - NOTE: All other systems reviewed and are negative, except as in HPI.      Exam:    /68 (BP Location: Right arm, Patient Position: Sitting, BP Cuff Size: Large adult)   Pulse 88   Temp 37.1 °C (98.8 °F) (Temporal)   Resp 14   Ht 1.651 m (5' 5\")   Wt (!) 126 kg (277 lb 12.8 oz)   SpO2 100%  Body mass index is 46.23 kg/m².    General: Alert, pleasant, NAD  EYES:   PERRL, " EOMI, no icterus or pallor.  Conjunctivae and lids normal.   HENT:  Normocephalic.  External ears normal.   Respiratory: Normal respiratory effort.   Skin: Warm, dry, no rashes.  Musculoskeletal: using crutches for ambulation due to pain in right hip   Extremities: No clubbing, cyanosis or edema noted.   Neurological: No tremors, sensation grossly intact, tone/strength normal,using crutches for ambulation, CN2-12 intact.  Psych:  Affect/mood is normal, judgement is good, memory is intact, grooming is appropriate.      Assessment/Plan:  1. Right hip pain  - diclofenac sodium (VOLTAREN) 1 % Gel; Apply 2 g topically 4 times a day as needed.  Dispense: 100 g; Refill: 0   pending reading on xray   MRI was ordered by [facility]  Pending note from [facility]    Discussed with patient possible alternative diagnoses, patient is to take all medications as prescribed.     If symptoms persist FU w/PCP, if symptoms worsen go to emergency room.     If experiencing any side effects from prescribed medications reports to the office immediately or go to emergency room.    Reviewed indication, dosage, usage and potential adverse effects of prescribed medications.     Reviewed risks and benefits of treatment plan. Patient verbalizes understanding of all instruction and verbally agrees to plan.      Return for pending imaing results and [facility] consults.    My total time spent caring for the patient on the day of the encounter was 22 minutes.   This does not include time spent on separately billable procedures/tests.     Please note that this dictation was created using voice recognition software. I have made every reasonable attempt to correct obvious errors, but I expect that there are errors of grammar and possibly content that I did not discover before finalizing the note.

## 2022-03-29 NOTE — ASSESSMENT & PLAN NOTE
Acute and uncomplicated condition   Onset 3 weeks ago  Was seen for right lower quadrant pain   Started to have issues with walking and went to [facility]  Was seen at [facility] today for xray and was told they think she has a broken hip  Father had multiple hip replacements at a young age  Pain with walking, sitting.  Since being on Crutches she has had improvement   Denies trauma, falls

## 2022-03-31 ENCOUNTER — APPOINTMENT (OUTPATIENT)
Dept: RADIOLOGY | Facility: MEDICAL CENTER | Age: 37
End: 2022-03-31
Attending: INTERNAL MEDICINE
Payer: COMMERCIAL

## 2022-03-31 ENCOUNTER — HOSPITAL ENCOUNTER (OUTPATIENT)
Dept: RADIOLOGY | Facility: MEDICAL CENTER | Age: 37
End: 2022-03-31
Attending: PHYSICIAN ASSISTANT
Payer: OTHER GOVERNMENT

## 2022-03-31 DIAGNOSIS — M25.551 ACUTE HIP PAIN, RIGHT: ICD-10-CM

## 2022-03-31 PROCEDURE — 73721 MRI JNT OF LWR EXTRE W/O DYE: CPT | Mod: RT

## 2022-04-28 ENCOUNTER — TELEPHONE (OUTPATIENT)
Dept: MEDICAL GROUP | Facility: PHYSICIAN GROUP | Age: 37
End: 2022-04-28
Payer: COMMERCIAL

## 2022-04-28 NOTE — TELEPHONE ENCOUNTER
Called and left message on patient's voicemail. Appt is needed for Rheumatology referral and Rukhsana needs to evaluate swelling. MD

## 2022-04-28 NOTE — TELEPHONE ENCOUNTER
1. Caller Name: Katherine Carlos                        Call Back Number: [phone]      How would the patient prefer to be contacted with a response: triptaphart message    2. SPECIFIC Action To Be Taken: Referral pending, please sign.    3. Diagnosis/Clinical Reason for Request: inflamation.    4. Specialty & Provider Name/Lab/Imaging Location: Rheumatologist    5. Is appointment scheduled for requested order/referral: no    Patient was not informed they will receive a return phone call from the office ONLY if there are any questions before processing their request. Advised to call back if they haven't received a call from the referral department in 5 days.

## 2022-06-02 ENCOUNTER — APPOINTMENT (OUTPATIENT)
Dept: MEDICAL GROUP | Facility: PHYSICIAN GROUP | Age: 37
End: 2022-06-02

## 2022-06-03 ENCOUNTER — TELEMEDICINE (OUTPATIENT)
Dept: MEDICAL GROUP | Facility: PHYSICIAN GROUP | Age: 37
End: 2022-06-03
Payer: OTHER GOVERNMENT

## 2022-06-03 VITALS — HEIGHT: 62 IN | WEIGHT: 275 LBS | BODY MASS INDEX: 50.61 KG/M2

## 2022-06-03 DIAGNOSIS — E66.01 CLASS 3 SEVERE OBESITY DUE TO EXCESS CALORIES WITHOUT SERIOUS COMORBIDITY WITH BODY MASS INDEX (BMI) OF 50.0 TO 59.9 IN ADULT (HCC): ICD-10-CM

## 2022-06-03 DIAGNOSIS — F43.12 CHRONIC POST-TRAUMATIC STRESS DISORDER (PTSD): ICD-10-CM

## 2022-06-03 DIAGNOSIS — F33.0 MILD EPISODE OF RECURRENT MAJOR DEPRESSIVE DISORDER (HCC): ICD-10-CM

## 2022-06-03 DIAGNOSIS — F41.9 ANXIETY: ICD-10-CM

## 2022-06-03 DIAGNOSIS — J45.20 MILD INTERMITTENT ASTHMA WITHOUT COMPLICATION: ICD-10-CM

## 2022-06-03 DIAGNOSIS — J30.2 SEASONAL ALLERGIES: ICD-10-CM

## 2022-06-03 PROBLEM — E66.813 CLASS 3 SEVERE OBESITY DUE TO EXCESS CALORIES WITHOUT SERIOUS COMORBIDITY WITH BODY MASS INDEX (BMI) OF 50.0 TO 59.9 IN ADULT (HCC): Status: ACTIVE | Noted: 2021-08-25

## 2022-06-03 PROBLEM — J45.909 ASTHMA: Status: ACTIVE | Noted: 2022-05-23

## 2022-06-03 PROBLEM — K81.1 CHRONIC CHOLECYSTITIS: Status: ACTIVE | Noted: 2022-06-03

## 2022-06-03 PROCEDURE — 99213 OFFICE O/P EST LOW 20 MIN: CPT | Mod: 95 | Performed by: NURSE PRACTITIONER

## 2022-06-03 RX ORDER — ALBUTEROL SULFATE 90 UG/1
AEROSOL, METERED RESPIRATORY (INHALATION)
COMMUNITY
End: 2022-08-19 | Stop reason: SDUPTHER

## 2022-06-03 RX ORDER — AZELASTINE 1 MG/ML
1 SPRAY, METERED NASAL 2 TIMES DAILY
Qty: 30 ML | Refills: 1 | Status: SHIPPED | OUTPATIENT
Start: 2022-06-03 | End: 2022-11-15

## 2022-06-03 RX ORDER — AZELASTINE 1 MG/ML
SPRAY, METERED NASAL
COMMUNITY
End: 2022-06-03

## 2022-06-03 RX ORDER — BUPROPION HYDROCHLORIDE 300 MG/1
300 TABLET ORAL
COMMUNITY
Start: 2022-05-26 | End: 2022-06-03

## 2022-06-03 RX ORDER — FLUTICASONE PROPIONATE 50 MCG
SPRAY, SUSPENSION (ML) NASAL
COMMUNITY
End: 2022-06-03

## 2022-06-03 RX ORDER — IBUPROFEN 200 MG
800 TABLET ORAL
COMMUNITY
End: 2022-06-03

## 2022-06-03 ASSESSMENT — FIBROSIS 4 INDEX: FIB4 SCORE: 0.52

## 2022-06-03 NOTE — PROGRESS NOTES
Virtual Visit: Established Patient   This visit was conducted via Zoom using secure and encrypted videoconferencing technology.   The patient was in their home in the state of [place].    The patient's identity was confirmed and verbal consent was obtained for this virtual visit.     Subjective:   CC:   Chief Complaint   Patient presents with   • Paperwork     To support weight loss surgery       Katherine Carlos is a 36 y.o. female presenting for evaluation and management of:    1. Class 3 severe obesity due to excess calories without serious comorbidity with body mass index (BMI) of 50.0 to 59.9 in adult (HCC)  Patient needing visit today for paperwork stating that I agree with her getting a gastric bypass.  We discussed this and not medical clearance suggest me supporting that she plans to get this completed.  Patient is in a program currently with [facility] and has been working on weight loss and plans to have a total of 16 pound weight loss by July 25.  Patient currently has lost a total of 7 pounds with diet, exercise.  Patient has noticed that with the weight loss that she is actually experiencing improvement in her hip pain, Depression, anxiety    ROS   See HPI    Current medicines (including changes today)  Current Outpatient Medications   Medication Sig Dispense Refill   • albuterol 108 (90 Base) MCG/ACT Aero Soln inhalation aerosol albuterol sulfate HFA 90 mcg/actuation aerosol inhaler     • sertraline (ZOLOFT) 50 MG Tab Take 50 mg by mouth every day.     • azelastine (ASTELIN) 137 MCG/SPRAY nasal spray Administer 1 Spray into affected nostril(S) 2 times a day. 30 mL 1   • multivitamin (THERAGRAN) Tab Take 1 Tablet by mouth every day.     • Cyanocobalamin (VITAMIN B-12 PO) Take 1 Tablet by mouth every day.     • buPROPion (WELLBUTRIN XL) 300 MG XL tablet Take 1 Tablet by mouth every morning. (Patient taking differently: Take 300 mg by mouth every evening.) 90 Tablet 3     No current  "facility-administered medications for this visit.       Patient Active Problem List    Diagnosis Date Noted   • Chronic cholecystitis 06/03/2022   • Asthma 05/23/2022   • Seasonal allergies 05/23/2022   • Right hip pain 03/29/2022   • Right lower quadrant abdominal pain 03/22/2022   • Hepatic steatosis 03/22/2022   • Ear pain, left 02/18/2022   • Chronic post-traumatic stress disorder (PTSD) 01/11/2022   • Mild episode of recurrent major depressive disorder (HCC) 01/11/2022   • Anxiety 10/01/2021   • Acne vulgaris 10/01/2021   • History of partial hysterectomy 08/25/2021   • History of gestational diabetes 08/25/2021   • Class 3 severe obesity due to excess calories without serious comorbidity with body mass index (BMI) of 50.0 to 59.9 in adult (HCC) 08/25/2021        Objective:   Ht 1.575 m (5' 2\")   Wt 125 kg (275 lb)   LMP 04/09/2012   BMI 50.30 kg/m²     Physical Exam:  Constitutional: Alert, no distress, well-groomed.  Skin: No rashes in visible areas.  Eye: Round. Conjunctiva clear, lids normal. No icterus.   ENMT: Lips pink without lesions, good dentition, moist mucous membranes. Phonation normal.  Neck: No masses, no thyromegaly. Moves freely without pain.  Respiratory: Unlabored respiratory effort, no cough or audible wheeze  Psych: Alert and oriented x3, normal affect and mood.     Assessment and Plan:   The following treatment plan was discussed:     Chronic post-traumatic stress disorder (PTSD)  Following with behavioral health   Seems to be well controlled   Has created boundaries with certain people in her life which she feels has been a huge improvement   Currently taking sertraline and wellbutrin    Asthma  Patient currently uses albuterol as needed for exacerbations   Controlled  She is possibly planning to get gastric surgery the next few months may need medical clearance from pulmonology we will follow    Anxiety  Continues to follow with behavioral health   Currently taking sertraline and " Wellbutrin  Feels well controlled  Plans to get gastric surgery in the next few months and is feeling excited    Mild episode of recurrent major depressive disorder (HCC)  wellbutrin and sertraline    Class 3 severe obesity due to excess calories without serious comorbidity with body mass index (BMI) of 50.0 to 59.9 in adult ([facility])  Patient states she weight 275lb  Has been following with Dr. Acharya from [facility]  Planning on getting gastric weight loss surgery with them  Has noted a 7 lb wt loss  Stopped sodas, starbucks drinks, carbs  Requesting paperwork to be filled out for gastric surgery  Is currently in a wt loss program with [facility]  Has until July 25th to loose a total of 16lb       1. Seasonal allergies  azelastine (ASTELIN) 137 MCG/SPRAY nasal spray   2. Chronic post-traumatic stress disorder (PTSD)     3. Mild intermittent asthma without complication     4. Anxiety     5. Mild episode of recurrent major depressive disorder (HCC)     6. Class 3 severe obesity due to excess calories without serious comorbidity with body mass index (BMI) of 50.0 to 59.9 in adult ([facility])         Follow-up: Return for As needed for procedure.

## 2022-06-03 NOTE — ASSESSMENT & PLAN NOTE
Following with behavioral health   Seems to be well controlled   Has created boundaries with certain people in her life which she feels has been a huge improvement   Currently taking sertraline and wellbutrin

## 2022-06-03 NOTE — ASSESSMENT & PLAN NOTE
Patient currently uses albuterol as needed for exacerbations   Controlled  She is possibly planning to get gastric surgery the next few months may need medical clearance from pulmonology we will follow

## 2022-06-03 NOTE — ASSESSMENT & PLAN NOTE
Patient states she weight 275lb  Has been following with Dr. Acharya from [facility]  Planning on getting gastric weight loss surgery with them  Has noted a 7 lb wt loss  Stopped sodas, starbucks drinks, carbs  Requesting paperwork to be filled out for gastric surgery  Is currently in a wt loss program with [facility]  Has until July 25th to loose a total of 16lb

## 2022-06-03 NOTE — ASSESSMENT & PLAN NOTE
Continues to follow with behavioral health   Currently taking sertraline and Wellbutrin  Feels well controlled  Plans to get gastric surgery in the next few months and is feeling excited

## 2022-06-21 ENCOUNTER — HOSPITAL ENCOUNTER (OUTPATIENT)
Dept: RADIOLOGY | Facility: MEDICAL CENTER | Age: 37
End: 2022-06-21
Attending: SURGERY
Payer: OTHER GOVERNMENT

## 2022-06-21 DIAGNOSIS — K21.00 GASTROESOPHAGEAL REFLUX DISEASE WITH ESOPHAGITIS, UNSPECIFIED WHETHER HEMORRHAGE: ICD-10-CM

## 2022-06-21 PROCEDURE — 74240 X-RAY XM UPR GI TRC 1CNTRST: CPT

## 2022-08-19 ENCOUNTER — TELEMEDICINE (OUTPATIENT)
Dept: MEDICAL GROUP | Facility: PHYSICIAN GROUP | Age: 37
End: 2022-08-19
Payer: OTHER GOVERNMENT

## 2022-08-19 VITALS — HEIGHT: 62 IN | WEIGHT: 271 LBS | TEMPERATURE: 100.2 F | BODY MASS INDEX: 49.87 KG/M2

## 2022-08-19 DIAGNOSIS — R68.89 FLU-LIKE SYMPTOMS: ICD-10-CM

## 2022-08-19 DIAGNOSIS — J45.20 MILD INTERMITTENT ASTHMA WITHOUT COMPLICATION: ICD-10-CM

## 2022-08-19 DIAGNOSIS — M54.50 ACUTE BILATERAL LOW BACK PAIN WITHOUT SCIATICA: ICD-10-CM

## 2022-08-19 PROCEDURE — 99214 OFFICE O/P EST MOD 30 MIN: CPT | Mod: 95 | Performed by: NURSE PRACTITIONER

## 2022-08-19 RX ORDER — TIZANIDINE 4 MG/1
4 TABLET ORAL EVERY 6 HOURS PRN
Qty: 15 TABLET | Refills: 0 | Status: SHIPPED | OUTPATIENT
Start: 2022-08-19 | End: 2022-11-15

## 2022-08-19 RX ORDER — DICLOFENAC SODIUM 75 MG/1
TABLET, DELAYED RELEASE ORAL
COMMUNITY
End: 2022-11-15

## 2022-08-19 RX ORDER — BENZONATATE 100 MG/1
100 CAPSULE ORAL 3 TIMES DAILY PRN
Qty: 60 CAPSULE | Refills: 0 | Status: SHIPPED | OUTPATIENT
Start: 2022-08-19 | End: 2022-11-15

## 2022-08-19 RX ORDER — BUPROPION HYDROCHLORIDE 300 MG/1
300 TABLET ORAL
COMMUNITY
Start: 2022-08-17 | End: 2022-11-15

## 2022-08-19 RX ORDER — ALBUTEROL SULFATE 90 UG/1
2 AEROSOL, METERED RESPIRATORY (INHALATION) EVERY 6 HOURS PRN
Qty: 8.5 G | Refills: 3 | Status: SHIPPED | OUTPATIENT
Start: 2022-08-19 | End: 2022-08-30 | Stop reason: SDUPTHER

## 2022-08-19 RX ORDER — PRAZOSIN HYDROCHLORIDE 1 MG/1
1 CAPSULE ORAL
COMMUNITY
Start: 2022-08-17 | End: 2023-07-31

## 2022-08-19 ASSESSMENT — FIBROSIS 4 INDEX: FIB4 SCORE: 0.52

## 2022-08-19 NOTE — PATIENT INSTRUCTIONS
Discussed supportive care in detail:   Rest  Increase fluid intake, add lemon and honey   Take OTC vit C and Zinc.   Incorporate Nasal irrigation or a Neti-pot (if sinuses involved).   Use humidifier in room or hot shower/bath.   Vicks Vapor rub steam brambila      Meds:  May use any combination of the following over-the-counter medications per 's instructions:    Nasal congestion/runny nose  - nasal saline sprays (ocean spray)  - nasal steroid sprays (e.g. Flonase, Nasacort)  - oral daily antihistamine (e.g. Claritin, Zyrtec, Allegra)  - oral decongestant (e.g. Sudafed)    Pain reliever, fever reducer  - oral pain reliever (e.g. Tylenol)  - oral anti-inflammatory/pain reliever (NSAID) (e.g. Motrin, Advil)- as directed on back of box    Cough  - as an expectorant/mucus reliever (Mucinex, Robitussin, etc).   - cough suppressant - (Tessalon Perls Robitussin, Delsym)  -as well as OTC lozenges for cough (Ricola, Halls etc).    Sore throat  Cepacol lozenges  Throat gargles with saline solution:  To mix your own solution:   --Add distilled water to the bottle's fill line at 240 ml = 8 fl oz  --Add 1/2 teaspoon of salt  --Warm in a microwave by 5 second increments.  --Test on your wrist for lukewarm temperature.

## 2022-08-19 NOTE — ASSESSMENT & PLAN NOTE
Acute uncomplicated condition   Onset Monday with chills, HA, sneezing, flushed and was sent home. She has been also has been feeling fatigue. Feels that symptoms are getting worse since Monday she is getting dizzy at times at home even though she is drinking fluids.   Tuesday noted increase in coughing  Wednesday she noticed she started to have some back pain that is dull and throbbing in nature with intermitten sharp pain.   States confusion due to forgetting what she was going to say.   Has be using NyQuil and aleve and tylenol for treatments.

## 2022-08-19 NOTE — LETTER
[facility]  [address] [address]     August 19, 2022    Patient: Katherine Carlos   YOB: 1985   Date of Visit: 8/19/2022       To Whom It May Concern:    Katherine Carlos was seen and treated in our department on 8/19/2022.     Sincerely,     HONEY Mcfadden.

## 2022-08-19 NOTE — PROGRESS NOTES
Virtual Visit: Established Patient   This visit was conducted via Zoom using secure and encrypted videoconferencing technology.   The patient was in their home in the state of [place].    The patient's identity was confirmed and verbal consent was obtained for this virtual visit.     Subjective:   CC:   Chief Complaint   Patient presents with    Flu Like Symptoms     X4days Chills, back pain, cough, runny nose    Medication Refill     albuterol 108        Katherine Carlos is a 36 y.o. female presenting for evaluation and management of:    Flu-like symptoms  Acute uncomplicated condition   Onset Monday with chills, HA, sneezing, flushed and was sent home. She has been also has been feeling fatigue. Feels that symptoms are getting worse since Monday she is getting dizzy at times at home even though she is drinking fluids.   Tuesday noted increase in coughing  Wednesday she noticed she started to have some back pain that is dull and throbbing in nature with intermitten sharp pain.   States confusion due to forgetting what she was going to say.   Has be using NyQuil and aleve and tylenol for treatments.      ROS   See HPI    Current medicines (including changes today)  Current Outpatient Medications   Medication Sig Dispense Refill    buPROPion (WELLBUTRIN XL) 300 MG XL tablet Take 300 mg by mouth.      prazosin (MINIPRESS) 1 MG Cap Take 1 mg by mouth.      benzonatate (TESSALON) 100 MG Cap Take 1 Capsule by mouth 3 times a day as needed for Cough. 60 Capsule 0    tizanidine (ZANAFLEX) 4 MG Tab Take 1 Tablet by mouth every 6 hours as needed (back pain). 15 Tablet 0    albuterol 108 (90 Base) MCG/ACT Aero Soln inhalation aerosol Inhale 2 Puffs every 6 hours as needed for Shortness of Breath. 8.5 g 3    sertraline (ZOLOFT) 50 MG Tab Take 50 mg by mouth every day.      multivitamin (THERAGRAN) Tab Take 1 Tablet by mouth every day.      Cyanocobalamin (VITAMIN B-12 PO) Take 1 Tablet by mouth every day.      buPROPion  "(WELLBUTRIN XL) 300 MG XL tablet Take 1 Tablet by mouth every morning. (Patient taking differently: Take 300 mg by mouth every evening.) 90 Tablet 3    diclofenac DR (VOLTAREN) 75 MG Tablet Delayed Response diclofenac sodium 75 mg tablet,delayed release (Patient not taking: Reported on 8/19/2022)      azelastine (ASTELIN) 137 MCG/SPRAY nasal spray Administer 1 Spray into affected nostril(S) 2 times a day. (Patient not taking: Reported on 8/19/2022) 30 mL 1     No current facility-administered medications for this visit.       Patient Active Problem List    Diagnosis Date Noted    Flu-like symptoms 08/19/2022    Chronic cholecystitis 06/03/2022    Asthma 05/23/2022    Seasonal allergies 05/23/2022    Right hip pain 03/29/2022    Right lower quadrant abdominal pain 03/22/2022    Hepatic steatosis 03/22/2022    Ear pain, left 02/18/2022    Chronic post-traumatic stress disorder (PTSD) 01/11/2022    Mild episode of recurrent major depressive disorder (HCC) 01/11/2022    Anxiety 10/01/2021    Acne vulgaris 10/01/2021    History of partial hysterectomy 08/25/2021    History of gestational diabetes 08/25/2021    Class 3 severe obesity due to excess calories without serious comorbidity with body mass index (BMI) of 50.0 to 59.9 in adult ([facility]) 08/25/2021        Objective:   Temp 37.9 °C (100.2 °F) (Temporal)   Ht 1.575 m (5' 2\")   Wt 123 kg (271 lb)   LMP 04/09/2012   BMI 49.57 kg/m²     Physical Exam:  Constitutional: Alert, no distress, well-groomed.  Skin: No rashes in visible areas.  Eye: Round. Conjunctiva clear, lids normal. No icterus.   ENMT: Lips pink without lesions, good dentition, moist mucous membranes. Phonation normal.  Neck: No masses, no thyromegaly. Moves freely without pain.  Respiratory: Unlabored respiratory effort, no cough or audible wheeze  Psych: Alert and oriented x3, normal affect and mood.     Assessment and Plan:   The following treatment plan was discussed:     1. Flu-like symptoms  - " benzonatate (TESSALON) 100 MG Cap; Take 1 Capsule by mouth 3 times a day as needed for Cough.  Dispense: 60 Capsule; Refill: 0  - tizanidine (ZANAFLEX) 4 MG Tab; Take 1 Tablet by mouth every 6 hours as needed (back pain).  Dispense: 15 Tablet; Refill: 0  Discussed with patient supportive care for flulike symptoms, upper respiratory virus.  At this time due to her symptom onset she is too late for any type of Tamiflu and we will not test at this time.   As she was negative for COVID we will not start her on any paxlovid    2. Acute bilateral low back pain without sciatica  - URINALYSIS,CULTURE IF INDICATED; Future    3. Mild intermittent asthma without complication  - albuterol 108 (90 Base) MCG/ACT Aero Soln inhalation aerosol; Inhale 2 Puffs every 6 hours as needed for Shortness of Breath.  Dispense: 8.5 g; Refill: 3      Follow-up: Return in about 1 week (around 8/26/2022) for follow up as needed .

## 2022-08-24 ENCOUNTER — TELEPHONE (OUTPATIENT)
Dept: MEDICAL GROUP | Facility: PHYSICIAN GROUP | Age: 37
End: 2022-08-24
Payer: COMMERCIAL

## 2022-08-24 NOTE — TELEPHONE ENCOUNTER
Patient called regarding recent doctors note PCP wrote for her. She states her job did not accept the note because it did not have the dates she was off work, she states it has to have the dates 8/16/22-8/24/22. She needs this letter today since she is going back to work tomorrow.

## 2022-08-30 DIAGNOSIS — R68.89 FLU-LIKE SYMPTOMS: ICD-10-CM

## 2022-08-30 DIAGNOSIS — J45.20 MILD INTERMITTENT ASTHMA WITHOUT COMPLICATION: ICD-10-CM

## 2022-08-31 ENCOUNTER — PATIENT MESSAGE (OUTPATIENT)
Dept: MEDICAL GROUP | Facility: PHYSICIAN GROUP | Age: 37
End: 2022-08-31
Payer: COMMERCIAL

## 2022-08-31 RX ORDER — TIZANIDINE 4 MG/1
4 TABLET ORAL EVERY 6 HOURS PRN
Qty: 90 TABLET | Refills: 0 | OUTPATIENT
Start: 2022-08-31

## 2022-08-31 RX ORDER — ALBUTEROL SULFATE 90 UG/1
2 AEROSOL, METERED RESPIRATORY (INHALATION) EVERY 6 HOURS PRN
Qty: 8.5 G | Refills: 3 | Status: SHIPPED | OUTPATIENT
Start: 2022-08-31

## 2022-11-15 ENCOUNTER — OFFICE VISIT (OUTPATIENT)
Dept: MEDICAL GROUP | Facility: PHYSICIAN GROUP | Age: 37
End: 2022-11-15
Payer: COMMERCIAL

## 2022-11-15 VITALS
HEIGHT: 62 IN | BODY MASS INDEX: 51.27 KG/M2 | DIASTOLIC BLOOD PRESSURE: 72 MMHG | RESPIRATION RATE: 18 BRPM | OXYGEN SATURATION: 95 % | HEART RATE: 97 BPM | WEIGHT: 278.6 LBS | TEMPERATURE: 97.7 F | SYSTOLIC BLOOD PRESSURE: 128 MMHG

## 2022-11-15 DIAGNOSIS — G47.33 OBSTRUCTIVE SLEEP APNEA SYNDROME: ICD-10-CM

## 2022-11-15 DIAGNOSIS — Z00.00 ANNUAL PHYSICAL EXAM: ICD-10-CM

## 2022-11-15 PROCEDURE — 99213 OFFICE O/P EST LOW 20 MIN: CPT | Performed by: NURSE PRACTITIONER

## 2022-11-15 RX ORDER — ESTRADIOL 2 MG/1
2 TABLET ORAL DAILY
COMMUNITY
Start: 2022-11-07 | End: 2023-10-13

## 2022-11-15 ASSESSMENT — FIBROSIS 4 INDEX: FIB4 SCORE: 0.52

## 2022-11-16 NOTE — ASSESSMENT & PLAN NOTE
Chronic and exacerbated condition   States in the past she had been tested and noted it did not go well   She states now she is snoring more and louder  She also notes that she gets leg jerking  Her  is at visit is states they do not sleep in the same room due to her waking him throughout the night

## 2022-11-16 NOTE — PROGRESS NOTES
CC:  Chief Complaint   Patient presents with    Sleep Problem    Referral Needed       HISTORY OF PRESENT ILLNESS: Patient is a 36 y.o. female established patient presenting     Obstructive sleep apnea syndrome  Chronic and exacerbated condition   States in the past she had been tested and noted it did not go well   She states now she is snoring more and louder  She also notes that she gets leg jerking  Her  is at visit is states they do not sleep in the same room due to her waking him throughout the night        Allergies:Chocolate, Citrus, Latex, Peanut oil, Tomato, Milk protein extract, Acetaminophen, and Morphine    Current Outpatient Medications   Medication Sig Dispense Refill    estradiol (ESTRACE) 2 MG Tab Take 1 Tablet by mouth every day.      albuterol 108 (90 Base) MCG/ACT Aero Soln inhalation aerosol Inhale 2 Puffs every 6 hours as needed for Shortness of Breath. 8.5 g 3    prazosin (MINIPRESS) 1 MG Cap Take 1 Capsule by mouth.      sertraline (ZOLOFT) 50 MG Tab Take 1 Tablet by mouth every day.      multivitamin (THERAGRAN) Tab Take 1 Tablet by mouth every day.      Cyanocobalamin (VITAMIN B-12 PO) Take 1 Tablet by mouth every day.      buPROPion (WELLBUTRIN XL) 300 MG XL tablet Take 1 Tablet by mouth every morning. (Patient taking differently: Take 1 Tablet by mouth every evening.) 90 Tablet 3    Non Formulary Request DHEA 25mg       No current facility-administered medications for this visit.     Past Medical History:   Diagnosis Date    ASTHMA     medications not needed    FHx: cleft palate 2010    FHx: cleft palate 2010    Mother, brother, uncle (maternal), and aunt (maternal) with cleft palate     GBS (group B Streptococcus carrier), +RV culture, currently pregnant 2010    History of  2010    History of COVID-19 2021, 2022    History of macrosomia in infant in prior pregnancy, currently pregnant 2010    History of macrosomia in  "infant in prior pregnancy, currently pregnant 2010    9lb5oz     Hypoglycemia     at 16 years old    Previous  section- desires Repeat 2010    SUPERVISION OF HIGH-RISK PREGNANCY 2010    SUPERVISION OF HIGH-RISK PREGNANCY 2010     Past Surgical History:   Procedure Laterality Date    HYSTERECTOMY LAPAROSCOPY  2021    still has ovaries    CHOLECYSTECTOMY  10/2020    REPEAT C SECTION W TUBAL LIGATION  2010    Performed by JF DOHERTY at [facility]    PRIMARY C SECTION  2008    TONSILLECTOMY  at age 5     Social History     Tobacco Use    Smoking status: Never    Smokeless tobacco: Never   Vaping Use    Vaping Use: Never used   Substance Use Topics    Alcohol use: Never    Drug use: Never     Comment:  CBD      Social History     Social History Narrative    ** Merged History Encounter **            Family History   Problem Relation Age of Onset    Lupus Mother     Heart Disease Mother     Diabetes Father     Heart Disease Father     Diabetes Brother     Heart Disease Maternal Uncle         MI    Diabetes Maternal Grandfather     Parkinson's Disease Paternal Grandmother     Diabetes Paternal Grandmother     Cancer Sister         cervical cancer    Alcohol abuse Paternal Aunt     Heart Disease Paternal Grandfather         ROS    See HPI      - NOTE: All other systems reviewed and are negative, except as in HPI.      Exam:    /72 (BP Location: Left arm, Patient Position: Sitting, BP Cuff Size: Adult)   Pulse 97   Temp 36.5 °C (97.7 °F) (Temporal)   Resp 18   Ht 1.575 m (5' 2\")   Wt (!) 126 kg (278 lb 9.6 oz)   SpO2 95%  Body mass index is 50.96 kg/m².    General: Alert, pleasant, NAD  EYES:   PERRL, EOMI, no icterus or pallor.  Conjunctivae and lids normal.   HENT:  Normocephalic.  External ears normal.   Skin: Warm, dry, no rashes.  Musculoskeletal: Gait is normal.  Moves all extremities well.    Extremities: No clubbing, cyanosis or edema noted. "   Neurological: No tremors, sensation grossly intact, tone/strength normal, gait is normal.  Psych:  Affect/mood is normal, judgement is good, memory is intact, grooming is appropriate.      Assessment/Plan:    1. Obstructive sleep apnea syndrome  - Referral to Pulmonary and Sleep Medicine    2. Annual physical exam  - CBC WITHOUT DIFFERENTIAL; Future  - Comp Metabolic Panel; Future  - Lipid Profile; Future       Discussed with patient possible alternative diagnoses, patient is to take all medications as prescribed.     If symptoms persist FU w/PCP, if symptoms worsen go to emergency room.     If experiencing any side effects from prescribed medications reports to the office immediately or go to emergency room.    Reviewed indication, dosage, usage and potential adverse effects of prescribed medications.     Reviewed risks and benefits of treatment plan. Patient verbalizes understanding of all instruction and verbally agrees to plan.      Return for pending labs.      Please note that this dictation was created using voice recognition software. I have made every reasonable attempt to correct obvious errors, but I expect that there are errors of grammar and possibly content that I did not discover before finalizing the note.

## 2022-11-23 ENCOUNTER — HOSPITAL ENCOUNTER (OUTPATIENT)
Facility: MEDICAL CENTER | Age: 37
End: 2022-11-23
Attending: NURSE PRACTITIONER
Payer: COMMERCIAL

## 2022-11-23 ENCOUNTER — TELEMEDICINE (OUTPATIENT)
Dept: MEDICAL GROUP | Facility: PHYSICIAN GROUP | Age: 37
End: 2022-11-23
Payer: COMMERCIAL

## 2022-11-23 ENCOUNTER — NON-PROVIDER VISIT (OUTPATIENT)
Dept: MEDICAL GROUP | Facility: PHYSICIAN GROUP | Age: 37
End: 2022-11-23
Payer: COMMERCIAL

## 2022-11-23 VITALS — HEIGHT: 62 IN | BODY MASS INDEX: 50.42 KG/M2 | WEIGHT: 274 LBS

## 2022-11-23 DIAGNOSIS — R68.89 FLU-LIKE SYMPTOMS: ICD-10-CM

## 2022-11-23 PROCEDURE — 0240U HCHG SARS-COV-2 COVID-19 NFCT DS RESP RNA 3 TRGT MIC: CPT

## 2022-11-23 PROCEDURE — 99999 PR NO CHARGE: CPT | Performed by: NURSE PRACTITIONER

## 2022-11-23 PROCEDURE — 99213 OFFICE O/P EST LOW 20 MIN: CPT | Mod: 95 | Performed by: NURSE PRACTITIONER

## 2022-11-23 ASSESSMENT — FIBROSIS 4 INDEX: FIB4 SCORE: 0.52

## 2022-11-23 NOTE — PROGRESS NOTES
Virtual Visit: Established Patient   This visit was conducted via Zoom using secure and encrypted videoconferencing technology.   The patient was in their home in the state of [place].    The patient's identity was confirmed and verbal consent was obtained for this virtual visit.     Subjective:   CC:   Chief Complaint   Patient presents with    Fever     x4day    Runny Nose     x4days    Chills     x4days       Katherine Carlos is a 36 y.o. female presenting for evaluation and management of:    Flu-like symptoms  Acute uncomplicated condition.  Symptoms started Saturday  Patient states that on Monday she had temperature of 102  She has been using TheraFlu, NyQuil, water, resting, Tylenol.  She also states associated diarrhea sore throat headache and congestion.  She denies chills, nausea, vomiting, constipation, shortness of breath  She states that she did get a COVID test on Monday and it was negative  She is requesting another test as well as flu test to make sure these are negative before she returns to work     ROS   See HPI    Current medicines (including changes today)  Current Outpatient Medications   Medication Sig Dispense Refill    estradiol (ESTRACE) 2 MG Tab Take 1 Tablet by mouth every day.      Non Formulary Request DHEA 25mg      albuterol 108 (90 Base) MCG/ACT Aero Soln inhalation aerosol Inhale 2 Puffs every 6 hours as needed for Shortness of Breath. 8.5 g 3    prazosin (MINIPRESS) 1 MG Cap Take 1 Capsule by mouth.      sertraline (ZOLOFT) 50 MG Tab Take 1 Tablet by mouth every day.      multivitamin (THERAGRAN) Tab Take 1 Tablet by mouth every day.      Cyanocobalamin (VITAMIN B-12 PO) Take 1 Tablet by mouth every day.      buPROPion (WELLBUTRIN XL) 300 MG XL tablet Take 1 Tablet by mouth every morning. (Patient taking differently: Take 1 Tablet by mouth every evening.) 90 Tablet 3     No current facility-administered medications for this visit.       Patient Active Problem List    Diagnosis  "Date Noted    Obstructive sleep apnea syndrome 11/15/2022    Flu-like symptoms 08/19/2022    Chronic cholecystitis 06/03/2022    Asthma 05/23/2022    Seasonal allergies 05/23/2022    Right hip pain 03/29/2022    Right lower quadrant abdominal pain 03/22/2022    Hepatic steatosis 03/22/2022    Ear pain, left 02/18/2022    Chronic post-traumatic stress disorder (PTSD) 01/11/2022    Mild episode of recurrent major depressive disorder (HCC) 01/11/2022    Anxiety 10/01/2021    Acne vulgaris 10/01/2021    History of partial hysterectomy 08/25/2021    History of gestational diabetes 08/25/2021    Class 3 severe obesity due to excess calories without serious comorbidity with body mass index (BMI) of 50.0 to 59.9 in adult ([facility]) 08/25/2021        Objective:   Ht 1.575 m (5' 2\")   Wt 124 kg (274 lb)   LMP 04/09/2012   BMI 50.12 kg/m²     Physical Exam:  Constitutional: Alert, no distress, well-groomed.  Skin: No rashes in visible areas.  Eye: Round. Conjunctiva clear, lids normal. No icterus.   ENMT: Lips pink without lesions, good dentition, moist mucous membranes. Phonation normal.   Neck: No masses, no thyromegaly. Moves freely without pain.  Respiratory: Unlabored respiratory effort, no audible wheeze.  Positive for cough nonproductive  Psych: Alert and oriented x3, normal affect and mood.     Assessment and Plan:   The following treatment plan was discussed:     1. Flu-like symptoms  - CoV-2 and Flu A/B by PCR (24 hour In-House): Collect NP swab in [facility]; Future   Provided patient with URI symptom management  Patient return back to office if symptoms do not improve by Tuesday or worsening symptoms    Follow-up: Return in about 6 days (around 11/29/2022).          "

## 2022-11-23 NOTE — LETTER
[facility]  [address] [address]     November 23, 2022    Patient: Katherine Carlos   YOB: 1985   Date of Visit: 11/23/2022       To Whom It May Concern:    Katherine Carlos was seen and treated in our department on 11/23/2022. Can return to work on 11/21/2022-11/24/2022 and can return to work on 11/25/2022.     Sincerely,     HONEY Mcfadden.

## 2022-11-23 NOTE — PROGRESS NOTES
Katherine Carlos is a 36 y.o. female here for a non-provider visit for COVID testing.    Clinic collect COVID order in system?: Yes    Patient tolerated specimen collection and no adverse effects were observed or reported: Yes

## 2022-11-23 NOTE — ASSESSMENT & PLAN NOTE
Acute uncomplicated condition.  Symptoms started Saturday  Patient states that on Monday she had temperature of 102  She has been using TheraFlu, NyQuil, water, resting, Tylenol.  She also states associated diarrhea sore throat headache and congestion.  She denies chills, nausea, vomiting, constipation, shortness of breath  She states that she did get a COVID test on Monday and it was negative  She is requesting another test as well as flu test to make sure these are negative before she returns to work

## 2022-11-24 DIAGNOSIS — R68.89 FLU-LIKE SYMPTOMS: ICD-10-CM

## 2022-11-24 LAB
FLUAV RNA SPEC QL NAA+PROBE: NEGATIVE
FLUBV RNA SPEC QL NAA+PROBE: NEGATIVE
SARS-COV-2 RNA RESP QL NAA+PROBE: NOTDETECTED
SPECIMEN SOURCE: NORMAL

## 2022-11-25 ENCOUNTER — OFFICE VISIT (OUTPATIENT)
Dept: URGENT CARE | Facility: CLINIC | Age: 37
End: 2022-11-25
Payer: COMMERCIAL

## 2022-11-25 VITALS
OXYGEN SATURATION: 97 % | WEIGHT: 274 LBS | HEIGHT: 62 IN | BODY MASS INDEX: 50.42 KG/M2 | DIASTOLIC BLOOD PRESSURE: 74 MMHG | HEART RATE: 78 BPM | SYSTOLIC BLOOD PRESSURE: 116 MMHG | RESPIRATION RATE: 16 BRPM | TEMPERATURE: 97.3 F

## 2022-11-25 DIAGNOSIS — J98.8 RTI (RESPIRATORY TRACT INFECTION): ICD-10-CM

## 2022-11-25 PROCEDURE — 99214 OFFICE O/P EST MOD 30 MIN: CPT | Performed by: PHYSICIAN ASSISTANT

## 2022-11-25 RX ORDER — METHYLPREDNISOLONE 4 MG/1
TABLET ORAL
Qty: 21 TABLET | Refills: 0 | Status: SHIPPED | OUTPATIENT
Start: 2022-11-25 | End: 2023-05-02

## 2022-11-25 RX ORDER — CODEINE PHOSPHATE AND GUAIFENESIN 10; 100 MG/5ML; MG/5ML
5 SOLUTION ORAL EVERY 6 HOURS PRN
Qty: 120 ML | Refills: 0 | Status: SHIPPED | OUTPATIENT
Start: 2022-11-25 | End: 2022-12-01

## 2022-11-25 RX ORDER — DOXYCYCLINE HYCLATE 100 MG
100 TABLET ORAL 2 TIMES DAILY
Qty: 14 TABLET | Refills: 0 | Status: SHIPPED | OUTPATIENT
Start: 2022-11-25 | End: 2022-12-02

## 2022-11-25 ASSESSMENT — ENCOUNTER SYMPTOMS
FEVER: 1
COUGH: 1
MYALGIAS: 0
SORE THROAT: 0
HEADACHES: 1
CHILLS: 0
SWEATS: 0
HEMOPTYSIS: 0
WHEEZING: 1
ABDOMINAL PAIN: 0
RHINORRHEA: 1
SHORTNESS OF BREATH: 1
SINUS PAIN: 1
NAUSEA: 0
VOMITING: 0
SPUTUM PRODUCTION: 1

## 2022-11-25 ASSESSMENT — FIBROSIS 4 INDEX: FIB4 SCORE: 0.52

## 2022-11-25 NOTE — PROGRESS NOTES
Subjective     Katherine Carlos is a 36 y.o. female who presents with Cough (Severe cough, congestion, fever of 99.0, taking dayquil, nyquil, sob, pneumonia concern X 2 weeks )            Cough  This is a new problem. Episode onset: 2 weeks. The problem has been gradually worsening. The cough is Productive of purulent sputum. Associated symptoms include a fever, headaches, nasal congestion, rhinorrhea, shortness of breath and wheezing. Pertinent negatives include no chest pain, chills, ear congestion, ear pain, hemoptysis, myalgias, postnasal drip, rash, sore throat or sweats. Nothing aggravates the symptoms. She has tried OTC cough suppressant for the symptoms. The treatment provided mild relief. Her past medical history is significant for asthma.     The patient was tested for COVID twice and influenza once this past week. Last tests occurred 2 days ago. All testing was negative.       Past Medical History:   Diagnosis Date    ASTHMA     medications not needed    FHx: cleft palate 2010    FHx: cleft palate 2010    Mother, brother, uncle (maternal), and aunt (maternal) with cleft palate     GBS (group B Streptococcus carrier), +RV culture, currently pregnant 2010    History of  2010    History of COVID-19 2021    History of macrosomia in infant in prior pregnancy, currently pregnant 2010    History of macrosomia in infant in prior pregnancy, currently pregnant 2010    9lb5oz     Hypoglycemia     at 16 years old    Previous  section- desires Repeat 2010    SUPERVISION OF HIGH-RISK PREGNANCY 2010    SUPERVISION OF HIGH-RISK PREGNANCY 2010         Past Surgical History:   Procedure Laterality Date    HYSTERECTOMY LAPAROSCOPY  2021    still has ovaries    CHOLECYSTECTOMY  10/2020    REPEAT C SECTION W TUBAL LIGATION  2010    Performed by JF DOHERTY at [facility]    PRIMARY C SECTION       "TONSILLECTOMY  at age 5         Family History   Problem Relation Age of Onset    Lupus Mother     Heart Disease Mother     Diabetes Father     Heart Disease Father     Diabetes Brother     Heart Disease Maternal Uncle         MI    Diabetes Maternal Grandfather     Parkinson's Disease Paternal Grandmother     Diabetes Paternal Grandmother     Cancer Sister         cervical cancer    Alcohol abuse Paternal Aunt     Heart Disease Paternal Grandfather      Chocolate, Citrus, Latex, Peanut oil, Tomato, Milk protein extract, Acetaminophen, and Morphine        Medications, Allergies, and current problem list reviewed today in Epic    Review of Systems   Constitutional:  Positive for fever and malaise/fatigue. Negative for chills.   HENT:  Positive for congestion, rhinorrhea and sinus pain. Negative for ear pain, postnasal drip and sore throat.    Respiratory:  Positive for cough, sputum production, shortness of breath and wheezing. Negative for hemoptysis.    Cardiovascular:  Negative for chest pain and leg swelling.   Gastrointestinal:  Negative for abdominal pain, nausea and vomiting.   Musculoskeletal:  Negative for myalgias.   Skin:  Negative for rash.   Neurological:  Positive for headaches.        All other systems reviewed and are negative.       Objective     /74 (BP Location: Left arm, Patient Position: Sitting, BP Cuff Size: Large adult)   Pulse 78   Temp 36.3 °C (97.3 °F) (Temporal)   Resp 16   Ht 1.575 m (5' 2\")   Wt 124 kg (274 lb)   LMP 04/09/2012   SpO2 97%   BMI 50.12 kg/m²      Physical Exam  Constitutional:       General: She is not in acute distress.     Appearance: She is not ill-appearing.   HENT:      Head: Normocephalic and atraumatic.      Right Ear: Tympanic membrane, ear canal and external ear normal.      Left Ear: Tympanic membrane, ear canal and external ear normal.      Nose: Congestion and rhinorrhea present.      Mouth/Throat:      Mouth: Mucous membranes are moist.      " Pharynx: No posterior oropharyngeal erythema.   Eyes:      Conjunctiva/sclera: Conjunctivae normal.   Cardiovascular:      Rate and Rhythm: Normal rate and regular rhythm.      Heart sounds: Normal heart sounds.   Pulmonary:      Effort: Pulmonary effort is normal. No respiratory distress.      Breath sounds: Wheezing present. No rhonchi or rales.      Comments: Spasmodic cough. Expiratory wheezes   Skin:     General: Skin is warm and dry.   Neurological:      General: No focal deficit present.      Mental Status: She is alert and oriented to person, place, and time.   Psychiatric:         Mood and Affect: Mood normal.         Behavior: Behavior normal.         Thought Content: Thought content normal.         Judgment: Judgment normal.                           Assessment & Plan        1. RTI (respiratory tract infection)  doxycycline (VIBRAMYCIN) 100 MG Tab    methylPREDNISolone (MEDROL DOSEPAK) 4 MG Tablet Therapy Pack    guaifenesin-codeine (ROBITUSSIN AC) Solution oral solution          Current Outpatient Medications:     doxycycline (VIBRAMYCIN) 100 MG Tab, Take 1 Tablet by mouth 2 times a day for 7 days., Disp: 14 Tablet, Rfl: 0    methylPREDNISolone (MEDROL DOSEPAK) 4 MG Tablet Therapy Pack, Follow schedule on package instructions., Disp: 21 Tablet, Rfl: 0    guaifenesin-codeine (ROBITUSSIN AC) Solution oral solution, Take 5 mL by mouth every 6 hours as needed for Cough for up to 6 days., Disp: 120 mL, Rfl: 0  Sedation side effects discussed. No Driving or alcohol with medication given.        Differential diagnoses, Supportive care, and indications for immediate follow-up discussed with patient.   Pathogenesis of diagnosis discussed including typical length and natural progression.   Instructed to return to clinic or nearest emergency department for any change in condition, further concerns, or worsening of symptoms.    The patient demonstrated a good understanding and agreed with the treatment  plan.      Rukhsana Madrigal P.A.-C.

## 2022-11-29 RX ORDER — BUPROPION HYDROCHLORIDE 300 MG/1
300 TABLET ORAL EVERY MORNING
Qty: 90 TABLET | Refills: 3 | Status: SHIPPED | OUTPATIENT
Start: 2022-11-29 | End: 2024-01-22

## 2022-12-15 ENCOUNTER — OFFICE VISIT (OUTPATIENT)
Dept: URGENT CARE | Facility: CLINIC | Age: 37
End: 2022-12-15
Payer: COMMERCIAL

## 2022-12-15 VITALS
WEIGHT: 279.7 LBS | HEIGHT: 62 IN | SYSTOLIC BLOOD PRESSURE: 120 MMHG | BODY MASS INDEX: 51.47 KG/M2 | HEART RATE: 101 BPM | OXYGEN SATURATION: 98 % | TEMPERATURE: 97.6 F | DIASTOLIC BLOOD PRESSURE: 84 MMHG | RESPIRATION RATE: 18 BRPM

## 2022-12-15 DIAGNOSIS — J45.20 MILD INTERMITTENT ASTHMA WITHOUT COMPLICATION: ICD-10-CM

## 2022-12-15 DIAGNOSIS — R68.89 FLU-LIKE SYMPTOMS: ICD-10-CM

## 2022-12-15 DIAGNOSIS — H66.006 RECURRENT ACUTE SUPPURATIVE OTITIS MEDIA WITHOUT SPONTANEOUS RUPTURE OF TYMPANIC MEMBRANE OF BOTH SIDES: ICD-10-CM

## 2022-12-15 LAB
FLUAV+FLUBV AG SPEC QL IA: NEGATIVE
INT CON NEG: NEGATIVE
INT CON NEG: NEGATIVE
INT CON POS: POSITIVE
INT CON POS: POSITIVE
S PYO AG THROAT QL: NEGATIVE

## 2022-12-15 PROCEDURE — 87880 STREP A ASSAY W/OPTIC: CPT | Performed by: PHYSICIAN ASSISTANT

## 2022-12-15 PROCEDURE — 99213 OFFICE O/P EST LOW 20 MIN: CPT | Performed by: PHYSICIAN ASSISTANT

## 2022-12-15 PROCEDURE — 87804 INFLUENZA ASSAY W/OPTIC: CPT | Performed by: PHYSICIAN ASSISTANT

## 2022-12-15 RX ORDER — AMOXICILLIN 500 MG/1
500 CAPSULE ORAL 2 TIMES DAILY
Qty: 20 CAPSULE | Refills: 0 | Status: SHIPPED | OUTPATIENT
Start: 2022-12-15 | End: 2022-12-25

## 2022-12-15 RX ORDER — ALBUTEROL SULFATE 90 UG/1
2 AEROSOL, METERED RESPIRATORY (INHALATION) EVERY 6 HOURS PRN
Qty: 8.5 G | Refills: 0 | Status: SHIPPED | OUTPATIENT
Start: 2022-12-15 | End: 2023-03-22

## 2022-12-15 ASSESSMENT — FIBROSIS 4 INDEX: FIB4 SCORE: 0.52

## 2022-12-15 ASSESSMENT — ENCOUNTER SYMPTOMS
HEADACHES: 0
MYALGIAS: 0
NAUSEA: 0
CHILLS: 0
DIARRHEA: 0
SORE THROAT: 1
CONSTIPATION: 0
FEVER: 0
ABDOMINAL PAIN: 0
VOMITING: 0
COUGH: 1
EYE PAIN: 0
SHORTNESS OF BREATH: 0

## 2022-12-15 NOTE — LETTER
December 15, 2022    To Whom It May Concern:         This is confirmation that Katherine Carlos attended her scheduled appointment with Graham Luke P.A.-C. on 12/15/22.  Please excuse this patient's absence from work due to illness from 12/14 thru 12/16. She is cleared to return thereafter.         If you have any questions please do not hesitate to call me at the phone number listed below.    Sincerely,          Graham Luke P.A.-C.  [phone]

## 2022-12-16 NOTE — PROGRESS NOTES
"Subjective:   Katherine Carlos is a 36 y.o. female who presents for Pharyngitis (Body aches, congestion, white/yellow phlegm, cough x yesterday; strep exposure) and Medication Refill (Albuterol)      Patient presents complaining of sore throat with ear pain, congestion and cough.  I was out of her albuterol but does not currently have any difficulty breathing.  She was exposed to strep.    Review of Systems   Constitutional:  Positive for malaise/fatigue. Negative for chills and fever.   HENT:  Positive for congestion, ear pain and sore throat.    Eyes:  Negative for pain.   Respiratory:  Positive for cough. Negative for shortness of breath.    Cardiovascular:  Negative for chest pain.   Gastrointestinal:  Negative for abdominal pain, constipation, diarrhea, nausea and vomiting.   Genitourinary:  Negative for dysuria.   Musculoskeletal:  Negative for myalgias.   Skin:  Negative for rash.   Neurological:  Negative for headaches.     Medications, Allergies, and current problem list reviewed today in Epic.     Objective:     /84   Pulse (!) 101   Temp 36.4 °C (97.6 °F) (Temporal)   Resp 18   Ht 1.575 m (5' 2\")   Wt (!) 127 kg (279 lb 11.2 oz)   SpO2 98%     Physical Exam  Vitals reviewed.   Constitutional:       Appearance: Normal appearance. She is not toxic-appearing.   HENT:      Head: Normocephalic and atraumatic.      Right Ear: External ear normal.      Left Ear: External ear normal.      Ears:      Comments: Erythema right TM with bulging     Nose: Rhinorrhea present.      Mouth/Throat:      Mouth: Mucous membranes are moist.      Pharynx: Oropharynx is clear. No posterior oropharyngeal erythema.   Eyes:      Conjunctiva/sclera: Conjunctivae normal.   Cardiovascular:      Rate and Rhythm: Normal rate and regular rhythm.   Pulmonary:      Effort: Pulmonary effort is normal.      Breath sounds: Normal breath sounds.   Musculoskeletal:      Cervical back: Normal range of motion.   Lymphadenopathy: "      Cervical: No cervical adenopathy.   Skin:     General: Skin is warm and dry.      Capillary Refill: Capillary refill takes less than 2 seconds.   Neurological:      Mental Status: She is alert and oriented to person, place, and time.       Assessment/Plan:     Diagnosis and associated orders:     1. Recurrent acute suppurative otitis media without spontaneous rupture of tympanic membrane of both sides  amoxicillin (AMOXIL) 500 MG Cap      2. Mild intermittent asthma without complication  albuterol 108 (90 Base) MCG/ACT Aero Soln inhalation aerosol      3. Flu-like symptoms  POCT Rapid Strep A    POCT Influenza A/B         Comments/MDM:     Point-of-care testing negative the patient does demonstrate a secondary otitis media on the right side.  Albuterol sent over the patient does not demonstrate any adventitious breath sounds.         Differential diagnosis, natural history, supportive care, and indications for immediate follow-up discussed.    Advised the patient to follow-up with the primary care physician for recheck, reevaluation, and consideration of further management.    Please note that this dictation was created using voice recognition software. I have made a reasonable attempt to correct obvious errors, but I expect that there are errors of grammar and possibly content that I did not discover before finalizing the note.    This note was electronically signed by Graham Luke PA-C

## 2023-03-22 ENCOUNTER — OFFICE VISIT (OUTPATIENT)
Dept: MEDICAL GROUP | Facility: PHYSICIAN GROUP | Age: 38
End: 2023-03-22
Payer: COMMERCIAL

## 2023-03-22 VITALS
BODY MASS INDEX: 52.01 KG/M2 | RESPIRATION RATE: 18 BRPM | OXYGEN SATURATION: 97 % | HEART RATE: 101 BPM | HEIGHT: 62 IN | WEIGHT: 282.6 LBS | SYSTOLIC BLOOD PRESSURE: 120 MMHG | TEMPERATURE: 100.9 F | DIASTOLIC BLOOD PRESSURE: 76 MMHG

## 2023-03-22 DIAGNOSIS — R68.89 FLU-LIKE SYMPTOMS: ICD-10-CM

## 2023-03-22 DIAGNOSIS — R11.2 NAUSEA AND VOMITING, UNSPECIFIED VOMITING TYPE: ICD-10-CM

## 2023-03-22 LAB
FLUAV RNA SPEC QL NAA+PROBE: NEGATIVE
FLUBV RNA SPEC QL NAA+PROBE: NEGATIVE
RSV RNA SPEC QL NAA+PROBE: NEGATIVE
SARS-COV-2 RNA RESP QL NAA+PROBE: NEGATIVE

## 2023-03-22 PROCEDURE — 0241U POCT CEPHEID COV-2, FLU A/B, RSV - PCR: CPT | Performed by: NURSE PRACTITIONER

## 2023-03-22 PROCEDURE — 99214 OFFICE O/P EST MOD 30 MIN: CPT | Performed by: NURSE PRACTITIONER

## 2023-03-22 RX ORDER — ONDANSETRON 4 MG/1
4 TABLET, ORALLY DISINTEGRATING ORAL EVERY 6 HOURS PRN
Qty: 10 TABLET | Refills: 0 | Status: SHIPPED | OUTPATIENT
Start: 2023-03-22 | End: 2023-05-04

## 2023-03-22 RX ORDER — NAPROXEN 500 MG/1
500 TABLET ORAL
COMMUNITY
Start: 2022-12-18 | End: 2023-03-22

## 2023-03-22 RX ORDER — BUPROPION HYDROCHLORIDE 300 MG/1
300 TABLET ORAL
COMMUNITY
Start: 2022-11-29 | End: 2023-03-22

## 2023-03-22 RX ORDER — PRASTERONE (DHEA) 50 MG
TABLET ORAL
COMMUNITY
End: 2023-08-08

## 2023-03-22 RX ORDER — ONDANSETRON 4 MG/1
4 TABLET, ORALLY DISINTEGRATING ORAL ONCE
Status: COMPLETED | OUTPATIENT
Start: 2023-03-22 | End: 2023-03-22

## 2023-03-22 RX ORDER — ONDANSETRON 4 MG/1
4 TABLET, ORALLY DISINTEGRATING ORAL ONCE
Status: DISCONTINUED | OUTPATIENT
Start: 2023-03-22 | End: 2023-03-22

## 2023-03-22 RX ORDER — ONDANSETRON 4 MG/1
4 TABLET, FILM COATED ORAL EVERY 4 HOURS PRN
Qty: 20 TABLET | Refills: 0 | Status: SHIPPED | OUTPATIENT
Start: 2023-03-22 | End: 2023-03-22

## 2023-03-22 RX ORDER — ESTRADIOL 2 MG/1
1 TABLET ORAL DAILY
COMMUNITY
Start: 2022-11-07 | End: 2023-03-22

## 2023-03-22 RX ORDER — ONDANSETRON 2 MG/ML
4 INJECTION INTRAMUSCULAR; INTRAVENOUS ONCE
Status: DISCONTINUED | OUTPATIENT
Start: 2023-03-22 | End: 2023-03-22

## 2023-03-22 RX ORDER — ONDANSETRON 4 MG/1
4 TABLET, ORALLY DISINTEGRATING ORAL EVERY 6 HOURS PRN
Qty: 10 TABLET | Refills: 0 | Status: SHIPPED | OUTPATIENT
Start: 2023-03-22 | End: 2023-03-22

## 2023-03-22 RX ORDER — ESTRADIOL 2 MG/1
TABLET ORAL
COMMUNITY
End: 2023-03-22

## 2023-03-22 RX ADMIN — ONDANSETRON 4 MG: 4 TABLET, ORALLY DISINTEGRATING ORAL at 15:36

## 2023-03-22 ASSESSMENT — ENCOUNTER SYMPTOMS
BLOOD IN STOOL: 0
CHILLS: 1
COUGH: 0
VOMITING: 1
HEADACHES: 0
SHORTNESS OF BREATH: 0
FEVER: 1
DIARRHEA: 1
NAUSEA: 1
ABDOMINAL PAIN: 0
DIZZINESS: 0
CONSTIPATION: 0

## 2023-03-22 ASSESSMENT — FIBROSIS 4 INDEX: FIB4 SCORE: 0.53

## 2023-03-22 ASSESSMENT — PATIENT HEALTH QUESTIONNAIRE - PHQ9: CLINICAL INTERPRETATION OF PHQ2 SCORE: 0

## 2023-03-22 NOTE — LETTER
[facility]  [address] [id number]     March 22, 2023    Patient: Katherine Carlos   YOB: 1985   Date of Visit: 3/22/2023       To Whom It May Concern:    Katherine Carlos was seen and treated in our department on 3/22/2023. Please excuse her from work Tuesday 3/22/2023 as she was sick. She can return to work once she is without fever, vomiting and diarrhea for 24 hours without use of medication.      Sincerely,     HONEY Mcfadden.

## 2023-03-22 NOTE — PROGRESS NOTES
"CC:  Chief Complaint   Patient presents with    Nausea     Fever, diarrhea, loss of appetite, vomiting, headache x 3/22; covid tests negative       HISTORY OF PRESENT ILLNESS: Patient is a 37 y.o. female established patient presenting     Problem   Flu-Like Symptoms    Onset saturday of n/v   [place] fine Sunday and Monday   Tried to go to work today but started to have N/V/diarrhea, chills, fever, body aches  Tried to take tylenol but couldn't keep it down due to n/v  Has not been able to keep anything down today   Now just vomiting bile                  Review of Systems   Constitutional:  Positive for chills, fever and malaise/fatigue.   Respiratory:  Negative for cough and shortness of breath.    Cardiovascular:  Negative for chest pain.   Gastrointestinal:  Positive for diarrhea, nausea and vomiting. Negative for abdominal pain, blood in stool, constipation and melena.   Genitourinary: Negative.    Neurological:  Negative for dizziness and headaches.           - NOTE: All other systems reviewed and are negative, except as in HPI.      Exam:    /76   Pulse (!) 101   Temp (!) 38.3 °C (100.9 °F) (Temporal)   Resp 18   Ht 1.575 m (5' 2\")   Wt (!) 128 kg (282 lb 9.6 oz)   SpO2 97%  Body mass index is 51.69 kg/m².    Physical Exam  Constitutional:       General: She is not in acute distress.     Appearance: She is ill-appearing. She is not diaphoretic.   HENT:      Head: Normocephalic and atraumatic.   Neurological:      Mental Status: She is alert.         Assessment/Plan:    1. Nausea and vomiting, unspecified vomiting type  Acute and uncomplicated condition   - ondansetron (ZOFRAN ODT) dispertab 4 mg  - POCT CoV-2, Flu A/B, RSV by PCR  - ondansetron (ZOFRAN ODT) 4 MG TABLET DISPERSIBLE; Take 1 Tablet by mouth every 6 hours as needed for Nausea/Vomiting.  Dispense: 10 Tablet; Refill: 0  2. Flu-like symptoms  - POCT CoV-2, Flu A/B, RSV by PCR- NEGATIVE    Placed phone call to patient as testing takes " roughly 40 minutes.  Left message on patient's mobile phone stating that the flu and COVID were negative.  I do believe at this time that patient has an acute viral gastroenteritis and as long as she is able to keep fluids down and the nausea and vomiting at bay she should be able to start to recover here soon.  Did discuss on the voicemail that if she does not have the ability keep fluids down the next 24 hours that she should be going to the emergency room for possible IV hydration.  We will continue to monitor her if she continues to have diarrhea for more than a week we will look into possible stool testing         Discussed with patient possible alternative diagnoses, patient is to take all medications as prescribed.     If symptoms persist FU w/PCP, if symptoms worsen go to emergency room.     If experiencing any side effects from prescribed medications reports to the office immediately or go to emergency room.    Reviewed indication, dosage, usage and potential adverse effects of prescribed medications.     Reviewed risks and benefits of treatment plan. Patient verbalizes understanding of all instruction and verbally agrees to plan.      Return for Return for worsening symptoms.      Please note that this dictation was created using voice recognition software. I have made every reasonable attempt to correct obvious errors, but I expect that there are errors of grammar and possibly content that I did not discover before finalizing the note.

## 2023-04-11 ENCOUNTER — OFFICE VISIT (OUTPATIENT)
Dept: URGENT CARE | Facility: CLINIC | Age: 38
End: 2023-04-11
Payer: COMMERCIAL

## 2023-04-11 VITALS
OXYGEN SATURATION: 96 % | WEIGHT: 280 LBS | TEMPERATURE: 97.9 F | SYSTOLIC BLOOD PRESSURE: 128 MMHG | DIASTOLIC BLOOD PRESSURE: 82 MMHG | BODY MASS INDEX: 51.53 KG/M2 | RESPIRATION RATE: 16 BRPM | HEIGHT: 62 IN | HEART RATE: 82 BPM

## 2023-04-11 DIAGNOSIS — M25.572 ACUTE LEFT ANKLE PAIN: ICD-10-CM

## 2023-04-11 DIAGNOSIS — R21 RASH: ICD-10-CM

## 2023-04-11 PROCEDURE — 99213 OFFICE O/P EST LOW 20 MIN: CPT | Performed by: FAMILY MEDICINE

## 2023-04-11 RX ORDER — TRIAMCINOLONE ACETONIDE 1 MG/G
1 CREAM TOPICAL 2 TIMES DAILY PRN
Qty: 15 G | Refills: 0 | Status: SHIPPED | OUTPATIENT
Start: 2023-04-11 | End: 2023-10-13

## 2023-04-11 ASSESSMENT — FIBROSIS 4 INDEX: FIB4 SCORE: 0.53

## 2023-04-11 NOTE — PROGRESS NOTES
"  Subjective:      37 y.o. female presents to urgent care for left ankle pain that started about 8 days ago.  There is no inciting event or trauma at that time.  The pain is constant and has been worsening, is described as both achy and throbbing, currently rated 7/10.  She has been using Tylenol with good relief in symptoms.  No prior history of gout.    She also notes a rash that has been present in her groin area since December.  It has been getting progressively more itchy.  She has tried hydrocortisone cream with some relief in symptoms.  She also tried over-the-counter athlete's foot cream without any change at all.  No open areas or discharge.  She remains afebrile.    She denies any other questions or concerns at this time.    Current problem list, medication, and past medical/surgical history were reviewed in Epic.    ROS  See HPI     Objective:      /82 (BP Location: Right arm, Patient Position: Sitting, BP Cuff Size: Adult)   Pulse 82   Temp 36.6 °C (97.9 °F) (Temporal)   Resp 16   Ht 1.575 m (5' 2\")   Wt (!) 127 kg (280 lb)   LMP 04/09/2012   SpO2 96%   BMI 51.21 kg/m²     Physical Exam  Constitutional:       General: She is not in acute distress.     Appearance: She is not diaphoretic.   Cardiovascular:      Rate and Rhythm: Normal rate and regular rhythm.      Heart sounds: Normal heart sounds.   Pulmonary:      Effort: Pulmonary effort is normal. No respiratory distress.      Breath sounds: Normal breath sounds.   Musculoskeletal:      Comments: Edema just proximal to her left lateral malleolus.  The same area is tender to palpation.  No overlying skin changes.  Not tender to palpation of medial or lateral malleolus or midfoot zone.  She is able to plantar dorsiflex against resistance.  Normal gait.   Skin:     Findings: Rash (dry flaky rash to her left groin) present.   Neurological:      Mental Status: She is alert.   Psychiatric:         Mood and Affect: Affect normal.         " Judgment: Judgment normal.     Assessment/Plan:     1. Acute left ankle pain  Does not meet criteria for imaging per [place] ankle rule.  No trauma.  Unsure of etiology, is tender to palpation like with gout, although there is no overlying erythema.  She is encouraged to use an NSAID scheduled for the next couple of days.    2. Rash  Triamcinolone cream as needed.  - triamcinolone acetonide (KENALOG) 0.1 % Cream; Apply 1 Application. topically 2 times a day as needed (rash, itch).  Dispense: 15 g; Refill: 0      Instructed to return to Urgent Care or nearest Emergency Department if symptoms fail to improve, for any change in condition, further concerns, or new concerning symptoms. Patient states understanding of the plan of care and discharge instructions.    Ana Gloria M.D.

## 2023-05-02 ENCOUNTER — OFFICE VISIT (OUTPATIENT)
Dept: MEDICAL GROUP | Facility: PHYSICIAN GROUP | Age: 38
End: 2023-05-02
Payer: OTHER GOVERNMENT

## 2023-05-02 VITALS
WEIGHT: 290 LBS | DIASTOLIC BLOOD PRESSURE: 70 MMHG | BODY MASS INDEX: 53.37 KG/M2 | RESPIRATION RATE: 16 BRPM | TEMPERATURE: 97.4 F | SYSTOLIC BLOOD PRESSURE: 124 MMHG | HEIGHT: 62 IN | OXYGEN SATURATION: 96 % | HEART RATE: 88 BPM

## 2023-05-02 DIAGNOSIS — Z00.00 PHYSICAL EXAM, ANNUAL: ICD-10-CM

## 2023-05-02 DIAGNOSIS — H10.32 ACUTE CONJUNCTIVITIS OF LEFT EYE, UNSPECIFIED ACUTE CONJUNCTIVITIS TYPE: ICD-10-CM

## 2023-05-02 DIAGNOSIS — M25.50 ARTHRALGIA, UNSPECIFIED JOINT: ICD-10-CM

## 2023-05-02 PROCEDURE — 99214 OFFICE O/P EST MOD 30 MIN: CPT | Performed by: PHYSICIAN ASSISTANT

## 2023-05-02 RX ORDER — OFLOXACIN 3 MG/ML
1 SOLUTION/ DROPS OPHTHALMIC 4 TIMES DAILY
Qty: 5 ML | Refills: 0 | Status: SHIPPED | OUTPATIENT
Start: 2023-05-02 | End: 2023-05-07

## 2023-05-02 ASSESSMENT — FIBROSIS 4 INDEX: FIB4 SCORE: 0.53

## 2023-05-02 NOTE — PROGRESS NOTES
"CC:  Chief Complaint   Patient presents with    Conjunctivitis     (L)  Joint sosa    Medication Refill     triamcinolone acetonide        HISTORY OF PRESENT ILLNESS: Patient is a 37 y.o. female established patient presenting with issues below  Pt started gettting L eye redness that started yesterday. It is uncomfortable.   Pt having joint pain and swelling for past few weeks. Has been spreading to other joints. Taking motrin and tylenol.     Current Outpatient Medications   Medication Sig Dispense Refill    triamcinolone acetonide (KENALOG) 0.1 % Cream Apply 1 Application. topically 2 times a day as needed (rash, itch). 15 g 0    DHEA 50 MG Tab 1 tab(s)      buPROPion (WELLBUTRIN XL) 300 MG XL tablet Take 1 Tablet by mouth every morning. 90 Tablet 3    estradiol (ESTRACE) 2 MG Tab Take 1 Tablet by mouth every day.      albuterol 108 (90 Base) MCG/ACT Aero Soln inhalation aerosol Inhale 2 Puffs every 6 hours as needed for Shortness of Breath. 8.5 g 3    prazosin (MINIPRESS) 1 MG Cap Take 1 mg by mouth.      sertraline (ZOLOFT) 50 MG Tab Take 1 Tablet by mouth every day.      multivitamin (THERAGRAN) Tab Take 1 Tablet by mouth every day.      Cyanocobalamin (VITAMIN B-12 PO) Take 1 Tablet by mouth every day.      ondansetron (ZOFRAN ODT) 4 MG TABLET DISPERSIBLE Take 1 Tablet by mouth every 6 hours as needed for Nausea/Vomiting. (Patient not taking: Reported on 5/2/2023) 10 Tablet 0     No current facility-administered medications for this visit.        ROS:     ROS    Exam:    /70   Pulse 88   Temp 36.3 °C (97.4 °F) (Temporal)   Resp 16   Ht 1.575 m (5' 2\")   Wt (!) 132 kg (290 lb)   SpO2 96%  Body mass index is 53.04 kg/m².    General:  Well nourished, well developed female in NAD  HEENT: Positive for injection of L eye lateral conjunctiva  Neck: Supple.   Skin: Warm and dry. No obvious lesions  Neuro: Normal muscle tone. Gait normal. Alert and oriented.  Psych: Normal mood and affect      Please note " that this dictation was created using voice recognition software. I have made every reasonable attempt to correct obvious errors, but I expect that there are errors of grammar and possibly content that I did not discover before finalizing the note.        Assessment/Plan:    1. Physical exam, annual    - TSH WITH REFLEX TO FT4; Future    2. Arthralgia, unspecified joint  Will check autoimmune labs and she will discuss with PCP  - RHEUMATOID ARTHRITIS FACTOR; Future  - ERICH REFLEXIVE PROFILE; Future  - CRP QUANTITIVE (NON-CARDIAC); Future  - Sed Rate; Future      3. Acute conjunctivitis of left eye, unspecified acute conjunctivitis type  Start on eye drops  - ofloxacin (OCUFLOX) 0.3 % Solution; Administer 1 Drop into both eyes 4 times a day for 5 days.  Dispense: 5 mL; Refill: 0

## 2023-05-04 ENCOUNTER — OFFICE VISIT (OUTPATIENT)
Dept: URGENT CARE | Facility: CLINIC | Age: 38
End: 2023-05-04
Payer: OTHER GOVERNMENT

## 2023-05-04 VITALS
SYSTOLIC BLOOD PRESSURE: 128 MMHG | BODY MASS INDEX: 53.92 KG/M2 | HEIGHT: 62 IN | TEMPERATURE: 98.6 F | DIASTOLIC BLOOD PRESSURE: 76 MMHG | HEART RATE: 67 BPM | RESPIRATION RATE: 18 BRPM | WEIGHT: 293 LBS | OXYGEN SATURATION: 96 %

## 2023-05-04 DIAGNOSIS — M26.609 TMJ DYSFUNCTION: ICD-10-CM

## 2023-05-04 PROCEDURE — 99213 OFFICE O/P EST LOW 20 MIN: CPT | Performed by: PHYSICIAN ASSISTANT

## 2023-05-04 RX ORDER — PREDNISONE 10 MG/1
30 TABLET ORAL 2 TIMES DAILY
Qty: 30 TABLET | Refills: 0 | Status: SHIPPED | OUTPATIENT
Start: 2023-05-04 | End: 2023-05-09

## 2023-05-04 ASSESSMENT — ENCOUNTER SYMPTOMS
VOMITING: 0
DIAPHORESIS: 0
EYE PAIN: 0
NAUSEA: 0
ABDOMINAL PAIN: 0
EYE DISCHARGE: 0
CONSTIPATION: 0
CHILLS: 0
EYE REDNESS: 0
SHORTNESS OF BREATH: 0
DIZZINESS: 0
SINUS PAIN: 0
HEADACHES: 0
DIARRHEA: 0
SORE THROAT: 0
WHEEZING: 0
COUGH: 0
FEVER: 0

## 2023-05-04 ASSESSMENT — FIBROSIS 4 INDEX: FIB4 SCORE: 0.53

## 2023-05-05 NOTE — PROGRESS NOTES
"  Subjective:     Katherine Carlos  is a 37 y.o. female who presents for Otalgia (Both ears in pain ,Sharp pain. No fever. )       She presents today with left-sided otalgia and jaw pain that has been ongoing over the last 24 hours.  Denies any trauma or injury to the jaw associated with symptom onset.  No fever/chills/sweats, no chest pain or shortness of breath.  She does note generalized multijoint pain that has been ongoing over the past several months, she has been evaluated by her primary care provider and labs have been ordered to evaluate for possible rheumatological pathology, patient has yet to obtain these labs.  She states that her left-sided jaw pain is worsened with opening and closing her mouth and with chewing.  Has taken Tylenol and anti-inflammatory medications today without any relief.     Review of Systems   Constitutional:  Negative for chills, diaphoresis, fever and malaise/fatigue.   HENT:  Positive for ear pain. Negative for congestion, ear discharge, sinus pain and sore throat.         Left jaw pain   Eyes:  Negative for pain, discharge and redness.   Respiratory:  Negative for cough, shortness of breath and wheezing.    Cardiovascular:  Negative for chest pain.   Gastrointestinal:  Negative for abdominal pain, constipation, diarrhea, nausea and vomiting.   Genitourinary:  Negative for dysuria, frequency and urgency.   Neurological:  Negative for dizziness and headaches.    Allergies   Allergen Reactions    Chocolate Hives, Diarrhea and Itching     tongue itching      Citrus Hives and Nausea     tongue itching    Latex Hives and Unspecified     Skin burns.      Peanut Oil Hives and Itching     tongue itching      Tomato Itching     Itchy tongue      Citrus Calcium +D [Calcium Carbonate-Vitamin D] Unspecified    Lac Bovis Unspecified    Milk Protein Extract      Pt reports that she gets gassy     Acetaminophen Nausea     \"Upset stomach when taken on empty stomach\"    Morphine Vomiting " "    Past Medical History:   Diagnosis Date    ASTHMA     medications not needed    FHx: cleft palate 2010    FHx: cleft palate 2010    Mother, brother, uncle (maternal), and aunt (maternal) with cleft palate     GBS (group B Streptococcus carrier), +RV culture, currently pregnant 2010    History of  2010    History of COVID-19 2021    History of macrosomia in infant in prior pregnancy, currently pregnant 2010    History of macrosomia in infant in prior pregnancy, currently pregnant 2010    9lb5oz     Hypoglycemia     at 16 years old    Previous  section- desires Repeat 2010    SUPERVISION OF HIGH-RISK PREGNANCY 2010    SUPERVISION OF HIGH-RISK PREGNANCY 2010        Objective:   /76 (BP Location: Right arm, Patient Position: Sitting, BP Cuff Size: Adult)   Pulse 67   Temp 37 °C (98.6 °F) (Temporal)   Resp 18   Ht 1.575 m (5' 2\")   Wt (!) 133 kg (294 lb)   LMP 2012   SpO2 96%   BMI 53.77 kg/m²   Physical Exam  Vitals and nursing note reviewed.   Constitutional:       General: She is not in acute distress.     Appearance: Normal appearance. She is not ill-appearing, toxic-appearing or diaphoretic.   HENT:      Head: Normocephalic.      Right Ear: Tympanic membrane, ear canal and external ear normal. There is no impacted cerumen.      Left Ear: Tympanic membrane, ear canal and external ear normal. There is no impacted cerumen.      Ears:      Comments: Bilateral TM tubes in place, no discharge     Nose: No congestion or rhinorrhea.      Mouth/Throat:      Mouth: Mucous membranes are moist.      Pharynx: No oropharyngeal exudate or posterior oropharyngeal erythema.      Comments: There is reproducible point tenderness over the left TMJ, worsened with opening closing mouth.  No mechanical symptoms with opening and closing mouth.  No jaw malocclusion.  No soft tissue swelling of the sublingual mucosa, no " swelling of the soft or hard palate, no unilarteral oral pharynx swelling, no uvular deviation.  Eyes:      General:         Right eye: No discharge.         Left eye: No discharge.      Conjunctiva/sclera: Conjunctivae normal.   Cardiovascular:      Rate and Rhythm: Normal rate and regular rhythm.   Pulmonary:      Effort: Pulmonary effort is normal. No respiratory distress.      Breath sounds: Normal breath sounds. No stridor. No wheezing or rhonchi.   Musculoskeletal:      Cervical back: Neck supple.   Lymphadenopathy:      Cervical: No cervical adenopathy.   Neurological:      General: No focal deficit present.      Mental Status: She is alert and oriented to person, place, and time.   Psychiatric:         Mood and Affect: Mood normal.         Behavior: Behavior normal.         Thought Content: Thought content normal.         Judgment: Judgment normal.           Diagnostic testing: None    Assessment/Plan:     Encounter Diagnoses   Name Primary?    TMJ dysfunction           Plan for care for today's complaint includes prednisone for left-sided TMJ dysfunction.  Continue over-the-counter anti-inflammatories for additional symptom support.  No evidence of acute otitis media on exam, TM tubes were well-appearing on exam today.  Continue to follow with primary care for work-up of possible rheumatologic pathology.  Return to urgent care follow-up emergency department if TMJ symptoms worsen or become severe..  Prescription for prednisone provided.    See AVS Instructions below for written guidance provided to patient on after-visit management and care in addition to our verbal discussion during the visit.    Please note that this dictation was created using voice recognition software. I have attempted to correct all errors, but there may be sound-alike, spelling, grammar and possibly content errors that I did not discover before finalizing the note.    Chan Louie PA-C

## 2023-05-10 ENCOUNTER — OFFICE VISIT (OUTPATIENT)
Dept: MEDICAL GROUP | Facility: PHYSICIAN GROUP | Age: 38
End: 2023-05-10
Payer: OTHER GOVERNMENT

## 2023-05-10 VITALS
RESPIRATION RATE: 14 BRPM | TEMPERATURE: 96.7 F | SYSTOLIC BLOOD PRESSURE: 138 MMHG | BODY MASS INDEX: 53.92 KG/M2 | HEIGHT: 62 IN | OXYGEN SATURATION: 94 % | DIASTOLIC BLOOD PRESSURE: 78 MMHG | WEIGHT: 293 LBS | HEART RATE: 98 BPM

## 2023-05-10 DIAGNOSIS — R06.02 SOBOE (SHORTNESS OF BREATH ON EXERTION): ICD-10-CM

## 2023-05-10 DIAGNOSIS — M25.50 ARTHRALGIA, UNSPECIFIED JOINT: ICD-10-CM

## 2023-05-10 DIAGNOSIS — M25.551 RIGHT HIP PAIN: ICD-10-CM

## 2023-05-10 DIAGNOSIS — L70.0 ACNE VULGARIS: ICD-10-CM

## 2023-05-10 PROBLEM — R68.89 FLU-LIKE SYMPTOMS: Status: RESOLVED | Noted: 2022-08-19 | Resolved: 2023-05-10

## 2023-05-10 PROCEDURE — 3078F DIAST BP <80 MM HG: CPT | Performed by: NURSE PRACTITIONER

## 2023-05-10 PROCEDURE — 3075F SYST BP GE 130 - 139MM HG: CPT | Performed by: NURSE PRACTITIONER

## 2023-05-10 PROCEDURE — 99214 OFFICE O/P EST MOD 30 MIN: CPT | Performed by: NURSE PRACTITIONER

## 2023-05-10 ASSESSMENT — FIBROSIS 4 INDEX: FIB4 SCORE: 0.12

## 2023-05-11 ASSESSMENT — ENCOUNTER SYMPTOMS
WEIGHT LOSS: 0
CHILLS: 0
BACK PAIN: 1
COUGH: 1
MYALGIAS: 1
FEVER: 1
HEADACHES: 0
DIZZINESS: 0
SHORTNESS OF BREATH: 1
FALLS: 1

## 2023-05-12 NOTE — PROGRESS NOTES
CC:  Chief Complaint   Patient presents with    Referral Needed       HISTORY OF PRESENT ILLNESS: Patient is a 37 y.o. female established patient presenting     Problem   Joint Pain    Onset of joint pain started 2018 after a hike  Over the last 3 months has worsened.   patient notes increase in joint pain and is requesting referral to rheumatology   Chronic joint pain in left jaw, right and left fingers, bilateral knees, right hip, left ankle, lower back, left shoulder, Fatigue, dry eyes, asthma, rashes.  She has been using Mobic 7.5 mg daily and 1000mg tylenol daily without relief, Causing her to have difficulty wanting to be active due to pain  Is having difficulty closing hands and fists due to swelling in hands for last month   History of sleep apnea which she recently started CPAP in February 2023.  History of fibroids and endometriosis resulting in a hysterectomy   Concerned that fluid in her ears which she had tubes placed for could be related to a autoimmune issues  Was recently seen in  for jaw pain and started on steroids which is states has helped significantly      Flu-Like Symptoms (Resolved)    Onset saturday of n/v   [place] fine Sunday and Monday   Tried to go to work today but started to have N/V/diarrhea, chills, fever, body aches  Tried to take tylenol but couldn't keep it down due to n/v  Has not been able to keep anything down today   Now just vomiting bile                    Review of Systems   Constitutional:  Positive for fever and malaise/fatigue. Negative for chills and weight loss.   HENT:  Positive for ear pain.    Respiratory:  Positive for cough and shortness of breath.    Cardiovascular:  Negative for chest pain.   Musculoskeletal:  Positive for back pain, falls, joint pain and myalgias.   Skin:  Positive for rash.   Neurological:  Negative for dizziness and headaches.             - NOTE: All other systems reviewed and are negative, except as in HPI.      Exam:    /78 (BP  "Location: Right arm, Patient Position: Sitting, BP Cuff Size: Adult)   Pulse 98   Temp 35.9 °C (96.7 °F) (Temporal)   Resp 14   Ht 1.575 m (5' 2\")   Wt (!) 133 kg (294 lb)   SpO2 94%  Body mass index is 53.77 kg/m².    Physical Exam  Constitutional:       General: She is not in acute distress.     Appearance: Normal appearance. She is obese. She is not ill-appearing.   HENT:      Head: Normocephalic and atraumatic.   Pulmonary:      Effort: Pulmonary effort is normal.   Musculoskeletal:         General: Signs of injury present.      Cervical back: Normal range of motion.      Comments: Left upper extremity in sling           Assessment/Plan:    1. Acne vulgaris  Chronic and stable condition  Patient would like referral to dermatology so they can evaluate her rash/acne  - Referral to Dermatology    2. Right hip pain  Chronic and stable condition  - Referral to Rheumatology  - CCP ANTIBODY; Future    3. Arthralgia, unspecified joint  Chronic and stable condition  - Referral to Rheumatology  - CCP ANTIBODY; Future    4. SOBOE (shortness of breath on exertion)  Chronic and stable condition  - proBrain Natriuretic Peptide, NT; Future       Discussed with patient possible alternative diagnoses, patient is to take all medications as prescribed.     If symptoms persist FU w/PCP, if symptoms worsen go to emergency room.     If experiencing any side effects from prescribed medications reports to the office immediately or go to emergency room.    Reviewed indication, dosage, usage and potential adverse effects of prescribed medications.     Reviewed risks and benefits of treatment plan. Patient verbalizes understanding of all instruction and verbally agrees to plan.      Return for Pending labs.      Please note that this dictation was created using voice recognition software. I have made every reasonable attempt to correct obvious errors, but I expect that there are errors of grammar and possibly content that I did not " discover before finalizing the note.

## 2023-05-22 DIAGNOSIS — M25.50 ARTHRALGIA, UNSPECIFIED JOINT: ICD-10-CM

## 2023-05-22 DIAGNOSIS — R76.8 POSITIVE ANA (ANTINUCLEAR ANTIBODY): ICD-10-CM

## 2023-05-30 ENCOUNTER — HOSPITAL ENCOUNTER (OUTPATIENT)
Dept: RADIOLOGY | Facility: MEDICAL CENTER | Age: 38
End: 2023-05-30
Payer: OTHER GOVERNMENT

## 2023-05-30 ENCOUNTER — OFFICE VISIT (OUTPATIENT)
Dept: URGENT CARE | Facility: CLINIC | Age: 38
End: 2023-05-30
Payer: OTHER GOVERNMENT

## 2023-05-30 VITALS
DIASTOLIC BLOOD PRESSURE: 96 MMHG | SYSTOLIC BLOOD PRESSURE: 136 MMHG | WEIGHT: 293 LBS | HEART RATE: 94 BPM | OXYGEN SATURATION: 99 % | TEMPERATURE: 98.6 F | BODY MASS INDEX: 53.92 KG/M2 | HEIGHT: 62 IN | RESPIRATION RATE: 16 BRPM

## 2023-05-30 DIAGNOSIS — J45.40 MODERATE PERSISTENT ASTHMA WITHOUT COMPLICATION: ICD-10-CM

## 2023-05-30 DIAGNOSIS — J06.9 UPPER RESPIRATORY TRACT INFECTION, UNSPECIFIED TYPE: ICD-10-CM

## 2023-05-30 DIAGNOSIS — R05.1 ACUTE COUGH: ICD-10-CM

## 2023-05-30 DIAGNOSIS — R50.9 FEVER, UNSPECIFIED FEVER CAUSE: ICD-10-CM

## 2023-05-30 DIAGNOSIS — R03.0 ELEVATED BP WITHOUT DIAGNOSIS OF HYPERTENSION: ICD-10-CM

## 2023-05-30 LAB
FLUAV RNA SPEC QL NAA+PROBE: NEGATIVE
FLUBV RNA SPEC QL NAA+PROBE: NEGATIVE
RSV RNA SPEC QL NAA+PROBE: NEGATIVE
S PYO DNA SPEC NAA+PROBE: NOT DETECTED
SARS-COV-2 RNA RESP QL NAA+PROBE: NEGATIVE

## 2023-05-30 PROCEDURE — 3080F DIAST BP >= 90 MM HG: CPT

## 2023-05-30 PROCEDURE — 71046 X-RAY EXAM CHEST 2 VIEWS: CPT

## 2023-05-30 PROCEDURE — 0241U POCT CEPHEID COV-2, FLU A/B, RSV - PCR: CPT

## 2023-05-30 PROCEDURE — 3075F SYST BP GE 130 - 139MM HG: CPT

## 2023-05-30 PROCEDURE — 87651 STREP A DNA AMP PROBE: CPT

## 2023-05-30 PROCEDURE — 99213 OFFICE O/P EST LOW 20 MIN: CPT

## 2023-05-30 RX ORDER — BENZONATATE 100 MG/1
100 CAPSULE ORAL 3 TIMES DAILY PRN
Qty: 60 CAPSULE | Refills: 0 | Status: SHIPPED | OUTPATIENT
Start: 2023-05-30 | End: 2023-07-31

## 2023-05-30 RX ORDER — ALBUTEROL SULFATE 90 UG/1
2 AEROSOL, METERED RESPIRATORY (INHALATION) EVERY 6 HOURS PRN
Qty: 8.5 G | Refills: 0 | Status: SHIPPED | OUTPATIENT
Start: 2023-05-30 | End: 2023-07-31

## 2023-05-30 RX ORDER — CETIRIZINE HYDROCHLORIDE 10 MG/1
10 TABLET ORAL DAILY
Qty: 30 TABLET | Refills: 0 | Status: SHIPPED | OUTPATIENT
Start: 2023-05-30 | End: 2023-07-31

## 2023-05-30 RX ORDER — BUDESONIDE AND FORMOTEROL FUMARATE DIHYDRATE 80; 4.5 UG/1; UG/1
2 AEROSOL RESPIRATORY (INHALATION) 2 TIMES DAILY
Qty: 1 EACH | Refills: 0 | Status: SHIPPED | OUTPATIENT
Start: 2023-05-30 | End: 2024-01-22

## 2023-05-30 ASSESSMENT — FIBROSIS 4 INDEX: FIB4 SCORE: 0.12

## 2023-05-31 NOTE — PROGRESS NOTES
Subjective:   Katherine Carlos is a 37 y.o. female who presents for Cough (X 1 week , cough , running nose. Sore throat, slight fever, )      HPI:    Patient presents to urgent care with concerns of rhinorrhea, nasal congestion, sore throat, and worsening cough.    Onset was 1 week ago  Reports SOB at rest and with activity, chest tightness  Has history of asthma, typically controlled with albuterol inhaler.  Last albuterol use was an hour ago  Denies use of a controller medication.   Denies wheezing  Has persistent fever (t max 101 F) controlled with Ibuprofen  Has bilateral PE tubes in place due to recurrent OM  Tolerating diet, normal urinary output  Has history of tonsillectomy but states her sore throat is persistent and has not improved since onset of her symptoms        ROS As above in HPI    Medications:    Current Outpatient Medications on File Prior to Visit   Medication Sig Dispense Refill    meloxicam (MOBIC) 7.5 MG Tab Take 1 Tablet by mouth every day. 30 Tablet 0    triamcinolone acetonide (KENALOG) 0.1 % Cream Apply 1 Application. topically 2 times a day as needed (rash, itch). 15 g 0    DHEA 50 MG Tab 1 tab(s)      buPROPion (WELLBUTRIN XL) 300 MG XL tablet Take 1 Tablet by mouth every morning. 90 Tablet 3    estradiol (ESTRACE) 2 MG Tab Take 1 Tablet by mouth every day.      albuterol 108 (90 Base) MCG/ACT Aero Soln inhalation aerosol Inhale 2 Puffs every 6 hours as needed for Shortness of Breath. 8.5 g 3    prazosin (MINIPRESS) 1 MG Cap Take 1 mg by mouth.      sertraline (ZOLOFT) 50 MG Tab Take 1 Tablet by mouth every day.      multivitamin (THERAGRAN) Tab Take 1 Tablet by mouth every day.      Cyanocobalamin (VITAMIN B-12 PO) Take 1 Tablet by mouth every day.       No current facility-administered medications on file prior to visit.        Allergies:   Chocolate, Citrus, Latex, Peanut oil, Tomato, Citrus calcium +d [calcium carbonate-vitamin d], Lac bovis, Milk protein extract,  "Acetaminophen, and Morphine    Problem List:   Patient Active Problem List   Diagnosis    History of partial hysterectomy    History of gestational diabetes    Class 3 severe obesity due to excess calories without serious comorbidity with body mass index (BMI) of 50.0 to 59.9 in adult (HCC)    Anxiety    Acne vulgaris    Chronic post-traumatic stress disorder (PTSD)    Mild episode of recurrent major depressive disorder (HCC)    Ear pain, left    Right lower quadrant abdominal pain    Hepatic steatosis    Right hip pain    Chronic cholecystitis    Asthma    Seasonal allergies    Obstructive sleep apnea syndrome    Joint pain        Surgical History:  Past Surgical History:   Procedure Laterality Date    HYSTERECTOMY LAPAROSCOPY  07/09/2021    still has ovaries    CHOLECYSTECTOMY  10/2020    REPEAT C SECTION W TUBAL LIGATION  9/9/2010    Performed by [facility]    PRIMARY C SECTION  2008    TONSILLECTOMY  at age 5       Past Social Hx:   Social History     Tobacco Use    Smoking status: Never    Smokeless tobacco: Never   Vaping Use    Vaping Use: Never used   Substance Use Topics    Alcohol use: Never    Drug use: Never     Comment:  CBD           Problem list, medications, and allergies reviewed by myself today in Epic.     Objective:     BP (!) 136/96 (BP Location: Right arm, Patient Position: Sitting, BP Cuff Size: Adult)   Pulse 94   Temp 37 °C (98.6 °F) (Temporal)   Resp 16   Ht 1.575 m (5' 2\")   Wt (!) 133 kg (293 lb)   LMP 04/09/2012   SpO2 99%   BMI 53.59 kg/m²     Physical Exam  Vitals and nursing note reviewed.   Constitutional:       Appearance: Normal appearance. She is ill-appearing.   HENT:      Head: Normocephalic and atraumatic.      Right Ear: Tympanic membrane and ear canal normal. No drainage, swelling or tenderness. No mastoid tenderness. A PE tube is present. Tympanic membrane is not injected or erythematous.      Left Ear: Tympanic membrane and ear canal " normal. No drainage, swelling or tenderness. No mastoid tenderness. A PE tube is present. Tympanic membrane is not injected or erythematous.      Nose: Congestion and rhinorrhea present. Rhinorrhea is clear.      Right Sinus: No maxillary sinus tenderness or frontal sinus tenderness.      Left Sinus: No maxillary sinus tenderness or frontal sinus tenderness.      Mouth/Throat:      Mouth: Mucous membranes are moist.      Pharynx: Uvula midline. Pharyngeal swelling and posterior oropharyngeal erythema present. No oropharyngeal exudate.      Tonsils: No tonsillar exudate or tonsillar abscesses. 1+ on the right. 1+ on the left.      Comments: Bilateral tonsils are appreciated  Eyes:      Extraocular Movements: Extraocular movements intact.      Conjunctiva/sclera:      Right eye: Hemorrhage present.      Comments: Left eye has subconjunctival hemorrhage    Cardiovascular:      Rate and Rhythm: Normal rate and regular rhythm.      Heart sounds: Normal heart sounds. No murmur heard.     No friction rub. No gallop.   Pulmonary:      Effort: Pulmonary effort is normal. No respiratory distress.      Breath sounds: No stridor. Examination of the right-upper field reveals decreased breath sounds. Examination of the right-middle field reveals decreased breath sounds. Decreased breath sounds present. No wheezing, rhonchi or rales.   Chest:      Chest wall: No tenderness.   Abdominal:      General: Bowel sounds are normal.      Palpations: Abdomen is soft.   Lymphadenopathy:      Cervical: No cervical adenopathy.   Skin:     General: Skin is warm and dry.      Capillary Refill: Capillary refill takes less than 2 seconds.      Findings: No rash.   Neurological:      Mental Status: She is alert and oriented to person, place, and time.         Assessment/Plan:     Diagnosis and associated orders:   1. Moderate persistent asthma without complication  - albuterol 108 (90 Base) MCG/ACT Aero Soln inhalation aerosol; Inhale 2 Puffs  every 6 hours as needed for Shortness of Breath.  Dispense: 8.5 g; Refill: 0  - budesonide-formoterol (SYMBICORT) 80-4.5 MCG/ACT Aerosol; Inhale 2 Puffs 2 times a day.  Dispense: 1 Each; Refill: 0  - benzonatate (TESSALON) 100 MG Cap; Take 1 Capsule by mouth 3 times a day as needed for Cough.  Dispense: 60 Capsule; Refill: 0  - cetirizine (ZYRTEC) 10 MG Tab; Take 1 Tablet by mouth every day.  Dispense: 30 Tablet; Refill: 0    2. Upper respiratory tract infection, unspecified type    3. Elevated BP without diagnosis of hypertension    4. Fever, unspecified fever cause  - DX-CHEST-2 VIEWS; Future  - POCT CEPHEID COV-2, FLU A/B, RSV - PCR  - POCT CEPHEID GROUP A STREP - PCR    5. Acute cough  - DX-CHEST-2 VIEWS; Future        Comments/MDM:     Cepheid viral swab and strep are negative  Per radiology impression, no acute cardiopulmonary processes are appreciated  Likely viral  Will start patient on Symbicort and refill her albuterol inhaler  Patient advised to continue to monitor and record her BP at home three times a day.  Also recommended antihistamines, Flonase, warm humidification, nasal saline rinses  Return to UC If no improvement in symptoms or if symptoms worsen  Strict return to ER precautions reviewed  Follow up with PCP regarding elevated BP     Please note that this dictation was created using voice recognition software. I have made a reasonable attempt to correct obvious errors, but I expect that there are errors of grammar and possibly content that I did not discover before finalizing the note.    This note was electronically signed by ARMANDO Magdaleno, FNP-C

## 2023-06-11 ENCOUNTER — OFFICE VISIT (OUTPATIENT)
Dept: URGENT CARE | Facility: CLINIC | Age: 38
End: 2023-06-11
Payer: OTHER GOVERNMENT

## 2023-06-11 VITALS
DIASTOLIC BLOOD PRESSURE: 92 MMHG | TEMPERATURE: 96.9 F | HEART RATE: 96 BPM | HEIGHT: 62 IN | SYSTOLIC BLOOD PRESSURE: 140 MMHG | WEIGHT: 291.4 LBS | BODY MASS INDEX: 53.62 KG/M2 | OXYGEN SATURATION: 95 % | RESPIRATION RATE: 16 BRPM

## 2023-06-11 DIAGNOSIS — J01.00 ACUTE NON-RECURRENT MAXILLARY SINUSITIS: ICD-10-CM

## 2023-06-11 DIAGNOSIS — F32.A DEPRESSION, UNSPECIFIED DEPRESSION TYPE: ICD-10-CM

## 2023-06-11 PROCEDURE — 99214 OFFICE O/P EST MOD 30 MIN: CPT | Performed by: PHYSICIAN ASSISTANT

## 2023-06-11 PROCEDURE — 3077F SYST BP >= 140 MM HG: CPT | Performed by: PHYSICIAN ASSISTANT

## 2023-06-11 PROCEDURE — 3080F DIAST BP >= 90 MM HG: CPT | Performed by: PHYSICIAN ASSISTANT

## 2023-06-11 RX ORDER — AMOXICILLIN AND CLAVULANATE POTASSIUM 875; 125 MG/1; MG/1
1 TABLET, FILM COATED ORAL 2 TIMES DAILY
Qty: 14 TABLET | Refills: 0 | Status: SHIPPED | OUTPATIENT
Start: 2023-06-11 | End: 2023-06-18

## 2023-06-11 ASSESSMENT — ENCOUNTER SYMPTOMS
DIZZINESS: 0
SINUS PAIN: 0
WHEEZING: 0
HEADACHES: 0
SORE THROAT: 1
SPUTUM PRODUCTION: 0
ABDOMINAL PAIN: 0
COUGH: 1
VOMITING: 0
FEVER: 1
NAUSEA: 0
MYALGIAS: 1
CHILLS: 1
SHORTNESS OF BREATH: 0
DIARRHEA: 0
DIAPHORESIS: 0
PALPITATIONS: 0

## 2023-06-11 ASSESSMENT — FIBROSIS 4 INDEX: FIB4 SCORE: 0.12

## 2023-06-11 NOTE — PROGRESS NOTES
Subjective:     CHIEF COMPLAINT  Chief Complaint   Patient presents with    Cough     Congestion, fever of 101.8, loss of taste, sore throat, body aches x mid may        HPI  Katherine Carlos is a very pleasant 37 y.o. female who presents to the clinic with persistent URI-like symptoms x3 weeks.  Patient recently seen in clinic on 5/30/2023.  At that time tested negative for strep, flu, COVID and RSV.  Chest x-ray also preformed that returned normal.  She was advised to start on cetirizine and a steroid inhaler.  Steroid inhaler has provided moderate improvement.  She has not started on her antihistamine.  Continues to have persistent sinus congestion with occasional sore throat and cough.  Cough is dry nonproductive.  No shortness of breath or chest pain.  Describes intermittent body aches, chills and fever over this time.  Denies any history of seasonal allergies.    REVIEW OF SYSTEMS  Review of Systems   Constitutional:  Positive for chills, fever and malaise/fatigue. Negative for diaphoresis.   HENT:  Positive for congestion and sore throat. Negative for ear pain and sinus pain.    Respiratory:  Positive for cough. Negative for sputum production, shortness of breath and wheezing.    Cardiovascular:  Negative for chest pain and palpitations.   Gastrointestinal:  Negative for abdominal pain, diarrhea, nausea and vomiting.   Musculoskeletal:  Positive for myalgias.   Neurological:  Negative for dizziness and headaches.   Endo/Heme/Allergies:  Negative for environmental allergies.       PAST MEDICAL HISTORY  Patient Active Problem List    Diagnosis Date Noted    Joint pain 05/10/2023    Obstructive sleep apnea syndrome 11/15/2022    Chronic cholecystitis 06/03/2022    Asthma 05/23/2022    Seasonal allergies 05/23/2022    Right hip pain 03/29/2022    Right lower quadrant abdominal pain 03/22/2022    Hepatic steatosis 03/22/2022    Ear pain, left 02/18/2022    Chronic post-traumatic stress disorder (PTSD)  "01/11/2022    Mild episode of recurrent major depressive disorder (HCC) 01/11/2022    Anxiety 10/01/2021    Acne vulgaris 10/01/2021    History of partial hysterectomy 08/25/2021    History of gestational diabetes 08/25/2021    Class 3 severe obesity due to excess calories without serious comorbidity with body mass index (BMI) of 50.0 to 59.9 in adult (HCC) 08/25/2021       SURGICAL HISTORY   has a past surgical history that includes tonsillectomy (at age 5); primary c section (2008); repeat c section w tubal ligation (9/9/2010); cholecystectomy (10/2020); and hysterectomy laparoscopy (07/09/2021).    ALLERGIES  Allergies   Allergen Reactions    Chocolate Hives, Diarrhea and Itching     tongue itching      Citrus Hives and Nausea     tongue itching    Latex Hives and Unspecified     Skin burns.      Peanut Oil Hives and Itching     tongue itching      Tomato Itching     Itchy tongue      Citrus Calcium +D [Calcium Carbonate-Vitamin D] Unspecified    Lac Bovis Unspecified    Milk Protein Extract      Pt reports that she gets gassy     Acetaminophen Nausea     \"Upset stomach when taken on empty stomach\"    Morphine Vomiting       CURRENT MEDICATIONS  Home Medications       Reviewed by Holden Diaz P.A.-C. (Physician Assistant) on 06/11/23 at 1236  Med List Status: <None>     Medication Last Dose Status   albuterol 108 (90 Base) MCG/ACT Aero Soln inhalation aerosol PRN Active   albuterol 108 (90 Base) MCG/ACT Aero Soln inhalation aerosol PRN Active   benzonatate (TESSALON) 100 MG Cap Not Taking Active   budesonide-formoterol (SYMBICORT) 80-4.5 MCG/ACT Aerosol Taking Active   buPROPion (WELLBUTRIN XL) 300 MG XL tablet Taking Active   cetirizine (ZYRTEC) 10 MG Tab Taking Active   Cyanocobalamin (VITAMIN B-12 PO) Taking Active   DHEA 50 MG Tab Taking Active   estradiol (ESTRACE) 2 MG Tab Taking Active   meloxicam (MOBIC) 7.5 MG Tab Taking Active   multivitamin (THERAGRAN) Tab Taking Active   prazosin (MINIPRESS) 1 MG " "Cap Taking Active   sertraline (ZOLOFT) 50 MG Tab Taking Active   triamcinolone acetonide (KENALOG) 0.1 % Cream Taking Active                    SOCIAL HISTORY  Social History     Tobacco Use    Smoking status: Never    Smokeless tobacco: Never   Vaping Use    Vaping Use: Never used   Substance and Sexual Activity    Alcohol use: Never    Drug use: Never     Comment:  CBD     Sexual activity: Yes     Partners: Male       FAMILY HISTORY  Family History   Problem Relation Age of Onset    Lupus Mother     Heart Disease Mother     Diabetes Father     Heart Disease Father     Diabetes Brother     Heart Disease Maternal Uncle         MI    Diabetes Maternal Grandfather     Parkinson's Disease Paternal Grandmother     Diabetes Paternal Grandmother     Cancer Sister         cervical cancer    Alcohol abuse Paternal Aunt     Heart Disease Paternal Grandfather           Objective:     VITAL SIGNS: BP (!) 140/92 (BP Location: Left arm, Patient Position: Sitting, BP Cuff Size: Large adult)   Pulse 96   Temp 36.1 °C (96.9 °F) (Temporal)   Resp 16   Ht 1.575 m (5' 2\")   Wt (!) 132 kg (291 lb 6.4 oz)   LMP 04/09/2012   SpO2 95%   BMI 53.30 kg/m²     PHYSICAL EXAM  Physical Exam  Constitutional:       General: She is not in acute distress.     Appearance: Normal appearance. She is not ill-appearing, toxic-appearing or diaphoretic.   HENT:      Head: Normocephalic and atraumatic.      Right Ear: Tympanic membrane, ear canal and external ear normal.      Left Ear: Tympanic membrane, ear canal and external ear normal.      Nose: Congestion and rhinorrhea present.      Mouth/Throat:      Mouth: Mucous membranes are moist.      Pharynx: No oropharyngeal exudate or posterior oropharyngeal erythema.   Eyes:      Conjunctiva/sclera: Conjunctivae normal.   Cardiovascular:      Rate and Rhythm: Normal rate and regular rhythm.      Pulses: Normal pulses.      Heart sounds: Normal heart sounds.   Pulmonary:      Effort: Pulmonary " effort is normal. No respiratory distress.      Breath sounds: Normal breath sounds. No wheezing, rhonchi or rales.   Musculoskeletal:      Cervical back: Normal range of motion. No muscular tenderness.   Lymphadenopathy:      Cervical: No cervical adenopathy.   Skin:     General: Skin is warm and dry.      Capillary Refill: Capillary refill takes less than 2 seconds.   Neurological:      Mental Status: She is alert.   Psychiatric:         Mood and Affect: Mood normal.         Thought Content: Thought content normal.         Assessment/Plan:     1. Acute non-recurrent maxillary sinusitis  amoxicillin-clavulanate (AUGMENTIN) 875-125 MG Tab      2. Depression, unspecified depression type  sertraline (ZOLOFT) 50 MG Tab            MDM/Comments:    Well-appearing 37-year-old female presenting with intermittent URI-like symptoms x3 weeks.  At this time symptoms do appear more consistent with seasonal allergic rhinitis.  Advised OTC antihistamine use and Flonase.  Contingent antibiotic provided due to the nature and length of illness.  Lung sounds clear.    -Nasal spray and allergy medications as directed (Zyrtec or Loratadine).  -Advised starting Flonase 2 sprays per nostril 1 time daily.  -You may try saline irrigation or neti pot.   -Drink plenty of fluids.  -Ibuprofen or Tylenol as directed for pain.   -Warm compress to sinuses.   -Contingent antibiotic prescription given to patient to fill upon meeting criteria of guidelines discussed.     Follow up with primary care provider. Urgently for worsening symptoms, persistent fevers, facial swelling, visual changes, weakness, elevated heart rate, stiff neck, symptoms last longer than 10 days, or any other concerns.      Differential diagnosis, natural history, supportive care, and indications for immediate follow-up discussed. All questions answered. Patient agrees with the plan of care.    Follow-up as needed if symptoms worsen or fail to improve to PCP, Urgent care or  Emergency Room.    I have personally reviewed prior external notes and test results pertinent to today's visit.  I have independently reviewed and interpreted all diagnostics ordered during this urgent care acute visit.   Discussed management options (risks,benefits, and alternatives to treatment). Pt expresses understanding and the treatment plan was agreed upon. Questions were encouraged and answered to pt's satisfaction.    Please note that this dictation was created using voice recognition software. I have made a reasonable attempt to correct obvious errors, but I expect that there are errors of grammar and possibly content that I did not discover before finalizing the note.

## 2023-07-21 DIAGNOSIS — F32.A DEPRESSION, UNSPECIFIED DEPRESSION TYPE: ICD-10-CM

## 2023-07-31 ENCOUNTER — OFFICE VISIT (OUTPATIENT)
Dept: MEDICAL GROUP | Facility: PHYSICIAN GROUP | Age: 38
End: 2023-07-31
Payer: COMMERCIAL

## 2023-07-31 VITALS
TEMPERATURE: 96.5 F | HEART RATE: 94 BPM | RESPIRATION RATE: 16 BRPM | HEIGHT: 62 IN | WEIGHT: 293 LBS | SYSTOLIC BLOOD PRESSURE: 144 MMHG | OXYGEN SATURATION: 97 % | DIASTOLIC BLOOD PRESSURE: 98 MMHG | BODY MASS INDEX: 53.92 KG/M2

## 2023-07-31 DIAGNOSIS — M70.61 TROCHANTERIC BURSITIS OF RIGHT HIP: ICD-10-CM

## 2023-07-31 DIAGNOSIS — M25.562 CHRONIC PAIN OF BOTH KNEES: ICD-10-CM

## 2023-07-31 DIAGNOSIS — M25.551 RIGHT HIP PAIN: ICD-10-CM

## 2023-07-31 DIAGNOSIS — M25.561 CHRONIC PAIN OF BOTH KNEES: ICD-10-CM

## 2023-07-31 DIAGNOSIS — G89.29 CHRONIC PAIN OF BOTH KNEES: ICD-10-CM

## 2023-07-31 PROCEDURE — 3077F SYST BP >= 140 MM HG: CPT | Performed by: STUDENT IN AN ORGANIZED HEALTH CARE EDUCATION/TRAINING PROGRAM

## 2023-07-31 PROCEDURE — 99214 OFFICE O/P EST MOD 30 MIN: CPT | Performed by: STUDENT IN AN ORGANIZED HEALTH CARE EDUCATION/TRAINING PROGRAM

## 2023-07-31 PROCEDURE — 3080F DIAST BP >= 90 MM HG: CPT | Performed by: STUDENT IN AN ORGANIZED HEALTH CARE EDUCATION/TRAINING PROGRAM

## 2023-07-31 RX ORDER — DICLOFENAC SODIUM 75 MG/1
75 TABLET, DELAYED RELEASE ORAL 2 TIMES DAILY PRN
Qty: 120 TABLET | Refills: 2 | Status: SHIPPED | OUTPATIENT
Start: 2023-07-31 | End: 2024-01-22 | Stop reason: SDUPTHER

## 2023-07-31 ASSESSMENT — FIBROSIS 4 INDEX: FIB4 SCORE: 0.3

## 2023-08-01 PROBLEM — M25.562 CHRONIC PAIN OF BOTH KNEES: Status: ACTIVE | Noted: 2023-08-01

## 2023-08-01 PROBLEM — G89.29 CHRONIC PAIN OF BOTH KNEES: Status: ACTIVE | Noted: 2023-08-01

## 2023-08-01 PROBLEM — M25.561 CHRONIC PAIN OF BOTH KNEES: Status: ACTIVE | Noted: 2023-08-01

## 2023-08-01 NOTE — ASSESSMENT & PLAN NOTE
Bilateral knee x-rays ordered, likely has some patellar arthritis.  She will need to work on losing weight, consider trial of physical therapy.  We will discontinue Mobic, will try Voltaren.  Advised that Tylenol with codeine is a medication with a recommend that she avoid narcotic opiate medications

## 2023-08-01 NOTE — PROGRESS NOTES
HISTORY OF PRESENT ILLNESS: Katherine is a pleasant 37 y.o. female, established patient who presents today to discuss medical problems as listed below:    #Bilateral hip pain  #Bilateral knee pain  Patient has chronic joint pains, being worked up by rheumatology for connective tissue disorder.  She is in today because she has been having increasing trouble walking due to her hip pain right greater than left and bilateral knee pain.  She heard that she could try Tylenol with codeine to help with the pain and she would like a prescription of this.  She reports that she had an MRI of her hip done on the right side showing a slight tear and bursitis, she was seeing the renal orthopedic clinic at that time.  She has not had any imaging of her knees    Denies any fevers, chills, nausea, vomiting, chest pain or shortness of breath.    Current Outpatient Medications Ordered in Epic   Medication Sig Dispense Refill    diclofenac DR (VOLTAREN) 75 MG Tablet Delayed Response Take 1 Tablet by mouth 2 times a day as needed (moderate prn). 120 Tablet 2    sertraline (ZOLOFT) 50 MG Tab Take 1 Tablet by mouth every day. 30 Tablet 0    budesonide-formoterol (SYMBICORT) 80-4.5 MCG/ACT Aerosol Inhale 2 Puffs 2 times a day. 1 Each 0    triamcinolone acetonide (KENALOG) 0.1 % Cream Apply 1 Application. topically 2 times a day as needed (rash, itch). 15 g 0    buPROPion (WELLBUTRIN XL) 300 MG XL tablet Take 1 Tablet by mouth every morning. 90 Tablet 3    estradiol (ESTRACE) 2 MG Tab Take 1 Tablet by mouth every day.      albuterol 108 (90 Base) MCG/ACT Aero Soln inhalation aerosol Inhale 2 Puffs every 6 hours as needed for Shortness of Breath. 8.5 g 3    multivitamin (THERAGRAN) Tab Take 1 Tablet by mouth every day.      Cyanocobalamin (VITAMIN B-12 PO) Take 1 Tablet by mouth every day.      meloxicam (MOBIC) 7.5 MG Tab TAKE 1 TABLET BY MOUTH EVERY DAY (Patient not taking: Reported on 7/31/2023) 30 Tablet 0    DHEA 50 MG Tab 1 tab(s)        No current Epic-ordered facility-administered medications on file.       Review of systems:  Per HPI    Past Medical History:   Diagnosis Date    ASTHMA     medications not needed    FHx: cleft palate 2010    FHx: cleft palate 2010    Mother, brother, uncle (maternal), and aunt (maternal) with cleft palate     GBS (group B Streptococcus carrier), +RV culture, currently pregnant 2010    History of  2010    History of COVID-19 2021    History of macrosomia in infant in prior pregnancy, currently pregnant 2010    History of macrosomia in infant in prior pregnancy, currently pregnant 2010    9lb5oz     Hypoglycemia     at 16 years old    Previous  section- desires Repeat 2010    SUPERVISION OF HIGH-RISK PREGNANCY 2010    SUPERVISION OF HIGH-RISK PREGNANCY 2010     Past Surgical History:   Procedure Laterality Date    HYSTERECTOMY LAPAROSCOPY  2021    still has ovaries    CHOLECYSTECTOMY  10/2020    REPEAT C SECTION W TUBAL LIGATION  2010    Performed by [facility] at [facility]    PRIMARY C SECTION  2008    TONSILLECTOMY  at age 5     Social History     Tobacco Use    Smoking status: Never    Smokeless tobacco: Never   Vaping Use    Vaping Use: Never used   Substance Use Topics    Alcohol use: Never    Drug use: Never     Comment:  CBD       Family History   Problem Relation Age of Onset    Lupus Mother     Heart Disease Mother     Diabetes Father     Heart Disease Father     Diabetes Brother     Heart Disease Maternal Uncle         MI    Diabetes Maternal Grandfather     Parkinson's Disease Paternal Grandmother     Diabetes Paternal Grandmother     Cancer Sister         cervical cancer    Alcohol abuse Paternal Aunt     Heart Disease Paternal Grandfather      Current Outpatient Medications   Medication Sig Dispense Refill    diclofenac DR (VOLTAREN) 75 MG Tablet Delayed Response Take 1 Tablet by  "mouth 2 times a day as needed (moderate prn). 120 Tablet 2    sertraline (ZOLOFT) 50 MG Tab Take 1 Tablet by mouth every day. 30 Tablet 0    budesonide-formoterol (SYMBICORT) 80-4.5 MCG/ACT Aerosol Inhale 2 Puffs 2 times a day. 1 Each 0    triamcinolone acetonide (KENALOG) 0.1 % Cream Apply 1 Application. topically 2 times a day as needed (rash, itch). 15 g 0    buPROPion (WELLBUTRIN XL) 300 MG XL tablet Take 1 Tablet by mouth every morning. 90 Tablet 3    estradiol (ESTRACE) 2 MG Tab Take 1 Tablet by mouth every day.      albuterol 108 (90 Base) MCG/ACT Aero Soln inhalation aerosol Inhale 2 Puffs every 6 hours as needed for Shortness of Breath. 8.5 g 3    multivitamin (THERAGRAN) Tab Take 1 Tablet by mouth every day.      Cyanocobalamin (VITAMIN B-12 PO) Take 1 Tablet by mouth every day.      meloxicam (MOBIC) 7.5 MG Tab TAKE 1 TABLET BY MOUTH EVERY DAY (Patient not taking: Reported on 7/31/2023) 30 Tablet 0    DHEA 50 MG Tab 1 tab(s)       No current facility-administered medications for this visit.       Allergies:  Allergies   Allergen Reactions    Chocolate Hives, Diarrhea and Itching     tongue itching      Citrus Hives and Nausea     tongue itching    Latex Hives and Unspecified     Skin burns.      Peanut Oil Hives and Itching     tongue itching      Tomato Itching     Itchy tongue      Citrus Calcium +D [Calcium Carbonate-Vitamin D] Unspecified    Lac Bovis Unspecified    Milk Protein Extract      Pt reports that she gets gassy     Acetaminophen Nausea     \"Upset stomach when taken on empty stomach\"    Morphine Vomiting       Allergies, past medical history, past surgical history, family history, social history reviewed and updated.    Objective:    BP (!) 144/98 (BP Location: Left arm, Patient Position: Sitting, BP Cuff Size: Large adult)   Pulse 94   Temp 35.8 °C (96.5 °F) (Temporal)   Resp 16   Ht 1.575 m (5' 2\")   Wt (!) 135 kg (296 lb 11.8 oz)   LMP 04/09/2012   SpO2 97%   BMI 54.27 kg/m²  "   Body mass index is 54.27 kg/m².    Physical exam:  General: Normal appearance, no acute distress, not ill-appearing  HEENT: EOM intact, conjunctiva normal limits, negative right/left eye discharge.  Sclera anicteric  Cardiovascular: Normal rate and rhythm, no murmurs  Pulmonary: No respiratory distress, no wheezing, no rales, breath sounds normal.  Musculoskeletal: Right hip with tenderness over the greater trochanter right side, pain with internal/external rotation.  Bilateral knees with no significant erythema or swelling, pain with palpation around the area of the patella medial and lateral.,  Negative provocative testing for anterior posterior drawer test, negative varus valgus stress test.  Skin: Warm, dry, no lesions  Neurological: No focal deficits, normal gait  Psychiatric: Mood within normal limits    CRP 2.4  Sed rate 24    Assessment/Plan:    Problem List Items Addressed This Visit       Right hip pain     Inflammatory labs are weakly positive for CRP, negative sed rate.  I reviewed the MRI of her hip done last year, recommend trying a joint injection for trochanteric bursitis as her pain is likely secondary to greater trochanter syndrome         Chronic pain of both knees     Bilateral knee x-rays ordered, likely has some patellar arthritis.  She will need to work on losing weight, consider trial of physical therapy.  We will discontinue Mobic, will try Voltaren.  Advised that Tylenol with codeine is a medication with a recommend that she avoid narcotic opiate medications         Relevant Medications    diclofenac DR (VOLTAREN) 75 MG Tablet Delayed Response    Other Relevant Orders    DX-KNEE 3 VIEWS Atraumatic Pain/Swelling/Deformity    DX-KNEE 3 VIEWS Atraumatic Pain/Swelling/Deformity     Other Visit Diagnoses       Trochanteric bursitis of right hip        Relevant Orders    Referral to Orthopedics             Return in about 4 weeks (around 8/28/2023) for symptoms.

## 2023-08-01 NOTE — ASSESSMENT & PLAN NOTE
Inflammatory labs are weakly positive for CRP, negative sed rate.  I reviewed the MRI of her hip done last year, recommend trying a joint injection for trochanteric bursitis as her pain is likely secondary to greater trochanter syndrome

## 2023-08-08 ENCOUNTER — OFFICE VISIT (OUTPATIENT)
Dept: MEDICAL GROUP | Facility: PHYSICIAN GROUP | Age: 38
End: 2023-08-08
Payer: COMMERCIAL

## 2023-08-08 VITALS
WEIGHT: 293 LBS | RESPIRATION RATE: 16 BRPM | OXYGEN SATURATION: 94 % | BODY MASS INDEX: 53.92 KG/M2 | HEIGHT: 62 IN | DIASTOLIC BLOOD PRESSURE: 90 MMHG | TEMPERATURE: 97.4 F | SYSTOLIC BLOOD PRESSURE: 144 MMHG | HEART RATE: 92 BPM

## 2023-08-08 DIAGNOSIS — F43.12 CHRONIC POST-TRAUMATIC STRESS DISORDER (PTSD): ICD-10-CM

## 2023-08-08 DIAGNOSIS — Z02.9 ADMINISTRATIVE ENCOUNTER: ICD-10-CM

## 2023-08-08 DIAGNOSIS — E66.01 CLASS 3 SEVERE OBESITY DUE TO EXCESS CALORIES WITHOUT SERIOUS COMORBIDITY WITH BODY MASS INDEX (BMI) OF 50.0 TO 59.9 IN ADULT (HCC): ICD-10-CM

## 2023-08-08 DIAGNOSIS — F33.0 MILD EPISODE OF RECURRENT MAJOR DEPRESSIVE DISORDER (HCC): ICD-10-CM

## 2023-08-08 PROBLEM — F32.A DEPRESSIVE DISORDER: Status: ACTIVE | Noted: 2022-05-22

## 2023-08-08 PROCEDURE — 3077F SYST BP >= 140 MM HG: CPT | Performed by: NURSE PRACTITIONER

## 2023-08-08 PROCEDURE — 3080F DIAST BP >= 90 MM HG: CPT | Performed by: NURSE PRACTITIONER

## 2023-08-08 PROCEDURE — 99214 OFFICE O/P EST MOD 30 MIN: CPT | Performed by: NURSE PRACTITIONER

## 2023-08-08 ASSESSMENT — FIBROSIS 4 INDEX: FIB4 SCORE: 0.3

## 2023-08-10 ASSESSMENT — ENCOUNTER SYMPTOMS
DIZZINESS: 0
FEVER: 0
WEIGHT LOSS: 0
WEAKNESS: 0
DEPRESSION: 1
NERVOUS/ANXIOUS: 1
BACK PAIN: 1
CHILLS: 0
SHORTNESS OF BREATH: 1
HEADACHES: 0

## 2023-08-10 NOTE — PROGRESS NOTES
CC:  Chief Complaint   Patient presents with    Paperwork     FMLA    Blood Pressure Problem       HISTORY OF PRESENT ILLNESS: Patient is a 37 y.o. female established patient presenting     Problem   Depressive Disorder   Chronic Post-Traumatic Stress Disorder (Ptsd)    Patient continues with Wellbutrin 300 mg in the morning, sertraline 50 mg daily.    She notes that she has been having an increase in stress at work due to driving to work and the amount of people that are on the road.  She is requesting to get FMLA as well as paperwork in order for her to build to work from home to help relieve her PTSD.     Class 3 Severe Obesity Due to Excess Calories Without Serious Comorbidity With Body Mass Index (Bmi) of 50.0 to 59.9 in Adult ([facility])    BMI 54.47   Weight 297   Patient was following with [facility] [place] however when she had a psych evaluation psych informed her that they did not feel that she would be appropriate for bariatric surgery.  She states at that time the surgery was then placed on hold.  She notes that she continues to have joint pain and difficulty walking upstairs due to reduced exercise ability.    She notes that this has become some that is affecting her ADLs   We discussed possibly looking at doing a referral to bariatric surgery            Review of Systems   Constitutional:  Positive for malaise/fatigue. Negative for chills, fever and weight loss.   Respiratory:  Positive for shortness of breath.         SOBOE   Cardiovascular:  Negative for chest pain and leg swelling.   Musculoskeletal:  Positive for back pain and joint pain.   Neurological:  Negative for dizziness, weakness and headaches.   Psychiatric/Behavioral:  Positive for depression. The patient is nervous/anxious.              - NOTE: All other systems reviewed and are negative, except as in HPI.      Exam:    BP (!) 144/90 (BP Location: Left arm, Patient Position: Sitting, BP Cuff Size: Adult)   Pulse 92   Temp 36.3 °C  "(97.4 °F) (Temporal)   Resp 16   Ht 1.575 m (5' 2\")   Wt (!) 135 kg (297 lb 12.8 oz)   SpO2 94%  Body mass index is 54.47 kg/m².    Physical Exam  Constitutional:       Appearance: Normal appearance.   HENT:      Head: Normocephalic and atraumatic.   Pulmonary:      Effort: Pulmonary effort is normal.   Neurological:      Mental Status: She is alert and oriented to person, place, and time.   Psychiatric:         Behavior: Behavior normal.           Assessment/Plan:    1. Class 3 severe obesity due to excess calories without serious comorbidity with body mass index (BMI) of 50.0 to 59.9 in adult (HCC)  Chronic and exacerbated condition  We will place new referral to bariatric surgery as well as psychiatry this evening of the process started back up.  Patient could possibly benefit from her other elements with change in weight  - Referral to Psychiatry  - Referral to Bariatric Surgery    2. Mild episode of recurrent major depressive disorder (HCC)  Chronic and stable condition  - Referral to Psychiatry    3. Chronic post-traumatic stress disorder (PTSD)  Chronic and stable condition  - Referral to Psychiatry    4. Administrative encounter  Chronic and stable condition  Filled out paperwork for patient and provided back to patient       Discussed with patient possible alternative diagnoses, patient is to take all medications as prescribed.     If symptoms persist FU w/PCP, if symptoms worsen go to emergency room.     If experiencing any side effects from prescribed medications reports to the office immediately or go to emergency room.    Reviewed indication, dosage, usage and potential adverse effects of prescribed medications.     Reviewed risks and benefits of treatment plan. Patient verbalizes understanding of all instruction and verbally agrees to plan.      Return for Pending referrals.      Please note that this dictation was created using voice recognition software. I have made every reasonable attempt to " correct obvious errors, but I expect that there are errors of grammar and possibly content that I did not discover before finalizing the note.

## 2023-08-12 DIAGNOSIS — F32.A DEPRESSION, UNSPECIFIED DEPRESSION TYPE: ICD-10-CM

## 2023-08-14 NOTE — TELEPHONE ENCOUNTER
Received request via: Patient    Was the patient seen in the last year in this department? Yes    Does the patient have an active prescription (recently filled or refills available) for medication(s) requested? No    Does the patient have [facility] and need 100 day supply (blood pressure, diabetes and cholesterol meds only)? Patient does not have SCP    Last office visit 08/08/23  Last labs 0/31/23

## 2023-10-10 DIAGNOSIS — F32.A DEPRESSION, UNSPECIFIED DEPRESSION TYPE: ICD-10-CM

## 2023-10-10 NOTE — TELEPHONE ENCOUNTER
REQUEST FOR 90 DAYS PRESCRIPTION. DX Code Needed    Received request via: Pharmacy    Was the patient seen in the last year in this department? Yes    Does the patient have an active prescription (recently filled or refills available) for medication(s) requested? No    Does the patient have [facility] and need 100 day supply (blood pressure, diabetes and cholesterol meds only)? Patient does not have SCP

## 2023-10-13 ENCOUNTER — OFFICE VISIT (OUTPATIENT)
Dept: MEDICAL GROUP | Facility: PHYSICIAN GROUP | Age: 38
End: 2023-10-13
Payer: COMMERCIAL

## 2023-10-13 VITALS
RESPIRATION RATE: 12 BRPM | HEIGHT: 62 IN | HEART RATE: 83 BPM | SYSTOLIC BLOOD PRESSURE: 136 MMHG | BODY MASS INDEX: 53.92 KG/M2 | DIASTOLIC BLOOD PRESSURE: 84 MMHG | TEMPERATURE: 98.2 F | WEIGHT: 293 LBS | OXYGEN SATURATION: 97 %

## 2023-10-13 DIAGNOSIS — F33.0 MILD EPISODE OF RECURRENT MAJOR DEPRESSIVE DISORDER (HCC): ICD-10-CM

## 2023-10-13 DIAGNOSIS — Z23 NEED FOR VACCINATION: ICD-10-CM

## 2023-10-13 PROCEDURE — 99214 OFFICE O/P EST MOD 30 MIN: CPT | Mod: 25 | Performed by: NURSE PRACTITIONER

## 2023-10-13 PROCEDURE — 90677 PCV20 VACCINE IM: CPT | Performed by: NURSE PRACTITIONER

## 2023-10-13 PROCEDURE — 90746 HEPB VACCINE 3 DOSE ADULT IM: CPT | Performed by: NURSE PRACTITIONER

## 2023-10-13 PROCEDURE — 3079F DIAST BP 80-89 MM HG: CPT | Performed by: NURSE PRACTITIONER

## 2023-10-13 PROCEDURE — 90472 IMMUNIZATION ADMIN EACH ADD: CPT | Performed by: NURSE PRACTITIONER

## 2023-10-13 PROCEDURE — 3075F SYST BP GE 130 - 139MM HG: CPT | Performed by: NURSE PRACTITIONER

## 2023-10-13 PROCEDURE — 90471 IMMUNIZATION ADMIN: CPT | Performed by: NURSE PRACTITIONER

## 2023-10-13 ASSESSMENT — ENCOUNTER SYMPTOMS
WEIGHT LOSS: 0
DEPRESSION: 0
FEVER: 0
HEADACHES: 0
SHORTNESS OF BREATH: 0
DIZZINESS: 0
NERVOUS/ANXIOUS: 0
INSOMNIA: 0
CHILLS: 0

## 2023-10-13 ASSESSMENT — FIBROSIS 4 INDEX: FIB4 SCORE: 0.3

## 2023-10-14 NOTE — PROGRESS NOTES
"CC:  Chief Complaint   Patient presents with    Follow-Up     Would like to stop taking antidepressants        HISTORY OF PRESENT ILLNESS: Patient is a 37 y.o. female established patient presenting     Problem   Mild Episode of Recurrent Major Depressive Disorder (Hcc)    Currently takes sertraline 50mg daily, wellbutrin 300 mg daily   States he has been on a calorie deficit diet and has not lost any weight   Feels that the medication is her causing her to maintain and/or gain weight  She wants to be off this medication to see if she see can lose weight   Notes she is feeling in a better place and feels that if she comes off these medications she will be able to tolerate.              Review of Systems   Constitutional:  Positive for malaise/fatigue. Negative for chills, fever and weight loss.   Respiratory:  Negative for shortness of breath.    Cardiovascular:  Negative for chest pain.   Neurological:  Negative for dizziness and headaches.   Psychiatric/Behavioral:  Negative for depression and suicidal ideas. The patient is not nervous/anxious and does not have insomnia.              - NOTE: All other systems reviewed and are negative, except as in HPI.      Exam:    /84   Pulse 83   Temp 36.8 °C (98.2 °F) (Temporal)   Resp 12   Ht 1.575 m (5' 2\")   Wt (!) 135 kg (298 lb)   SpO2 97%  Body mass index is 54.5 kg/m².    Physical Exam  Constitutional:       General: She is not in acute distress.     Appearance: Normal appearance. She is obese. She is not ill-appearing.   HENT:      Head: Normocephalic and atraumatic.   Pulmonary:      Effort: Pulmonary effort is normal.   Musculoskeletal:         General: Normal range of motion.   Neurological:      Mental Status: She is alert and oriented to person, place, and time.   Psychiatric:         Mood and Affect: Mood normal.         Behavior: Behavior normal.         Thought Content: Thought content normal.         Judgment: Judgment normal. "           Assessment/Plan:    1. Need for vaccination  - Pneumococcal Conjugate Vaccine 20-Valent (19 yrs+)  - Hepatitis B Vaccine Adult 20+    2. Mild episode of recurrent major depressive disorder (HCC)  Chronic and stable condition   At this time we will have patient titrate her sertraline form 50mg to 25 mg for the next 3 weeks after that she can stop the sertraline. At that time we will have her follow up to start titration of wellbutrin. She will need new wellbutrin dose as she is taking 300mg XL and unable to split tab.   Understands risks to titration and if she has any worsening symptoms to follow up immediately        Discussed with patient possible alternative diagnoses, patient is to take all medications as prescribed.     If symptoms persist FU w/PCP, if symptoms worsen go to emergency room.     If experiencing any side effects from prescribed medications reports to the office immediately or go to emergency room.    Reviewed indication, dosage, usage and potential adverse effects of prescribed medications.     Reviewed risks and benefits of treatment plan. Patient verbalizes understanding of all instruction and verbally agrees to plan.      Return in about 3 weeks (around 11/3/2023) for follow up on titration of meds.      Please note that this dictation was created using voice recognition software. I have made every reasonable attempt to correct obvious errors, but I expect that there are errors of grammar and possibly content that I did not discover before finalizing the note.

## 2024-02-22 ENCOUNTER — OFFICE VISIT (OUTPATIENT)
Dept: MEDICAL GROUP | Facility: PHYSICIAN GROUP | Age: 39
End: 2024-02-22
Payer: COMMERCIAL

## 2024-02-22 VITALS
SYSTOLIC BLOOD PRESSURE: 152 MMHG | TEMPERATURE: 97.7 F | DIASTOLIC BLOOD PRESSURE: 100 MMHG | HEIGHT: 62 IN | HEART RATE: 73 BPM | OXYGEN SATURATION: 98 % | BODY MASS INDEX: 53.92 KG/M2 | WEIGHT: 293 LBS | RESPIRATION RATE: 14 BRPM

## 2024-02-22 DIAGNOSIS — M06.00 RHEUMATOID ARTHRITIS WITH NEGATIVE RHEUMATOID FACTOR, INVOLVING UNSPECIFIED SITE (HCC): ICD-10-CM

## 2024-02-22 DIAGNOSIS — Z23 NEED FOR VACCINATION: ICD-10-CM

## 2024-02-22 DIAGNOSIS — Z00.00 PHYSICAL EXAM, ANNUAL: ICD-10-CM

## 2024-02-22 DIAGNOSIS — Z11.59 ENCOUNTER FOR HEPATITIS C SCREENING TEST FOR LOW RISK PATIENT: ICD-10-CM

## 2024-02-22 DIAGNOSIS — I10 PRIMARY HYPERTENSION: ICD-10-CM

## 2024-02-22 PROCEDURE — 3080F DIAST BP >= 90 MM HG: CPT | Performed by: PHYSICIAN ASSISTANT

## 2024-02-22 PROCEDURE — 90746 HEPB VACCINE 3 DOSE ADULT IM: CPT | Performed by: PHYSICIAN ASSISTANT

## 2024-02-22 PROCEDURE — 99214 OFFICE O/P EST MOD 30 MIN: CPT | Mod: 25 | Performed by: PHYSICIAN ASSISTANT

## 2024-02-22 PROCEDURE — 3077F SYST BP >= 140 MM HG: CPT | Performed by: PHYSICIAN ASSISTANT

## 2024-02-22 PROCEDURE — 90471 IMMUNIZATION ADMIN: CPT | Performed by: PHYSICIAN ASSISTANT

## 2024-02-22 RX ORDER — LISINOPRIL 10 MG/1
10 TABLET ORAL DAILY
Qty: 90 TABLET | Refills: 0 | Status: SHIPPED
Start: 2024-02-22

## 2024-02-22 ASSESSMENT — PATIENT HEALTH QUESTIONNAIRE - PHQ9
9. THOUGHTS THAT YOU WOULD BE BETTER OFF DEAD, OR OF HURTING YOURSELF: NOT AT ALL
1. LITTLE INTEREST OR PLEASURE IN DOING THINGS: NOT AT ALL
5. POOR APPETITE OR OVEREATING: NOT AT ALL
7. TROUBLE CONCENTRATING ON THINGS, SUCH AS READING THE NEWSPAPER OR WATCHING TELEVISION: NOT AT ALL
8. MOVING OR SPEAKING SO SLOWLY THAT OTHER PEOPLE COULD HAVE NOTICED. OR THE OPPOSITE, BEING SO FIGETY OR RESTLESS THAT YOU HAVE BEEN MOVING AROUND A LOT MORE THAN USUAL: NOT AT ALL
SUM OF ALL RESPONSES TO PHQ9 QUESTIONS 1 AND 2: 0
SUM OF ALL RESPONSES TO PHQ QUESTIONS 1-9: 0
6. FEELING BAD ABOUT YOURSELF - OR THAT YOU ARE A FAILURE OR HAVE LET YOURSELF OR YOUR FAMILY DOWN: NOT AL ALL
2. FEELING DOWN, DEPRESSED, IRRITABLE, OR HOPELESS: NOT AT ALL
4. FEELING TIRED OR HAVING LITTLE ENERGY: NOT AT ALL
3. TROUBLE FALLING OR STAYING ASLEEP OR SLEEPING TOO MUCH: NOT AT ALL

## 2024-02-22 ASSESSMENT — FIBROSIS 4 INDEX: FIB4 SCORE: 0.08
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
## 2024-03-21 ENCOUNTER — OFFICE VISIT (OUTPATIENT)
Dept: MEDICAL GROUP | Facility: PHYSICIAN GROUP | Age: 39
End: 2024-03-21
Payer: COMMERCIAL

## 2024-03-21 VITALS
WEIGHT: 293 LBS | HEART RATE: 97 BPM | BODY MASS INDEX: 53.92 KG/M2 | RESPIRATION RATE: 12 BRPM | SYSTOLIC BLOOD PRESSURE: 134 MMHG | TEMPERATURE: 97.9 F | HEIGHT: 62 IN | OXYGEN SATURATION: 98 % | DIASTOLIC BLOOD PRESSURE: 82 MMHG

## 2024-03-21 DIAGNOSIS — R05.1 ACUTE COUGH: ICD-10-CM

## 2024-03-21 PROCEDURE — 0241U POCT CEPHEID COV-2, FLU A/B, RSV - PCR: CPT | Performed by: PHYSICIAN ASSISTANT

## 2024-03-21 PROCEDURE — 99213 OFFICE O/P EST LOW 20 MIN: CPT | Performed by: PHYSICIAN ASSISTANT

## 2024-03-21 PROCEDURE — 3075F SYST BP GE 130 - 139MM HG: CPT | Performed by: PHYSICIAN ASSISTANT

## 2024-03-21 PROCEDURE — 3079F DIAST BP 80-89 MM HG: CPT | Performed by: PHYSICIAN ASSISTANT

## 2024-03-21 RX ORDER — AZITHROMYCIN 250 MG/1
TABLET, FILM COATED ORAL
Qty: 6 TABLET | Refills: 0 | Status: SHIPPED | OUTPATIENT
Start: 2024-03-21

## 2024-03-21 ASSESSMENT — FIBROSIS 4 INDEX: FIB4 SCORE: 0.08

## 2024-03-21 NOTE — PROGRESS NOTES
"CC:  Chief Complaint   Patient presents with    Sinus Problem     Cough, soar throat, chills, body aches, x2wks ago        HISTORY OF PRESENT ILLNESS: Patient is a 38 y.o. female established patient presenting with issues below  Pt having cough and congestion for past two weeks. Was clearing up and then two days ago acutely worsened with green sputum. No fevers, chills.     Current Outpatient Medications   Medication Sig Dispense Refill    Acetaminophen 500 MG Pack Take  by mouth.      lisinopril (PRINIVIL) 10 MG Tab Take 1 Tablet by mouth every day. 90 Tablet 0    hydroxychloroquine (PLAQUENIL) 200 MG Tab Take 2 Tablets by mouth every day. 180 Tablet 1    diclofenac DR (VOLTAREN) 75 MG Tablet Delayed Response Take 1 Tablet by mouth 2 times a day as needed (moderate prn). 180 Tablet 1    Multiple Vitamin (MULTI-VITAMIN DAILY PO) Multi Vitamin      albuterol 108 (90 Base) MCG/ACT Aero Soln inhalation aerosol Inhale 2 Puffs every 6 hours as needed for Shortness of Breath. 8.5 g 3     No current facility-administered medications for this visit.        ROS:     ROS    Exam:    /82   Pulse 97   Temp 36.6 °C (97.9 °F) (Temporal)   Resp 12   Ht 1.575 m (5' 2\")   Wt (!) 134 kg (295 lb)   SpO2 98%  Body mass index is 53.96 kg/m².    General:  Well nourished, well developed female in NAD  Head is grossly normal.  Neck: Supple.   Pulmonary: Clear to auscultation. No ronchi, wheezing or rales  Cardiac: Regular rate and rhythm. No murmurs.  Skin: Warm and dry. No obvious lesions  Neuro: Normal muscle tone. Gait normal. Alert and oriented.  Psych: Normal mood and affect      Please note that this dictation was created using voice recognition software. I have made every reasonable attempt to correct obvious errors, but I expect that there are errors of grammar and possibly content that I did not discover before finalizing the note.        Assessment/Plan:  1. Acute cough  Negative for POCT tests.  Start on " azithromycin.  Notify me if not improving  - POCT Cepheid CoV-2, Flu A/B, RSV - PCR  - azithromycin (ZITHROMAX) 250 MG Tab; Take as directed  Dispense: 6 Tablet; Refill: 0

## 2024-03-25 ENCOUNTER — APPOINTMENT (OUTPATIENT)
Dept: MEDICAL GROUP | Facility: PHYSICIAN GROUP | Age: 39
End: 2024-03-25
Payer: COMMERCIAL

## 2024-03-31 ENCOUNTER — OFFICE VISIT (OUTPATIENT)
Dept: URGENT CARE | Facility: CLINIC | Age: 39
End: 2024-03-31
Payer: COMMERCIAL

## 2024-03-31 VITALS
SYSTOLIC BLOOD PRESSURE: 132 MMHG | TEMPERATURE: 98.8 F | RESPIRATION RATE: 14 BRPM | DIASTOLIC BLOOD PRESSURE: 72 MMHG | WEIGHT: 293 LBS | OXYGEN SATURATION: 96 % | HEIGHT: 62 IN | HEART RATE: 100 BPM | BODY MASS INDEX: 53.92 KG/M2

## 2024-03-31 DIAGNOSIS — J20.9 ACUTE BRONCHITIS, UNSPECIFIED ORGANISM: ICD-10-CM

## 2024-03-31 DIAGNOSIS — H66.92 ACUTE LEFT OTITIS MEDIA: ICD-10-CM

## 2024-03-31 RX ORDER — PREDNISONE 20 MG/1
20 TABLET ORAL DAILY
Qty: 5 TABLET | Refills: 0 | Status: SHIPPED | OUTPATIENT
Start: 2024-03-31 | End: 2024-04-05

## 2024-03-31 RX ORDER — AMOXICILLIN AND CLAVULANATE POTASSIUM 875; 125 MG/1; MG/1
1 TABLET, FILM COATED ORAL 2 TIMES DAILY
Qty: 14 TABLET | Refills: 0 | Status: SHIPPED | OUTPATIENT
Start: 2024-03-31 | End: 2024-04-07

## 2024-03-31 RX ORDER — DEXTROMETHORPHAN HYDROBROMIDE AND PROMETHAZINE HYDROCHLORIDE 15; 6.25 MG/5ML; MG/5ML
5 SYRUP ORAL EVERY 4 HOURS PRN
Qty: 120 ML | Refills: 0 | Status: SHIPPED | OUTPATIENT
Start: 2024-03-31

## 2024-03-31 ASSESSMENT — FIBROSIS 4 INDEX: FIB4 SCORE: 0.08

## 2024-03-31 NOTE — PROGRESS NOTES
Subjective:   Katherine Carlos is a 38 y.o. female who presents for Cough (Patient coming for heavy coughing, shortness of breathe, fever,  body aches, sore throat x 1 month) and Otalgia (L side pain x 1 month )      HPI:  38-year-old female presents to the urgent care for approximately 1 month of a dry cough.  It started out with typical cold symptoms.  Was treated approximately 3 weeks ago for sinus infection and feels like her sinuses have significantly improved.  Still has some mild congestion but no sinus pain.  Over the past few days has been experiencing new onset left ear pain that is sharp.  No current fever, vomiting, diarrhea, shortness of breath, wheezing, chest pain, palpitations, or dizziness.      Medications:    Acetaminophen Pack  albuterol Aers  amoxicillin-clavulanate Tabs  azithromycin Tabs  diclofenac DR Tbec  hydroxychloroquine Tabs  lisinopril Tabs  MULTI-VITAMIN DAILY PO  predniSONE Tabs  promethazine-dextromethorphan    Allergies: Chocolate, Citrus, Latex, Peanut oil, Tomato, Citrus calcium +d [calcium carbonate-vitamin d], Lac bovis, Milk protein extract, Acetaminophen, and Morphine    Problem List: Katherine Carlos does not have any pertinent problems on file.    Surgical History:  Past Surgical History:   Procedure Laterality Date    HYSTERECTOMY LAPAROSCOPY  07/09/2021    still has ovaries    CHOLECYSTECTOMY  10/2020    REPEAT C SECTION W TUBAL LIGATION  09/09/2010    Performed by JF DOHERTY at [facility]    PRIMARY C SECTION  2008    OPEN REDUCTION Right 2006    right arm    TONSILLECTOMY  at age 5       Past Social Hx: Katherine Carlos  reports that she has quit smoking. Her smoking use included cigarettes. She has never used smokeless tobacco. She reports that she does not drink alcohol and does not use drugs.     Past Family Hx:  Katherine Carlos family history includes Alcohol abuse in her paternal aunt; Cancer in her sister; Diabetes in her  "brother, father, maternal grandfather, and paternal grandmother; Heart Disease in her father, maternal uncle, mother, and paternal grandfather; Lupus in her mother; Parkinson's Disease in her paternal grandmother.     Problem list, medications, and allergies reviewed by myself today in Epic.     Objective:     /72   Pulse 100   Temp 37.1 °C (98.8 °F) (Temporal)   Resp 14   Ht 1.575 m (5' 2\")   Wt (!) 134 kg (296 lb 1.6 oz)   LMP 04/09/2012   SpO2 96%   BMI 54.16 kg/m²     Physical Exam  Vitals reviewed.   HENT:      Head: Normocephalic.      Right Ear: Tympanic membrane, ear canal and external ear normal.      Left Ear: Ear canal and external ear normal. Tympanic membrane is injected and erythematous. Tympanic membrane is not perforated or bulging.      Nose: Congestion present.   Cardiovascular:      Rate and Rhythm: Normal rate and regular rhythm.      Pulses: Normal pulses.      Heart sounds: Normal heart sounds. No murmur heard.  Pulmonary:      Effort: Pulmonary effort is normal. No respiratory distress.      Breath sounds: Normal breath sounds. No stridor. No wheezing, rhonchi or rales.   Neurological:      Mental Status: She is alert.         Assessment/Plan:     Diagnosis and associated orders:     1. Acute left otitis media  amoxicillin-clavulanate (AUGMENTIN) 875-125 MG Tab      2. Acute bronchitis, unspecified organism  promethazine-dextromethorphan (PROMETHAZINE-DM) 6.25-15 MG/5ML syrup    predniSONE (DELTASONE) 20 MG Tab         Comments/MDM:     Presentation physical exam findings consistent with acute left otitis media.  Symptomology also consistent with suspected acute bronchitis.  Discussed typical timeframe for resolution of symptoms.  Promethazine-DM to better control her cough.  Course of prednisone to hopefully reduce inflammation and resolve her symptoms.  No wheezing, rales, rhonchi on exam.  I do not suspect any pneumonia at this time.  Vitals are stable.  No shortness of breath " or chest pain.  No red flag signs.  Bacterial sinus infection does appear to be treated at this time.  Return precautions discussed.         Differential diagnosis, natural history, supportive care, and indications for immediate follow-up discussed.    Advised the patient to follow-up with the primary care physician for recheck, reevaluation, and consideration of further management.    Please note that this dictation was created using voice recognition software. I have made a reasonable attempt to correct obvious errors, but I expect that there are errors of grammar and possibly content that I did not discover before finalizing the note.    Electronically signed by Armando Holder PA-C.

## 2024-04-19 ENCOUNTER — APPOINTMENT (OUTPATIENT)
Dept: MEDICAL GROUP | Facility: PHYSICIAN GROUP | Age: 39
End: 2024-04-19
Payer: COMMERCIAL

## 2024-04-19 VITALS
BODY MASS INDEX: 53.92 KG/M2 | WEIGHT: 293 LBS | HEIGHT: 62 IN | SYSTOLIC BLOOD PRESSURE: 124 MMHG | TEMPERATURE: 97.4 F | RESPIRATION RATE: 14 BRPM | DIASTOLIC BLOOD PRESSURE: 88 MMHG | HEART RATE: 84 BPM | OXYGEN SATURATION: 98 %

## 2024-04-19 DIAGNOSIS — F43.12 CHRONIC POST-TRAUMATIC STRESS DISORDER (PTSD): ICD-10-CM

## 2024-04-19 DIAGNOSIS — R53.83 OTHER FATIGUE: ICD-10-CM

## 2024-04-19 PROCEDURE — 3079F DIAST BP 80-89 MM HG: CPT | Performed by: PHYSICIAN ASSISTANT

## 2024-04-19 PROCEDURE — 3074F SYST BP LT 130 MM HG: CPT | Performed by: PHYSICIAN ASSISTANT

## 2024-04-19 PROCEDURE — 99214 OFFICE O/P EST MOD 30 MIN: CPT | Performed by: PHYSICIAN ASSISTANT

## 2024-04-19 ASSESSMENT — FIBROSIS 4 INDEX: FIB4 SCORE: 0.14

## 2024-04-19 NOTE — PROGRESS NOTES
"CC:  Chief Complaint   Patient presents with    Paperwork    Medication Refill     diclofenac DR       HISTORY OF PRESENT ILLNESS: Patient is a 38 y.o. female established patient presenting with issues below  Needing FMLA form filled out for her RA related severe fatigue.   Pt has been having nightmares in the past week. Has also been discussing past childhood trauma at therapy groups. Has hx of PTSD.     Current Outpatient Medications   Medication Sig Dispense Refill    hydroxychloroquine (PLAQUENIL) 200 MG Tab Take 2 Tablets by mouth every day. 180 Tablet 1    Acetaminophen 500 MG Pack Take  by mouth.      lisinopril (PRINIVIL) 10 MG Tab Take 1 Tablet by mouth every day. 90 Tablet 0    diclofenac DR (VOLTAREN) 75 MG Tablet Delayed Response Take 1 Tablet by mouth 2 times a day as needed (moderate prn). 180 Tablet 1    Multiple Vitamin (MULTI-VITAMIN DAILY PO) Multi Vitamin      albuterol 108 (90 Base) MCG/ACT Aero Soln inhalation aerosol Inhale 2 Puffs every 6 hours as needed for Shortness of Breath. 8.5 g 3    promethazine-dextromethorphan (PROMETHAZINE-DM) 6.25-15 MG/5ML syrup Take 5 mL by mouth every four hours as needed for Cough. (Patient not taking: Reported on 4/19/2024) 120 mL 0    azithromycin (ZITHROMAX) 250 MG Tab Take as directed (Patient not taking: Reported on 4/19/2024) 6 Tablet 0     No current facility-administered medications for this visit.        ROS:     ROS    Exam:    /88   Pulse 84   Temp 36.3 °C (97.4 °F) (Temporal)   Resp 14   Ht 1.575 m (5' 2\")   Wt (!) 135 kg (298 lb)   SpO2 98%  Body mass index is 54.5 kg/m².    General:  Well nourished, well developed female in NAD  Head is grossly normal.  Neck: Supple.   Skin: Warm and dry. No obvious lesions  Neuro: Normal muscle tone. Gait normal. Alert and oriented.  Psych: Normal mood and affect      Please note that this dictation was created using voice recognition software. I have made every reasonable attempt to correct obvious " errors, but I expect that there are errors of grammar and possibly content that I did not discover before finalizing the note.        Assessment/Plan:  1. Other fatigue  FMLA forms filled out    2. Chronic post-traumatic stress disorder (PTSD)  Watch and wait. Notify me if happening chronically

## 2024-04-30 ENCOUNTER — APPOINTMENT (OUTPATIENT)
Dept: RADIOLOGY | Facility: MEDICAL CENTER | Age: 39
End: 2024-04-30
Attending: NURSE PRACTITIONER
Payer: COMMERCIAL

## 2024-04-30 DIAGNOSIS — R76.8 POSITIVE ANA (ANTINUCLEAR ANTIBODY): ICD-10-CM

## 2024-04-30 PROCEDURE — 72195 MRI PELVIS W/O DYE: CPT

## 2024-05-14 DIAGNOSIS — I10 PRIMARY HYPERTENSION: ICD-10-CM

## 2024-05-14 RX ORDER — LISINOPRIL 10 MG/1
10 TABLET ORAL DAILY
Qty: 90 TABLET | Refills: 0 | Status: SHIPPED | OUTPATIENT
Start: 2024-05-14

## 2024-05-14 NOTE — TELEPHONE ENCOUNTER
Received request via: Pharmacy    Was the patient seen in the last year in this department? Yes    Does the patient have an active prescription (recently filled or refills available) for medication(s) requested? No    Pharmacy Name: Pharmacy:   [facility]/Pharmacy #[id number] - [address]     Does the patient have correction Plus and need 100 day supply (blood pressure, diabetes and cholesterol meds only)? Patient does not have SCP

## 2024-07-10 ENCOUNTER — OFFICE VISIT (OUTPATIENT)
Dept: URGENT CARE | Facility: CLINIC | Age: 39
End: 2024-07-10
Payer: COMMERCIAL

## 2024-07-10 VITALS
BODY MASS INDEX: 53.92 KG/M2 | HEIGHT: 62 IN | TEMPERATURE: 97.8 F | WEIGHT: 293 LBS | HEART RATE: 91 BPM | OXYGEN SATURATION: 98 % | DIASTOLIC BLOOD PRESSURE: 82 MMHG | SYSTOLIC BLOOD PRESSURE: 124 MMHG | RESPIRATION RATE: 18 BRPM

## 2024-07-10 DIAGNOSIS — N30.01 ACUTE CYSTITIS WITH HEMATURIA: ICD-10-CM

## 2024-07-10 LAB
APPEARANCE UR: CLEAR
BILIRUB UR STRIP-MCNC: NEGATIVE MG/DL
COLOR UR AUTO: YELLOW
GLUCOSE UR STRIP.AUTO-MCNC: NEGATIVE MG/DL
KETONES UR STRIP.AUTO-MCNC: NORMAL MG/DL
LEUKOCYTE ESTERASE UR QL STRIP.AUTO: NORMAL
NITRITE UR QL STRIP.AUTO: NEGATIVE
PH UR STRIP.AUTO: 5.5 [PH] (ref 5–8)
POCT INT CON NEG: NEGATIVE
POCT INT CON POS: POSITIVE
POCT URINE PREGNANCY TEST: NEGATIVE
PROT UR QL STRIP: 30 MG/DL
RBC UR QL AUTO: NORMAL
SP GR UR STRIP.AUTO: 1.03
UROBILINOGEN UR STRIP-MCNC: 0.2 MG/DL

## 2024-07-10 PROCEDURE — 3079F DIAST BP 80-89 MM HG: CPT | Performed by: PHYSICIAN ASSISTANT

## 2024-07-10 PROCEDURE — 81002 URINALYSIS NONAUTO W/O SCOPE: CPT | Performed by: PHYSICIAN ASSISTANT

## 2024-07-10 PROCEDURE — 99213 OFFICE O/P EST LOW 20 MIN: CPT | Performed by: PHYSICIAN ASSISTANT

## 2024-07-10 PROCEDURE — 3074F SYST BP LT 130 MM HG: CPT | Performed by: PHYSICIAN ASSISTANT

## 2024-07-10 PROCEDURE — 81025 URINE PREGNANCY TEST: CPT | Performed by: PHYSICIAN ASSISTANT

## 2024-07-10 RX ORDER — NITROFURANTOIN 25; 75 MG/1; MG/1
100 CAPSULE ORAL EVERY 12 HOURS
Qty: 10 CAPSULE | Refills: 0 | Status: SHIPPED | OUTPATIENT
Start: 2024-07-10 | End: 2024-07-15

## 2024-07-10 ASSESSMENT — ENCOUNTER SYMPTOMS
FLANK PAIN: 0
NAUSEA: 0
VOMITING: 0
CHILLS: 0
FEVER: 0
ABDOMINAL PAIN: 1

## 2024-07-10 ASSESSMENT — FIBROSIS 4 INDEX: FIB4 SCORE: 0.14

## 2024-07-11 ENCOUNTER — APPOINTMENT (OUTPATIENT)
Dept: MEDICAL GROUP | Facility: PHYSICIAN GROUP | Age: 39
End: 2024-07-11
Payer: COMMERCIAL

## 2024-08-16 ENCOUNTER — APPOINTMENT (OUTPATIENT)
Dept: MEDICAL GROUP | Facility: LAB | Age: 39
End: 2024-08-16
Payer: COMMERCIAL

## 2024-09-28 DIAGNOSIS — I10 PRIMARY HYPERTENSION: ICD-10-CM

## 2024-09-30 RX ORDER — LISINOPRIL 10 MG/1
10 TABLET ORAL DAILY
Qty: 90 TABLET | Refills: 0 | Status: SHIPPED | OUTPATIENT
Start: 2024-09-30

## 2024-09-30 NOTE — TELEPHONE ENCOUNTER
Received request via: Patient    Was the patient seen in the last year in this department? Yes    Does the patient have an active prescription (recently filled or refills available) for medication(s) requested? No    [facility]    Does the patient have halfway Plus and need 100-day supply? (This applies to ALL medications) Patient does not have SCP

## 2024-12-11 ENCOUNTER — APPOINTMENT (OUTPATIENT)
Dept: MEDICAL GROUP | Facility: PHYSICIAN GROUP | Age: 39
End: 2024-12-11
Payer: COMMERCIAL

## 2024-12-28 DIAGNOSIS — I10 PRIMARY HYPERTENSION: ICD-10-CM

## 2024-12-30 RX ORDER — LISINOPRIL 10 MG/1
10 TABLET ORAL DAILY
Qty: 90 TABLET | Refills: 0 | Status: SHIPPED | OUTPATIENT
Start: 2024-12-30

## 2025-01-14 ENCOUNTER — OFFICE VISIT (OUTPATIENT)
Dept: MEDICAL GROUP | Facility: PHYSICIAN GROUP | Age: 40
End: 2025-01-14
Payer: COMMERCIAL

## 2025-01-14 VITALS
SYSTOLIC BLOOD PRESSURE: 174 MMHG | WEIGHT: 293 LBS | RESPIRATION RATE: 14 BRPM | DIASTOLIC BLOOD PRESSURE: 76 MMHG | HEART RATE: 93 BPM | TEMPERATURE: 96.7 F | BODY MASS INDEX: 53.92 KG/M2 | OXYGEN SATURATION: 97 % | HEIGHT: 62 IN

## 2025-01-14 DIAGNOSIS — U07.1 COVID-19: ICD-10-CM

## 2025-01-14 PROCEDURE — 3077F SYST BP >= 140 MM HG: CPT | Performed by: STUDENT IN AN ORGANIZED HEALTH CARE EDUCATION/TRAINING PROGRAM

## 2025-01-14 PROCEDURE — 3078F DIAST BP <80 MM HG: CPT | Performed by: STUDENT IN AN ORGANIZED HEALTH CARE EDUCATION/TRAINING PROGRAM

## 2025-01-14 PROCEDURE — 99214 OFFICE O/P EST MOD 30 MIN: CPT | Performed by: STUDENT IN AN ORGANIZED HEALTH CARE EDUCATION/TRAINING PROGRAM

## 2025-01-14 ASSESSMENT — FIBROSIS 4 INDEX: FIB4 SCORE: 0.33

## 2025-01-15 NOTE — PROGRESS NOTES
"Verbal Consent given for JENARO recording software    HISTORY OF PRESENT ILLNESS: Katherine is a pleasant 39 y.o. female, established patient who presents today to discuss medical problems as listed below:    History of Present Illness  The patient presents for evaluation of COVID-19, accompanied by her .    She has had mild congestion and a tickling throat for 2 days, worsening this morning. A home COVID-19 test was positive. She has no prior Paxlovid use and is concerned about interactions with methotrexate, sulfasalazine, and Plaquenil. She feels unwell and seeks symptom relief. She does not smoke.    She takes lisinopril for hypertension in the evening, with home BP readings of 125-128.    SOCIAL HISTORY  - Does not smoke    MEDICATIONS  methotrexate, sulfasalazine, Plaquenil, lisinopril       Current Outpatient Medications Ordered in Epic   Medication Sig Dispense Refill    Nirmatrelvir&Ritonavir 300/100 20 x 150 MG & 10 x 100MG Tablet Therapy Pack Take 300 mg nirmatrelvir (two 150 mg tablets) with 100 mg ritonavir (one 100 mg tablet) by mouth, with all three tablets taken together twice daily for 5 days. 30 Each 0    Insulin Syringe-Needle U-100 (B-D INSULIN SYRINGE 1CC/27G) 27G X 1/2\" 1 ML Misc 1 mL every 7 days. 100 Each 0    sulfaSALAzine (AZULFIDINE EN-TAB) 500 MG EC tablet 500mg (1 tab) twice daily for 1 week then 1000mg (2 tabs) twice daily 120 Tablet 1    Methotrexate Sodium 50 MG/2ML Solution Inject 1mL under the skin weekly 6 mL 1    hydroxychloroquine (PLAQUENIL) 200 MG Tab Take 2 Tablets by mouth every day. 180 Tablet 1    diclofenac DR (VOLTAREN) 75 MG Tablet Delayed Response Take 1 Tablet by mouth 2 times a day as needed (moderate prn). 180 Tablet 1    lisinopril (PRINIVIL) 10 MG Tab TAKE 1 TABLET BY MOUTH EVERY DAY 90 Tablet 0    Multiple Vitamin (THERAGRAN PO) Take 1 Tablet by mouth every day.      albuterol 108 (90 Base) MCG/ACT Aero Soln inhalation aerosol 2 puff(s) inhaled every 6 hours   "    [facility] COVID-19 AT HOME TEST KIT Kit FOLLOW INSTRUCTIONS INCLUDED WITH THE PACKAGE.      folic acid (FOLVITE) 1 MG Tab Take 2 Tablets by mouth every day. 180 Tablet 3    Acetaminophen 500 MG Pack Take  by mouth.      Multiple Vitamin (MULTI-VITAMIN DAILY PO) Multi Vitamin      albuterol 108 (90 Base) MCG/ACT Aero Soln inhalation aerosol Inhale 2 Puffs every 6 hours as needed for Shortness of Breath. 8.5 g 3     No current [facility]-ordered facility-administered medications on file.       Review of systems:  Per HPI    Patient Active Problem List    Diagnosis Date Noted    Rheumatoid arthritis with negative rheumatoid factor (HCC) 02/22/2024    Chronic pain of both knees 08/01/2023    Joint pain 05/10/2023    Obstructive sleep apnea syndrome 11/15/2022    Chronic cholecystitis 06/03/2022    Asthma 05/23/2022    Seasonal allergies 05/23/2022    Depressive disorder 05/22/2022    Right hip pain 03/29/2022    Right lower quadrant abdominal pain 03/22/2022    Hepatic steatosis 03/22/2022    Ear pain, left 02/18/2022    Chronic post-traumatic stress disorder (PTSD) 01/11/2022    Mild episode of recurrent major depressive disorder (HCC) 01/11/2022    Anxiety 10/01/2021    Acne vulgaris 10/01/2021    History of partial hysterectomy 08/25/2021    History of gestational diabetes 08/25/2021    Class 3 severe obesity due to excess calories without serious comorbidity with body mass index (BMI) of 50.0 to 59.9 in adult ([facility]) 08/25/2021     Past Surgical History:   Procedure Laterality Date    HYSTERECTOMY LAPAROSCOPY  07/09/2021    still has ovaries    CHOLECYSTECTOMY  10/2020    REPEAT C SECTION W TUBAL LIGATION  09/09/2010    Performed by JF DOHERTY at LABOR AND DELIVERY    PRIMARY C SECTION  2008    OPEN REDUCTION Right 2006    right arm    TONSILLECTOMY  at age 5     Social History     Tobacco Use    Smoking status: Former     Types: Cigarettes    Smokeless tobacco: Never   Vaping Use    Vaping status: Never Used   Substance  "Use Topics    Alcohol use: Never    Drug use: Never     Comment:  CBD       Family History   Problem Relation Age of Onset    Lupus Mother     Heart Disease Mother     Diabetes Father     Heart Disease Father     Diabetes Brother     Heart Disease Maternal Uncle         MI    Diabetes Maternal Grandfather     Parkinson's Disease Paternal Grandmother     Diabetes Paternal Grandmother     Cancer Sister         cervical cancer    Alcohol abuse Paternal Aunt     Heart Disease Paternal Grandfather      Current Outpatient Medications   Medication Sig Dispense Refill    Nirmatrelvir&Ritonavir 300/100 20 x 150 MG & 10 x 100MG Tablet Therapy Pack Take 300 mg nirmatrelvir (two 150 mg tablets) with 100 mg ritonavir (one 100 mg tablet) by mouth, with all three tablets taken together twice daily for 5 days. 30 Each 0    Insulin Syringe-Needle U-100 (B-D INSULIN SYRINGE 1CC/27G) 27G X 1/2\" 1 ML Misc 1 mL every 7 days. 100 Each 0    sulfaSALAzine (AZULFIDINE EN-TAB) 500 MG EC tablet 500mg (1 tab) twice daily for 1 week then 1000mg (2 tabs) twice daily 120 Tablet 1    Methotrexate Sodium 50 MG/2ML Solution Inject 1mL under the skin weekly 6 mL 1    hydroxychloroquine (PLAQUENIL) 200 MG Tab Take 2 Tablets by mouth every day. 180 Tablet 1    diclofenac DR (VOLTAREN) 75 MG Tablet Delayed Response Take 1 Tablet by mouth 2 times a day as needed (moderate prn). 180 Tablet 1    lisinopril (PRINIVIL) 10 MG Tab TAKE 1 TABLET BY MOUTH EVERY DAY 90 Tablet 0    Multiple Vitamin (THERAGRAN PO) Take 1 Tablet by mouth every day.      albuterol 108 (90 Base) MCG/ACT Aero Soln inhalation aerosol 2 puff(s) inhaled every 6 hours      [facility] COVID-19 AT HOME TEST KIT Kit FOLLOW INSTRUCTIONS INCLUDED WITH THE PACKAGE.      folic acid (FOLVITE) 1 MG Tab Take 2 Tablets by mouth every day. 180 Tablet 3    Acetaminophen 500 MG Pack Take  by mouth.      Multiple Vitamin (MULTI-VITAMIN DAILY PO) Multi Vitamin      albuterol 108 (90 Base) MCG/ACT Aero Soln " "inhalation aerosol Inhale 2 Puffs every 6 hours as needed for Shortness of Breath. 8.5 g 3     No current facility-administered medications for this visit.       Allergies:  Allergies   Allergen Reactions    Chocolate Diarrhea, Hives and Itching     tongue itching    Other Reaction(s): Unknown    Citrus Hives and Nausea     tongue itching    Latex Hives and Unspecified     Skin burns.      Peanut Oil Hives and Itching     tongue itching    Other Reaction(s): Unknown    Tomato Itching     Itchy tongue    Other Reaction(s): Unknown    Calcium Carbonate-Vitamin D Unspecified     Other Reaction(s): Unknown    Lac Bovis Unspecified     Other Reaction(s): Unknown    Milk Protein Extract      Pt reports that she gets gassy     Morphine Vomiting     Other Reaction(s): Unknown       Allergies, past medical history, past surgical history, family history, social history reviewed and updated.    Objective:    BP (!) 174/76   Pulse 93   Temp 35.9 °C (96.7 °F) (Temporal)   Resp 14   Ht 1.575 m (5' 2\")   Wt (!) 136 kg (299 lb 13.2 oz)   LMP 04/09/2012   SpO2 97%   BMI 54.84 kg/m²    Body mass index is 54.84 kg/m².    Physical exam:  General: Normal appearance, no acute distress, not ill-appearing  HEENT: EOM intact, conjunctiva normal limits, negative right/left eye discharge.  Sclera anicteric  Cardiovascular: Normal rate and rhythm, no murmurs  Pulmonary: No respiratory distress, no wheezing, no rales, breath sounds normal.  Musculoskeletal: No edema bilaterally  Skin: Warm, dry, no lesions  Neurological: No focal deficits  Psychiatric: Mood within normal limits    Assessment/Plan:    Assessment & Plan  1. COVID-19 - Positive home test with symptoms of congestion and sore throat body aches  - Prescribe Paxlovid after confirming no interactions with methotrexate, sulfasalazine, and Plaquenil  - Advise hydration and Mucinex as needed  - Patient consents to medication regimen  - gfr wnl     2. Elevated blood pressure - BP " noted high during visit  - Takes lisinopril at night with home readings of 125-128 mmHg  - Advise taking lisinopril dose at home and an additional 10 mg if BP remains elevated       Problem List Items Addressed This Visit    None  Visit Diagnoses       COVID-19        Relevant Medications    Nirmatrelvir&Ritonavir 300/100 20 x 150 MG & 10 x 100MG Tablet Therapy Pack            Return if symptoms worsen or fail to improve.

## 2025-01-26 ENCOUNTER — OFFICE VISIT (OUTPATIENT)
Dept: URGENT CARE | Facility: CLINIC | Age: 40
End: 2025-01-26
Payer: COMMERCIAL

## 2025-01-26 VITALS
OXYGEN SATURATION: 97 % | BODY MASS INDEX: 53.92 KG/M2 | RESPIRATION RATE: 18 BRPM | WEIGHT: 293 LBS | HEIGHT: 62 IN | SYSTOLIC BLOOD PRESSURE: 126 MMHG | TEMPERATURE: 97.9 F | HEART RATE: 88 BPM | DIASTOLIC BLOOD PRESSURE: 84 MMHG

## 2025-01-26 DIAGNOSIS — H66.002 NON-RECURRENT ACUTE SUPPURATIVE OTITIS MEDIA OF LEFT EAR WITHOUT SPONTANEOUS RUPTURE OF TYMPANIC MEMBRANE: ICD-10-CM

## 2025-01-26 DIAGNOSIS — J06.9 VIRAL URI WITH COUGH: ICD-10-CM

## 2025-01-26 PROCEDURE — 3079F DIAST BP 80-89 MM HG: CPT | Performed by: FAMILY MEDICINE

## 2025-01-26 PROCEDURE — 99213 OFFICE O/P EST LOW 20 MIN: CPT | Performed by: FAMILY MEDICINE

## 2025-01-26 PROCEDURE — 3074F SYST BP LT 130 MM HG: CPT | Performed by: FAMILY MEDICINE

## 2025-01-26 RX ORDER — ALBUTEROL SULFATE 90 UG/1
2 INHALANT RESPIRATORY (INHALATION) EVERY 6 HOURS PRN
Qty: 8.5 G | Refills: 0 | Status: SHIPPED | OUTPATIENT
Start: 2025-01-26

## 2025-01-26 ASSESSMENT — ENCOUNTER SYMPTOMS
FEVER: 0
COUGH: 1
WHEEZING: 1

## 2025-01-26 ASSESSMENT — FIBROSIS 4 INDEX: FIB4 SCORE: 0.33

## 2025-01-26 NOTE — PROGRESS NOTES
"Subjective:     Katherine Carlos is a 39 y.o. female who presents for Cough (X 2 weeks) and Nasal Congestion    HPI  Pt presents for evaluation of cough for about 2 weeks  Patient initially evaluated by PCP 2025 and was prescribed Paxlovid after positive COVID-19 testing  Pt with cough which is moderate   Was improving on Paxlovid, and worsened again after finishing meds   Still having sore throat   Cough is productive with green sputum   Having left ear pain   Using tylenol, Nyquil, and tessalon which haven't helped much     Review of Systems   Constitutional:  Negative for fever.   HENT:  Positive for congestion.    Respiratory:  Positive for cough and wheezing.        PMH:  has a past medical history of ASTHMA, FHx: cleft palate (2010), FHx: cleft palate (2010), GBS (group B Streptococcus carrier), +RV culture, currently pregnant (2010), History of  (2010), History of COVID-19 (2021), History of macrosomia in infant in prior pregnancy, currently pregnant (2010), History of macrosomia in infant in prior pregnancy, currently pregnant (2010), Hypoglycemia, Previous  section- desires Repeat (2010), SUPERVISION OF HIGH-RISK PREGNANCY (2010), and SUPERVISION OF HIGH-RISK PREGNANCY (2010).  MEDS:   Current Outpatient Medications:     amoxicillin-clavulanate (AUGMENTIN) 875-125 MG Tab, Take 1 Tablet by mouth 2 times a day for 7 days., Disp: 14 Tablet, Rfl: 0    albuterol 108 (90 Base) MCG/ACT Aero Soln inhalation aerosol, Inhale 2 Puffs every 6 hours as needed for Shortness of Breath., Disp: 8.5 g, Rfl: 0    Nirmatrelvir&Ritonavir 300/100 20 x 150 MG & 10 x 100MG Tablet Therapy Pack, Take 300 mg nirmatrelvir (two 150 mg tablets) with 100 mg ritonavir (one 100 mg tablet) by mouth, with all three tablets taken together twice daily for 5 days., Disp: 30 Each, Rfl: 0    Insulin Syringe-Needle U-100 (B-D INSULIN SYRINGE 1CC/27G) 27G X 1/2\" 1 ML " Misc, 1 mL every 7 days., Disp: 100 Each, Rfl: 0    sulfaSALAzine (AZULFIDINE EN-TAB) 500 MG EC tablet, 500mg (1 tab) twice daily for 1 week then 1000mg (2 tabs) twice daily, Disp: 120 Tablet, Rfl: 1    Methotrexate Sodium 50 MG/2ML Solution, Inject 1mL under the skin weekly, Disp: 6 mL, Rfl: 1    hydroxychloroquine (PLAQUENIL) 200 MG Tab, Take 2 Tablets by mouth every day., Disp: 180 Tablet, Rfl: 1    diclofenac DR (VOLTAREN) 75 MG Tablet Delayed Response, Take 1 Tablet by mouth 2 times a day as needed (moderate prn)., Disp: 180 Tablet, Rfl: 1    lisinopril (PRINIVIL) 10 MG Tab, TAKE 1 TABLET BY MOUTH EVERY DAY, Disp: 90 Tablet, Rfl: 0    Multiple Vitamin (THERAGRAN PO), Take 1 Tablet by mouth every day., Disp: , Rfl:     [facility] COVID-19 AT HOME TEST KIT Kit, FOLLOW INSTRUCTIONS INCLUDED WITH THE PACKAGE., Disp: , Rfl:     folic acid (FOLVITE) 1 MG Tab, Take 2 Tablets by mouth every day., Disp: 180 Tablet, Rfl: 3    Acetaminophen 500 MG Pack, Take  by mouth., Disp: , Rfl:     Multiple Vitamin (MULTI-VITAMIN DAILY PO), Multi Vitamin, Disp: , Rfl:   ALLERGIES:   Allergies   Allergen Reactions    Chocolate Diarrhea, Hives and Itching     tongue itching    Other Reaction(s): Unknown    Citrus Hives and Nausea     tongue itching    Latex Hives and Unspecified     Skin burns.      Peanut Oil Hives and Itching     tongue itching    Other Reaction(s): Unknown    Tomato Itching     Itchy tongue    Other Reaction(s): Unknown    Calcium Carbonate-Vitamin D Unspecified     Other Reaction(s): Unknown    Lac Bovis Unspecified     Other Reaction(s): Unknown    Milk Protein Extract      Pt reports that she gets gassy     Morphine Vomiting     Other Reaction(s): Unknown     SURGHX:   Past Surgical History:   Procedure Laterality Date    HYSTERECTOMY LAPAROSCOPY  07/09/2021    still has ovaries    CHOLECYSTECTOMY  10/2020    REPEAT C SECTION W TUBAL LIGATION  09/09/2010    Performed by JF DOHERTY at [facility]OR [facility] DELIVERY    PRIMARY C  "SECTION  2008    OPEN REDUCTION Right 2006    right arm    TONSILLECTOMY  at age 5     SOCHX:  reports that she has quit smoking. Her smoking use included cigarettes. She has never used smokeless tobacco. She reports that she does not drink alcohol and does not use drugs.     Objective:   /84   Pulse 88   Temp 36.6 °C (97.9 °F) (Temporal)   Resp 18   Ht 1.575 m (5' 2\")   Wt (!) 136 kg (299 lb)   LMP 04/09/2012   SpO2 97%   BMI 54.69 kg/m²     Physical Exam  Constitutional:       General: She is not in acute distress.     Appearance: She is well-developed. She is not diaphoretic.   HENT:      Head: Normocephalic and atraumatic.      Right Ear: Tympanic membrane, ear canal and external ear normal.      Left Ear: Tympanic membrane, ear canal and external ear normal.      Nose: Nose normal.      Mouth/Throat:      Mouth: Mucous membranes are moist.      Pharynx: Oropharynx is clear. No oropharyngeal exudate or posterior oropharyngeal erythema.   Neck:      Trachea: No tracheal deviation.   Cardiovascular:      Rate and Rhythm: Normal rate and regular rhythm.   Pulmonary:      Comments: Breathing comfortably on room air, good air movement bilaterally, end expiratory wheezes throughout, no focal rales  Musculoskeletal:      Cervical back: Normal range of motion and neck supple. No tenderness.   Lymphadenopathy:      Cervical: No cervical adenopathy.   Skin:     General: Skin is warm and dry.      Findings: No rash.   Neurological:      Mental Status: She is alert.       Assessment/Plan:   Assessment    1. Non-recurrent acute suppurative otitis media of left ear without spontaneous rupture of tympanic membrane  - amoxicillin-clavulanate (AUGMENTIN) 875-125 MG Tab; Take 1 Tablet by mouth 2 times a day for 7 days.  Dispense: 14 Tablet; Refill: 0    2. Viral URI with cough  - albuterol 108 (90 Base) MCG/ACT Aero Soln inhalation aerosol; Inhale 2 Puffs every 6 hours as needed for Shortness of Breath.  Dispense: " 8.5 g; Refill: 0    Patient with viral URI and left otitis media.  Previously tested positive for COVID-19, and unclear if this is still from COVID or if she had another viral URI on top of COVID-19.  Has no sign of secondary pneumonia at this time.  Recommended treating with Augmentin for otitis media and given albuterol inhaler to help cough.  All questions answered and will follow-up in the urgent care as needed.

## 2025-01-26 NOTE — LETTER
January 26, 2025    To Whom It May Concern:         This is confirmation that Katherine Carlos attended her scheduled appointment with Abraham Caruso M.D. on 1/26/25.  Please excuse her from work 1/23 - 1/24.  Thank you for making accommodations as she recovers.           If you have any questions please do not hesitate to call me at the phone number listed below.    Sincerely,          Abraham Caruso M.D.  [phone]

## 2025-02-03 ENCOUNTER — APPOINTMENT (OUTPATIENT)
Dept: MEDICAL GROUP | Facility: PHYSICIAN GROUP | Age: 40
End: 2025-02-03
Payer: COMMERCIAL

## 2025-02-12 ENCOUNTER — OFFICE VISIT (OUTPATIENT)
Dept: MEDICAL GROUP | Facility: PHYSICIAN GROUP | Age: 40
End: 2025-02-12
Payer: COMMERCIAL

## 2025-02-12 VITALS
RESPIRATION RATE: 14 BRPM | TEMPERATURE: 96.7 F | WEIGHT: 293 LBS | OXYGEN SATURATION: 97 % | DIASTOLIC BLOOD PRESSURE: 80 MMHG | BODY MASS INDEX: 53.92 KG/M2 | HEIGHT: 62 IN | SYSTOLIC BLOOD PRESSURE: 120 MMHG | HEART RATE: 102 BPM

## 2025-02-12 DIAGNOSIS — M06.00 RHEUMATOID ARTHRITIS WITH NEGATIVE RHEUMATOID FACTOR, INVOLVING UNSPECIFIED SITE (HCC): ICD-10-CM

## 2025-02-12 DIAGNOSIS — Z00.00 PHYSICAL EXAM, ANNUAL: ICD-10-CM

## 2025-02-12 PROCEDURE — 99213 OFFICE O/P EST LOW 20 MIN: CPT | Performed by: PHYSICIAN ASSISTANT

## 2025-02-12 ASSESSMENT — FIBROSIS 4 INDEX: FIB4 SCORE: 0.33

## 2025-02-12 ASSESSMENT — PATIENT HEALTH QUESTIONNAIRE - PHQ9: CLINICAL INTERPRETATION OF PHQ2 SCORE: 0

## 2025-02-12 NOTE — PROGRESS NOTES
"CC:  Chief Complaint   Patient presents with    Paperwork       HISTORY OF PRESENT ILLNESS: Patient is a 39 y.o. 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  Patient would like her forms filled out allowing her to continue to work from home    Current Outpatient Medications   Medication Sig Dispense Refill    albuterol 108 (90 Base) MCG/ACT Aero Soln inhalation aerosol Inhale 2 Puffs every 6 hours as needed for Shortness of Breath. 8.5 g 0    Nirmatrelvir&Ritonavir 300/100 20 x 150 MG & 10 x 100MG Tablet Therapy Pack Take 300 mg nirmatrelvir (two 150 mg tablets) with 100 mg ritonavir (one 100 mg tablet) by mouth, with all three tablets taken together twice daily for 5 days. 30 Each 0    Insulin Syringe-Needle U-100 (B-D INSULIN SYRINGE 1CC/27G) 27G X 1/2\" 1 ML Misc 1 mL every 7 days. 100 Each 0    Methotrexate Sodium 50 MG/2ML Solution Inject 1mL under the skin weekly 6 mL 1    hydroxychloroquine (PLAQUENIL) 200 MG Tab Take 2 Tablets by mouth every day. 180 Tablet 1    diclofenac DR (VOLTAREN) 75 MG Tablet Delayed Response Take 1 Tablet by mouth 2 times a day as needed (moderate prn). 180 Tablet 1    lisinopril (PRINIVIL) 10 MG Tab TAKE 1 TABLET BY MOUTH EVERY DAY 90 Tablet 0    Multiple Vitamin (THERAGRAN PO) Take 1 Tablet by mouth every day.      folic acid (FOLVITE) 1 MG Tab Take 2 Tablets by mouth every day. 180 Tablet 3    Multiple Vitamin (MULTI-VITAMIN DAILY PO) Multi Vitamin      sulfaSALAzine (AZULFIDINE EN-TAB) 500 MG EC tablet 500mg (1 tab) twice daily for 1 week then 1000mg (2 tabs) twice daily (Patient not taking: Reported on 2/12/2025) 120 Tablet 1    [facility] COVID-19 AT HOME TEST KIT Kit FOLLOW INSTRUCTIONS INCLUDED WITH THE PACKAGE. (Patient not taking: Reported on 2/12/2025)      Acetaminophen 500 MG Pack Take  by mouth. (Patient not taking: Reported on 2/12/2025)       No current " "facility-administered medications for this visit.        ROS:     ROS    Exam:    /80   Pulse (!) 102   Temp 35.9 °C (96.7 °F) (Temporal)   Resp 14   Ht 1.575 m (5' 2\")   Wt (!) 137 kg (302 lb)   SpO2 97%  Body mass index is 55.24 kg/m².    General:  Well nourished, well developed female in NAD  Head is grossly normal.  Neck: Supple.   Skin: Warm and dry. No obvious lesions  Neuro: Normal muscle tone. Gait normal. Alert and oriented.  Psych: Normal mood and affect      Please note that this dictation was created using voice recognition software. I have made every reasonable attempt to correct obvious errors, but I expect that there are errors of grammar and possibly content that I did not discover before finalizing the note.        Assessment/Plan:    1. Physical exam, annual  Labs printed  - HEMOGLOBIN A1C; Future  - Lipid Profile; Future    2. Rheumatoid arthritis with negative rheumatoid factor, involving unspecified site (HCC)  Forms filled out           "

## 2025-02-18 NOTE — ED NOTES
31+0    Routine Prenatal Care  Di/Di Twins  -Increased fatigue and very tired all day. Tossing and turning a lot at night. Does have a pregnancy pillow.   Increased pelvic pressure and cramping. Denies contractions, loss of fluid, vaginal bleeding. Good fetal movement.  -Taking her iron twice a day, improved to 11.2  -Growth q4, NST at 36 weeks and BPP  -Next growth scan scheduled 3/3     RTC in 2 weeks  Radha Ruiz MD    Pt c/o pain returning to abd right side.  7/10- ERP notified.

## 2025-03-09 ENCOUNTER — APPOINTMENT (OUTPATIENT)
Dept: RADIOLOGY | Facility: IMAGING CENTER | Age: 40
End: 2025-03-09
Attending: FAMILY MEDICINE
Payer: COMMERCIAL

## 2025-03-09 ENCOUNTER — OFFICE VISIT (OUTPATIENT)
Dept: URGENT CARE | Facility: CLINIC | Age: 40
End: 2025-03-09
Payer: COMMERCIAL

## 2025-03-09 VITALS
BODY MASS INDEX: 53.92 KG/M2 | DIASTOLIC BLOOD PRESSURE: 82 MMHG | SYSTOLIC BLOOD PRESSURE: 124 MMHG | TEMPERATURE: 97 F | HEART RATE: 86 BPM | WEIGHT: 293 LBS | HEIGHT: 62 IN | RESPIRATION RATE: 20 BRPM | OXYGEN SATURATION: 96 %
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
## 2025-03-09 DIAGNOSIS — F41.9 ANXIETY: ICD-10-CM

## 2025-03-09 DIAGNOSIS — M25.561 ACUTE PAIN OF RIGHT KNEE: ICD-10-CM

## 2025-03-09 DIAGNOSIS — F33.0 MILD EPISODE OF RECURRENT MAJOR DEPRESSIVE DISORDER (HCC): ICD-10-CM

## 2025-03-09 DIAGNOSIS — F43.12 CHRONIC POST-TRAUMATIC STRESS DISORDER (PTSD): ICD-10-CM

## 2025-03-09 PROCEDURE — 3074F SYST BP LT 130 MM HG: CPT | Performed by: FAMILY MEDICINE

## 2025-03-09 PROCEDURE — 3079F DIAST BP 80-89 MM HG: CPT | Performed by: FAMILY MEDICINE

## 2025-03-09 PROCEDURE — 73564 X-RAY EXAM KNEE 4 OR MORE: CPT | Mod: TC,RT | Performed by: RADIOLOGY

## 2025-03-09 PROCEDURE — 1125F AMNT PAIN NOTED PAIN PRSNT: CPT | Performed by: FAMILY MEDICINE

## 2025-03-09 PROCEDURE — 99214 OFFICE O/P EST MOD 30 MIN: CPT | Performed by: FAMILY MEDICINE

## 2025-03-09 RX ORDER — BUPROPION HYDROCHLORIDE 150 MG/1
150 TABLET ORAL EVERY MORNING
Qty: 30 TABLET | Refills: 0 | Status: SHIPPED | OUTPATIENT
Start: 2025-03-09

## 2025-03-09 ASSESSMENT — ANXIETY QUESTIONNAIRES
4. TROUBLE RELAXING: NEARLY EVERY DAY
7. FEELING AFRAID AS IF SOMETHING AWFUL MIGHT HAPPEN: SEVERAL DAYS
3. WORRYING TOO MUCH ABOUT DIFFERENT THINGS: NEARLY EVERY DAY
1. FEELING NERVOUS, ANXIOUS, OR ON EDGE: NEARLY EVERY DAY
GAD7 TOTAL SCORE: 15
2. NOT BEING ABLE TO STOP OR CONTROL WORRYING: NEARLY EVERY DAY
6. BECOMING EASILY ANNOYED OR IRRITABLE: MORE THAN HALF THE DAYS
5. BEING SO RESTLESS THAT IT IS HARD TO SIT STILL: NOT AT ALL

## 2025-03-09 ASSESSMENT — PATIENT HEALTH QUESTIONNAIRE - PHQ9
SUM OF ALL RESPONSES TO PHQ QUESTIONS 1-9: 13
5. POOR APPETITE OR OVEREATING: 1 - SEVERAL DAYS
CLINICAL INTERPRETATION OF PHQ2 SCORE: 6

## 2025-03-09 ASSESSMENT — FIBROSIS 4 INDEX: FIB4 SCORE: 0.27

## 2025-03-09 ASSESSMENT — PAIN SCALES - GENERAL: PAINLEVEL_OUTOF10: 5=MODERATE PAIN

## 2025-03-09 NOTE — PROGRESS NOTES
"  Subjective:      39 y.o. female presents to urgent care for right knee pain that started last night after a mechanical trip and fall. She was coming down a step when she tripped and landed on her right knee. She did not hit her head or lose consciousness. No subsequent vomiting. She has had constant pain since then, it's described as achy, currently rated 5/10. She has tried tylenol with some relief in symptoms. No prior right knee injury.    She also notes a worsening in depression and anxiety over the last couple of months.  She does have a history of this and PTSD.  She was previously on Wellbutrin, but stopped about 1 year ago she did not know if it was affecting her immunity.  She is following with rheumatology and immunity has improved.  Was stable on Wellbutrin for about 8 years before she came off, she denies any side effects while on it.  She notes just feeling overall more tearful and sad.  Has had 2 panic attacks in the last week.  She is in therapy currently.  She denies any thoughts of self-harm, harm to others, or suicide.    She denies any other questions or concerns at this time.    Current problem list, medication, and past medical/surgical history were reviewed in Epic.    ROS  See HPI     Objective:      /82 (BP Location: Right arm, Patient Position: Sitting, BP Cuff Size: Large adult)   Pulse 86   Temp 36.1 °C (97 °F) (Temporal)   Resp 20   Ht 1.575 m (5' 2\")   Wt (!) 136 kg (299 lb 4.8 oz)   LMP 04/09/2012   SpO2 96%   BMI 54.74 kg/m²     Physical Exam  Constitutional:       General: She is not in acute distress.     Appearance: She is not diaphoretic.   Cardiovascular:      Rate and Rhythm: Normal rate and regular rhythm.      Heart sounds: Normal heart sounds.   Pulmonary:      Effort: Pulmonary effort is normal. No respiratory distress.      Breath sounds: Normal breath sounds.   Musculoskeletal:      Comments: Edema noted to right knee.  Patella is freely mobile.  She is " tender to palpation of her entire knee.  Negative anterior and posterior drawer.  Antalgic gait.   Neurological:      Mental Status: She is alert.   Psychiatric:         Mood and Affect: Affect normal.         Judgment: Judgment normal.       Assessment/Plan:     1. Fall, initial encounter  2. Acute pain of right knee  XRAY showing no acute osseous abnormality, although she does have moderate tricompartmental right knee osteoarthritis.  She was encouraged to use ice, heat, Tylenol, and ibuprofen as needed for symptomatic relief.  - DX-KNEE COMPLETE 4+ RIGHT; Future    3. Mild episode of recurrent major depressive disorder (HCC)  4. Chronic post-traumatic stress disorder (PTSD)  5. Anxiety  Acute on chronic issue.  PHQ and KIAN as below.  We will restart her on her Wellbutrin.  Continue with therapy.  Follow-up with PCP.  - buPROPion (WELLBUTRIN XL) 150 MG XL tablet; Take 1 Tablet by mouth every morning.  Dispense: 30 Tablet; Refill: 0        3/9/2025    11:32 AM   KIAN 7   KIAN-7 Total Score 15         2/22/2024     4:33 PM 2/12/2025     3:20 PM 3/9/2025    11:00 AM   Depression Screen (PHQ-2/PHQ-9)   PHQ-2 Total Score 0     PHQ-2 Total Score  0 6   PHQ-9 Total Score 0     PHQ-9 Total Score   13       Instructed to return to Urgent Care or nearest Emergency Department if symptoms fail to improve, for any change in condition, further concerns, or new concerning symptoms. Patient states understanding of the plan of care and discharge instructions.    Ana Gloria M.D.

## 2025-03-19 ENCOUNTER — APPOINTMENT (OUTPATIENT)
Dept: MEDICAL GROUP | Facility: PHYSICIAN GROUP | Age: 40
End: 2025-03-19
Payer: COMMERCIAL

## 2025-04-02 ENCOUNTER — APPOINTMENT (OUTPATIENT)
Dept: MEDICAL GROUP | Facility: PHYSICIAN GROUP | Age: 40
End: 2025-04-02
Payer: COMMERCIAL

## 2025-04-02 VITALS
HEART RATE: 86 BPM | SYSTOLIC BLOOD PRESSURE: 114 MMHG | DIASTOLIC BLOOD PRESSURE: 72 MMHG | OXYGEN SATURATION: 95 % | TEMPERATURE: 98.1 F | RESPIRATION RATE: 16 BRPM | HEIGHT: 62 IN | BODY MASS INDEX: 53.92 KG/M2 | WEIGHT: 293 LBS

## 2025-04-02 DIAGNOSIS — F43.12 CHRONIC POST-TRAUMATIC STRESS DISORDER (PTSD): ICD-10-CM

## 2025-04-02 DIAGNOSIS — E66.813 CLASS 3 SEVERE OBESITY DUE TO EXCESS CALORIES WITHOUT SERIOUS COMORBIDITY WITH BODY MASS INDEX (BMI) OF 50.0 TO 59.9 IN ADULT: ICD-10-CM

## 2025-04-02 DIAGNOSIS — F33.0 MILD EPISODE OF RECURRENT MAJOR DEPRESSIVE DISORDER (HCC): ICD-10-CM

## 2025-04-02 DIAGNOSIS — I10 PRIMARY HYPERTENSION: ICD-10-CM

## 2025-04-02 DIAGNOSIS — F41.9 ANXIETY: ICD-10-CM

## 2025-04-02 PROCEDURE — 3078F DIAST BP <80 MM HG: CPT | Performed by: PHYSICIAN ASSISTANT

## 2025-04-02 PROCEDURE — 3074F SYST BP LT 130 MM HG: CPT | Performed by: PHYSICIAN ASSISTANT

## 2025-04-02 PROCEDURE — 99214 OFFICE O/P EST MOD 30 MIN: CPT | Performed by: PHYSICIAN ASSISTANT

## 2025-04-02 RX ORDER — LISINOPRIL 10 MG/1
10 TABLET ORAL DAILY
Qty: 90 TABLET | Refills: 3 | Status: SHIPPED | OUTPATIENT
Start: 2025-04-02

## 2025-04-02 RX ORDER — BUPROPION HYDROCHLORIDE 150 MG/1
150 TABLET ORAL EVERY MORNING
Qty: 90 TABLET | Refills: 3 | Status: SHIPPED | OUTPATIENT
Start: 2025-04-02

## 2025-04-02 ASSESSMENT — FIBROSIS 4 INDEX: FIB4 SCORE: 0.27

## 2025-04-02 NOTE — PROGRESS NOTES
"CC:  Chief Complaint   Patient presents with    Medication Refill     buPROPion       HISTORY OF PRESENT ILLNESS: Patient is a 39 y.o. female established patient presenting with issues below  Pt recently restarted on wellbutrin to help her with PTSD. Has been losing weight and coping better. Has been eating a lot less since she is not emotionally eating as much.   HTN well controlled.    Current Outpatient Medications   Medication Sig Dispense Refill    Methotrexate Sodium 50 MG/2ML Solution Inject 1mL under the skin weekly 6 mL 1    buPROPion (WELLBUTRIN XL) 150 MG XL tablet Take 1 Tablet by mouth every morning. 30 Tablet 0    albuterol 108 (90 Base) MCG/ACT Aero Soln inhalation aerosol Inhale 2 Puffs every 6 hours as needed for Shortness of Breath. 8.5 g 0    Insulin Syringe-Needle U-100 (B-D INSULIN SYRINGE 1CC/27G) 27G X 1/2\" 1 ML Misc 1 mL every 7 days. 100 Each 0    sulfaSALAzine (AZULFIDINE EN-TAB) 500 MG EC tablet 500mg (1 tab) twice daily for 1 week then 1000mg (2 tabs) twice daily 120 Tablet 1    hydroxychloroquine (PLAQUENIL) 200 MG Tab Take 2 Tablets by mouth every day. 180 Tablet 1    diclofenac DR (VOLTAREN) 75 MG Tablet Delayed Response Take 1 Tablet by mouth 2 times a day as needed (moderate prn). 180 Tablet 1    lisinopril (PRINIVIL) 10 MG Tab TAKE 1 TABLET BY MOUTH EVERY DAY 90 Tablet 0    Multiple Vitamin (THERAGRAN PO) Take 1 Tablet by mouth every day.      folic acid (FOLVITE) 1 MG Tab Take 2 Tablets by mouth every day. 180 Tablet 3    Acetaminophen 500 MG Pack Take  by mouth.      Multiple Vitamin (MULTI-VITAMIN DAILY PO) Multi Vitamin      [facility] COVID-19 AT HOME TEST KIT Kit  (Patient not taking: Reported on 4/2/2025)       No current facility-administered medications for this visit.        ROS:     ROS    Exam:    /72   Pulse 86   Temp 36.7 °C (98.1 °F) (Temporal)   Resp 16   Ht 1.575 m (5' 2\")   Wt (!) 133 kg (293 lb)   SpO2 95%  Body mass index is 53.59 kg/m².    General:  Well " nourished, well developed female in NAD  Head is grossly normal.  Neck: Supple.   Pulmonary: Clear to auscultation. No ronchi, wheezing or rales  Cardiac: Regular rate and rhythm. No murmurs.  Skin: Warm and dry. No obvious lesions  Neuro: Normal muscle tone. Gait normal. Alert and oriented.  Psych: Normal mood and affect      Please note that this dictation was created using voice recognition software. I have made every reasonable attempt to correct obvious errors, but I expect that there are errors of grammar and possibly content that I did not discover before finalizing the note.        Assessment/Plan:    1. Mild episode of recurrent major depressive disorder (HCC)  Continue wellbutrin 150mg  - buPROPion (WELLBUTRIN XL) 150 MG XL tablet; Take 1 Tablet by mouth every morning.  Dispense: 90 Tablet; Refill: 3    2. Chronic post-traumatic stress disorder (PTSD)    - buPROPion (WELLBUTRIN XL) 150 MG XL tablet; Take 1 Tablet by mouth every morning.  Dispense: 90 Tablet; Refill: 3    3. Anxiety    - buPROPion (WELLBUTRIN XL) 150 MG XL tablet; Take 1 Tablet by mouth every morning.  Dispense: 90 Tablet; Refill: 3    4. Primary hypertension  Well controlled. Continue lisinopril 10mg  - lisinopril (PRINIVIL) 10 MG Tab; Take 1 Tablet by mouth every day.  Dispense: 90 Tablet; Refill: 3    5. Class 3 severe obesity due to excess calories without serious comorbidity with body mass index (BMI) of 50.0 to 59.9 in adult (HCC)  Continue with weight loss efforts

## 2025-04-17 ENCOUNTER — PATIENT MESSAGE (OUTPATIENT)
Dept: MEDICAL GROUP | Facility: PHYSICIAN GROUP | Age: 40
End: 2025-04-17

## 2025-04-17 ENCOUNTER — OFFICE VISIT (OUTPATIENT)
Dept: MEDICAL GROUP | Facility: PHYSICIAN GROUP | Age: 40
End: 2025-04-17
Payer: COMMERCIAL

## 2025-04-17 VITALS
OXYGEN SATURATION: 94 % | HEIGHT: 62 IN | WEIGHT: 293 LBS | HEART RATE: 90 BPM | BODY MASS INDEX: 53.92 KG/M2 | SYSTOLIC BLOOD PRESSURE: 126 MMHG | TEMPERATURE: 97.9 F | RESPIRATION RATE: 16 BRPM | DIASTOLIC BLOOD PRESSURE: 70 MMHG

## 2025-04-17 DIAGNOSIS — E66.813 CLASS 3 SEVERE OBESITY DUE TO EXCESS CALORIES WITHOUT SERIOUS COMORBIDITY WITH BODY MASS INDEX (BMI) OF 50.0 TO 59.9 IN ADULT: ICD-10-CM

## 2025-04-17 DIAGNOSIS — M06.00 RHEUMATOID ARTHRITIS WITH NEGATIVE RHEUMATOID FACTOR, INVOLVING UNSPECIFIED SITE (HCC): ICD-10-CM

## 2025-04-17 PROCEDURE — 3078F DIAST BP <80 MM HG: CPT | Performed by: PHYSICIAN ASSISTANT

## 2025-04-17 PROCEDURE — 3074F SYST BP LT 130 MM HG: CPT | Performed by: PHYSICIAN ASSISTANT

## 2025-04-17 PROCEDURE — 99214 OFFICE O/P EST MOD 30 MIN: CPT | Performed by: PHYSICIAN ASSISTANT

## 2025-04-17 RX ORDER — TIRZEPATIDE 2.5 MG/.5ML
2.5 INJECTION, SOLUTION SUBCUTANEOUS
Qty: 2 ML | Refills: 0 | Status: SHIPPED | OUTPATIENT
Start: 2025-04-17

## 2025-04-17 ASSESSMENT — FIBROSIS 4 INDEX: FIB4 SCORE: 0.27

## 2025-04-17 NOTE — PROGRESS NOTES
"CC:  Chief Complaint   Patient presents with    Other     Weight loss medication       HISTORY OF PRESENT ILLNESS: Patient is a 39 y.o. female established patient presenting with issues below  Pt needing FMLA forms filled out for her RA.   Pt requesting to start on weight loss medication. She feels that it would help her RA as well.     Current Outpatient Medications   Medication Sig Dispense Refill    sulfaSALAzine (AZULFIDINE EN-TAB) 500 MG EC tablet Take 2 Tablets by mouth 2 times a day with meals. 360 Tablet 1    buPROPion (WELLBUTRIN XL) 150 MG XL tablet Take 1 Tablet by mouth every morning. 90 Tablet 3    lisinopril (PRINIVIL) 10 MG Tab Take 1 Tablet by mouth every day. 90 Tablet 3    Methotrexate Sodium 50 MG/2ML Solution Inject 1mL under the skin weekly 6 mL 1    albuterol 108 (90 Base) MCG/ACT Aero Soln inhalation aerosol Inhale 2 Puffs every 6 hours as needed for Shortness of Breath. 8.5 g 0    Insulin Syringe-Needle U-100 (B-D INSULIN SYRINGE 1CC/27G) 27G X 1/2\" 1 ML Misc 1 mL every 7 days. 100 Each 0    hydroxychloroquine (PLAQUENIL) 200 MG Tab Take 2 Tablets by mouth every day. 180 Tablet 1    diclofenac DR (VOLTAREN) 75 MG Tablet Delayed Response Take 1 Tablet by mouth 2 times a day as needed (moderate prn). 180 Tablet 1    Multiple Vitamin (THERAGRAN PO) Take 1 Tablet by mouth every day.      [facility] COVID-19 AT HOME TEST KIT Kit       folic acid (FOLVITE) 1 MG Tab Take 2 Tablets by mouth every day. 180 Tablet 3    Acetaminophen 500 MG Pack Take  by mouth.      Multiple Vitamin (MULTI-VITAMIN DAILY PO) Multi Vitamin       No current facility-administered medications for this visit.        ROS:     ROS    Exam:    /70   Pulse 90   Temp 36.6 °C (97.9 °F) (Temporal)   Resp 16   Ht 1.575 m (5' 2\")   Wt (!) 134 kg (294 lb 9.6 oz)   SpO2 94%  Body mass index is 53.88 kg/m².    General:  Well nourished, well developed female in NAD  Head is grossly normal.  Neck: Supple.   Skin: Warm and dry. No " obvious lesions  Neuro: Normal muscle tone. Gait normal. Alert and oriented.  Psych: Normal mood and affect      Please note that this dictation was created using voice recognition software. I have made every reasonable attempt to correct obvious errors, but I expect that there are errors of grammar and possibly content that I did not discover before finalizing the note.        Assessment/Plan:    1. Class 3 severe obesity due to excess calories without serious comorbidity with body mass index (BMI) of 50.0 to 59.9 in adult  Trial on zepbound. Discussed challenges of insurance approval. She is fine with paying out of pocket for it.   - Tirzepatide-Weight Management (ZEPBOUND) 2.5 MG/0.5ML Solution Auto-injector; Inject 2.5 mg under the skin every 7 days.  Dispense: 2 mL; Refill: 0    2. Rheumatoid arthritis with negative rheumatoid factor, involving unspecified site (HCC)  FMLA forms filled out

## 2025-05-03 DIAGNOSIS — E66.813 CLASS 3 SEVERE OBESITY DUE TO EXCESS CALORIES WITHOUT SERIOUS COMORBIDITY WITH BODY MASS INDEX (BMI) OF 50.0 TO 59.9 IN ADULT: ICD-10-CM

## 2025-05-05 RX ORDER — TIRZEPATIDE 2.5 MG/.5ML
2.5 INJECTION, SOLUTION SUBCUTANEOUS
Qty: 2 ML | Refills: 0 | Status: SHIPPED | OUTPATIENT
Start: 2025-05-05 | End: 2025-05-05

## 2025-05-05 RX ORDER — TIRZEPATIDE 5 MG/.5ML
5 INJECTION, SOLUTION SUBCUTANEOUS
Qty: 2 ML | Refills: 0 | Status: SHIPPED | OUTPATIENT
Start: 2025-05-05 | End: 2025-05-28

## 2025-07-21 ENCOUNTER — OFFICE VISIT (OUTPATIENT)
Dept: MEDICAL GROUP | Facility: PHYSICIAN GROUP | Age: 40
End: 2025-07-21
Payer: COMMERCIAL

## 2025-07-21 VITALS
DIASTOLIC BLOOD PRESSURE: 78 MMHG | TEMPERATURE: 98.3 F | SYSTOLIC BLOOD PRESSURE: 124 MMHG | WEIGHT: 255 LBS | BODY MASS INDEX: 46.93 KG/M2 | RESPIRATION RATE: 12 BRPM | HEART RATE: 85 BPM | OXYGEN SATURATION: 96 % | HEIGHT: 62 IN

## 2025-07-21 DIAGNOSIS — H92.02 LEFT EAR PAIN: Primary | ICD-10-CM

## 2025-07-21 PROCEDURE — 3074F SYST BP LT 130 MM HG: CPT | Performed by: PHYSICIAN ASSISTANT

## 2025-07-21 PROCEDURE — 3078F DIAST BP <80 MM HG: CPT | Performed by: PHYSICIAN ASSISTANT

## 2025-07-21 PROCEDURE — 99213 OFFICE O/P EST LOW 20 MIN: CPT | Performed by: PHYSICIAN ASSISTANT

## 2025-07-21 ASSESSMENT — FIBROSIS 4 INDEX: FIB4 SCORE: 0.22

## 2025-07-21 NOTE — PROGRESS NOTES
"CC:  Chief Complaint   Patient presents with    Otalgia     (L)        HISTORY OF PRESENT ILLNESS: Patient is a 39 y.o. female established patient presenting with issues below  Has been having cough and congestion for the past week. In last 24 hours has been having L ear pain. No fevers. Pain has diminished since taking ibuprofen.     Current Medications[1]     ROS:     ROS    Exam:    /78   Pulse 85   Temp 36.8 °C (98.3 °F) (Temporal)   Resp 12   Ht 1.575 m (5' 2\")   Wt 116 kg (255 lb)   SpO2 96%  Body mass index is 46.64 kg/m².    General:  Well nourished, well developed female in NAD  Head is grossly normal. Bilateral ear canals clear and tympanic membranes normal  Neck: Supple.   Skin: Warm and dry. No obvious lesions  Neuro: Normal muscle tone. Gait normal. Alert and oriented.  Psych: Normal mood and affect      Please note that this dictation was created using voice recognition software. I have made every reasonable attempt to correct obvious errors, but I expect that there are errors of grammar and possibly content that I did not discover before finalizing the note.        Assessment/Plan:    1. Left ear pain (Primary)  Reassurance. No evidence of infection.  Continue with ibuprofen and notify me if not improving or worsening                [1]   Current Outpatient Medications   Medication Sig Dispense Refill    folic acid (FOLVITE) 1 MG Tab Take 3 Tablets by mouth every day. 270 Tablet 3    Methotrexate Sodium 50 MG/2ML Solution Inject 0.8mL under the skin weekly 6 mL 1    hydroxychloroquine (PLAQUENIL) 200 MG Tab Take 2 Tablets by mouth every day. 180 Tablet 1    Tirzepatide-Weight Management 7.5 MG/0.5ML Solution Auto-injector Inject 1 Each under the skin every 7 days. Indications: OBESITY 2 mL 6    sulfaSALAzine (AZULFIDINE EN-TAB) 500 MG EC tablet Take 2 Tablets by mouth 2 times a day with meals. 360 Tablet 1    buPROPion (WELLBUTRIN XL) 150 MG XL tablet Take 1 Tablet by mouth every morning. " "90 Tablet 3    lisinopril (PRINIVIL) 10 MG Tab Take 1 Tablet by mouth every day. 90 Tablet 3    albuterol 108 (90 Base) MCG/ACT Aero Soln inhalation aerosol Inhale 2 Puffs every 6 hours as needed for Shortness of Breath. 8.5 g 0    Insulin Syringe-Needle U-100 (B-D INSULIN SYRINGE 1CC/27G) 27G X 1/2\" 1 ML Misc 1 mL every 7 days. 100 Each 0    diclofenac DR (VOLTAREN) 75 MG Tablet Delayed Response Take 1 Tablet by mouth 2 times a day as needed (moderate prn). 180 Tablet 1    Acetaminophen 500 MG Pack Take  by mouth.      Multiple Vitamin (MULTI-VITAMIN DAILY PO) Multi Vitamin      Multiple Vitamin (THERAGRAN PO) Take 1 Tablet by mouth every day.      [facility] COVID-19 AT HOME TEST KIT Kit  (Patient not taking: Reported on 7/21/2025)       No current facility-administered medications for this visit.     "